# Patient Record
Sex: FEMALE | Race: WHITE | NOT HISPANIC OR LATINO | Employment: PART TIME | URBAN - METROPOLITAN AREA
[De-identification: names, ages, dates, MRNs, and addresses within clinical notes are randomized per-mention and may not be internally consistent; named-entity substitution may affect disease eponyms.]

---

## 2019-01-20 ENCOUNTER — HOSPITAL ENCOUNTER (EMERGENCY)
Facility: HOSPITAL | Age: 59
Discharge: HOME | End: 2019-01-20
Attending: EMERGENCY MEDICINE | Admitting: EMERGENCY MEDICINE

## 2019-01-20 ENCOUNTER — APPOINTMENT (OUTPATIENT)
Dept: CT IMAGING | Facility: HOSPITAL | Age: 59
End: 2019-01-20

## 2019-01-20 VITALS
RESPIRATION RATE: 20 BRPM | HEIGHT: 67 IN | HEART RATE: 80 BPM | DIASTOLIC BLOOD PRESSURE: 70 MMHG | TEMPERATURE: 98.5 F | WEIGHT: 98 LBS | SYSTOLIC BLOOD PRESSURE: 110 MMHG | BODY MASS INDEX: 15.38 KG/M2 | OXYGEN SATURATION: 94 %

## 2019-01-20 DIAGNOSIS — E86.0 DEHYDRATION: ICD-10-CM

## 2019-01-20 DIAGNOSIS — J11.1 INFLUENZA: ICD-10-CM

## 2019-01-20 DIAGNOSIS — R10.9 ABDOMINAL PAIN, UNSPECIFIED ABDOMINAL LOCATION: Primary | ICD-10-CM

## 2019-01-20 LAB
ALBUMIN SERPL-MCNC: 3.71 G/DL (ref 3.2–4.8)
ALBUMIN/GLOB SERPL: 1.8 G/DL (ref 1.5–2.5)
ALP SERPL-CCNC: 110 U/L (ref 25–100)
ALT SERPL W P-5'-P-CCNC: 11 U/L (ref 7–40)
ANION GAP SERPL CALCULATED.3IONS-SCNC: 4 MMOL/L (ref 3–11)
AST SERPL-CCNC: 18 U/L (ref 0–33)
BASOPHILS # BLD AUTO: 0.02 10*3/MM3 (ref 0–0.2)
BASOPHILS NFR BLD AUTO: 0.2 % (ref 0–1)
BILIRUB SERPL-MCNC: 0.2 MG/DL (ref 0.3–1.2)
BILIRUB UR QL STRIP: NEGATIVE
BUN BLD-MCNC: 12 MG/DL (ref 9–23)
BUN/CREAT SERPL: 23.5 (ref 7–25)
CALCIUM SPEC-SCNC: 8.3 MG/DL (ref 8.7–10.4)
CHLORIDE SERPL-SCNC: 111 MMOL/L (ref 99–109)
CLARITY UR: ABNORMAL
CO2 SERPL-SCNC: 27 MMOL/L (ref 20–31)
COLOR UR: ABNORMAL
CREAT BLD-MCNC: 0.51 MG/DL (ref 0.6–1.3)
DEPRECATED RDW RBC AUTO: 48.8 FL (ref 37–54)
EOSINOPHIL # BLD AUTO: 0.13 10*3/MM3 (ref 0–0.3)
EOSINOPHIL NFR BLD AUTO: 1.5 % (ref 0–3)
ERYTHROCYTE [DISTWIDTH] IN BLOOD BY AUTOMATED COUNT: 14.1 % (ref 11.3–14.5)
GFR SERPL CREATININE-BSD FRML MDRD: 124 ML/MIN/1.73
GLOBULIN UR ELPH-MCNC: 2.1 GM/DL
GLUCOSE BLD-MCNC: 95 MG/DL (ref 70–100)
GLUCOSE UR STRIP-MCNC: NEGATIVE MG/DL
HCT VFR BLD AUTO: 40.3 % (ref 34.5–44)
HGB BLD-MCNC: 13.3 G/DL (ref 11.5–15.5)
HGB UR QL STRIP.AUTO: ABNORMAL
IMM GRANULOCYTES # BLD AUTO: 0.01 10*3/MM3 (ref 0–0.03)
IMM GRANULOCYTES NFR BLD AUTO: 0.1 % (ref 0–0.6)
KETONES UR QL STRIP: ABNORMAL
LEUKOCYTE ESTERASE UR QL STRIP.AUTO: ABNORMAL
LIPASE SERPL-CCNC: 23 U/L (ref 6–51)
LYMPHOCYTES # BLD AUTO: 1.77 10*3/MM3 (ref 0.6–4.8)
LYMPHOCYTES NFR BLD AUTO: 20.2 % (ref 24–44)
MCH RBC QN AUTO: 31.2 PG (ref 27–31)
MCHC RBC AUTO-ENTMCNC: 33 G/DL (ref 32–36)
MCV RBC AUTO: 94.6 FL (ref 80–99)
MONOCYTES # BLD AUTO: 0.83 10*3/MM3 (ref 0–1)
MONOCYTES NFR BLD AUTO: 9.5 % (ref 0–12)
NEUTROPHILS # BLD AUTO: 6.02 10*3/MM3 (ref 1.5–8.3)
NEUTROPHILS NFR BLD AUTO: 68.5 % (ref 41–71)
NITRITE UR QL STRIP: NEGATIVE
PH UR STRIP.AUTO: 6 [PH] (ref 5–8)
PLATELET # BLD AUTO: 183 10*3/MM3 (ref 150–450)
PMV BLD AUTO: 10.4 FL (ref 6–12)
POTASSIUM BLD-SCNC: 3.7 MMOL/L (ref 3.5–5.5)
PROT SERPL-MCNC: 5.8 G/DL (ref 5.7–8.2)
PROT UR QL STRIP: ABNORMAL
RBC # BLD AUTO: 4.26 10*6/MM3 (ref 3.89–5.14)
SODIUM BLD-SCNC: 142 MMOL/L (ref 132–146)
SP GR UR STRIP: 1.02 (ref 1–1.03)
UROBILINOGEN UR QL STRIP: ABNORMAL
WBC NRBC COR # BLD: 8.78 10*3/MM3 (ref 3.5–10.8)

## 2019-01-20 PROCEDURE — 80053 COMPREHEN METABOLIC PANEL: CPT | Performed by: EMERGENCY MEDICINE

## 2019-01-20 PROCEDURE — 99284 EMERGENCY DEPT VISIT MOD MDM: CPT

## 2019-01-20 PROCEDURE — 96374 THER/PROPH/DIAG INJ IV PUSH: CPT

## 2019-01-20 PROCEDURE — 25010000002 ONDANSETRON PER 1 MG: Performed by: EMERGENCY MEDICINE

## 2019-01-20 PROCEDURE — 83690 ASSAY OF LIPASE: CPT | Performed by: EMERGENCY MEDICINE

## 2019-01-20 PROCEDURE — 85025 COMPLETE CBC W/AUTO DIFF WBC: CPT | Performed by: EMERGENCY MEDICINE

## 2019-01-20 PROCEDURE — 96375 TX/PRO/DX INJ NEW DRUG ADDON: CPT

## 2019-01-20 PROCEDURE — 96361 HYDRATE IV INFUSION ADD-ON: CPT

## 2019-01-20 PROCEDURE — 87086 URINE CULTURE/COLONY COUNT: CPT | Performed by: EMERGENCY MEDICINE

## 2019-01-20 PROCEDURE — 81001 URINALYSIS AUTO W/SCOPE: CPT | Performed by: EMERGENCY MEDICINE

## 2019-01-20 PROCEDURE — 74176 CT ABD & PELVIS W/O CONTRAST: CPT

## 2019-01-20 PROCEDURE — 25010000002 KETOROLAC TROMETHAMINE PER 15 MG: Performed by: EMERGENCY MEDICINE

## 2019-01-20 PROCEDURE — 93005 ELECTROCARDIOGRAM TRACING: CPT | Performed by: EMERGENCY MEDICINE

## 2019-01-20 RX ORDER — ONDANSETRON 4 MG/1
4 TABLET, ORALLY DISINTEGRATING ORAL EVERY 8 HOURS PRN
Qty: 10 TABLET | Refills: 0 | Status: SHIPPED | OUTPATIENT
Start: 2019-01-20

## 2019-01-20 RX ORDER — KETOROLAC TROMETHAMINE 15 MG/ML
15 INJECTION, SOLUTION INTRAMUSCULAR; INTRAVENOUS ONCE
Status: COMPLETED | OUTPATIENT
Start: 2019-01-20 | End: 2019-01-20

## 2019-01-20 RX ORDER — OSELTAMIVIR PHOSPHATE 75 MG/1
75 CAPSULE ORAL EVERY 12 HOURS
Qty: 10 CAPSULE | Refills: 0 | Status: SHIPPED | OUTPATIENT
Start: 2019-01-20 | End: 2019-01-25

## 2019-01-20 RX ORDER — OXYCODONE AND ACETAMINOPHEN 10; 325 MG/1; MG/1
1 TABLET ORAL EVERY 6 HOURS PRN
COMMUNITY

## 2019-01-20 RX ORDER — PROMETHAZINE HYDROCHLORIDE 25 MG/1
25 TABLET ORAL EVERY 6 HOURS PRN
COMMUNITY
End: 2022-02-07 | Stop reason: HOSPADM

## 2019-01-20 RX ORDER — OSELTAMIVIR PHOSPHATE 75 MG/1
75 CAPSULE ORAL ONCE
Status: COMPLETED | OUTPATIENT
Start: 2019-01-20 | End: 2019-01-20

## 2019-01-20 RX ORDER — ONDANSETRON 2 MG/ML
4 INJECTION INTRAMUSCULAR; INTRAVENOUS ONCE
Status: COMPLETED | OUTPATIENT
Start: 2019-01-20 | End: 2019-01-20

## 2019-01-20 RX ADMIN — OSELTAMIVIR PHOSPHATE 75 MG: 75 CAPSULE ORAL at 22:55

## 2019-01-20 RX ADMIN — ONDANSETRON 4 MG: 2 INJECTION INTRAMUSCULAR; INTRAVENOUS at 20:42

## 2019-01-20 RX ADMIN — SODIUM CHLORIDE 1000 ML: 9 INJECTION, SOLUTION INTRAVENOUS at 20:42

## 2019-01-20 RX ADMIN — KETOROLAC TROMETHAMINE 15 MG: 15 INJECTION, SOLUTION INTRAMUSCULAR; INTRAVENOUS at 21:34

## 2019-01-20 NOTE — ED PROVIDER NOTES
Subjective   Mary West is a 58 y.o.female who presents to the ED with complains of RUQ abdominal pain. The patient reports her pain has been constant since it onset yesterday. She states her pain radiates into her back. She has not taken any medication for her discomfort. She also complains of nausea and a cough, but denies any fever or diarrhea. Additionally, she has a history of pancreatitis and COPD. Moreover, she has a former history of alcohol abuse. There are no other complaints at this time.         History provided by:  Patient  Abdominal Pain   Pain location:  RUQ  Pain quality: aching    Pain radiates to:  Back  Pain severity:  Moderate  Onset quality:  Sudden  Duration:  2 days  Timing:  Constant  Progression:  Unchanged  Chronicity:  New  Relieved by:  None tried  Worsened by:  Nothing  Ineffective treatments:  None tried  Associated symptoms: cough and nausea    Associated symptoms: no diarrhea and no fever    Risk factors: alcohol abuse (former)        Review of Systems   Constitutional: Negative for fever.   Respiratory: Positive for cough.    Gastrointestinal: Positive for abdominal pain and nausea. Negative for diarrhea.   Musculoskeletal: Positive for back pain.   All other systems reviewed and are negative.      No past medical history on file.    Allergies   Allergen Reactions   • Methadone Swelling       No past surgical history on file.    No family history on file.    Social History     Socioeconomic History   • Marital status:      Spouse name: Not on file   • Number of children: Not on file   • Years of education: Not on file   • Highest education level: Not on file         Objective   Physical Exam   Constitutional: She is oriented to person, place, and time. She appears well-developed. No distress.   Muscle wasting.    HENT:   Head: Normocephalic and atraumatic.   Nose: Nose normal.   Eyes: Conjunctivae are normal. No scleral icterus.   Neck: Normal range of motion. Neck supple.    Cardiovascular: Normal rate, regular rhythm, normal heart sounds and intact distal pulses.   No murmur heard.  Pulmonary/Chest: Effort normal. No respiratory distress. She has wheezes.   Occasional wheeze to the upper air fields.    Abdominal: Soft. Bowel sounds are normal. There is tenderness in the right upper quadrant and epigastric area. There is guarding. There is no rebound.   Epigastric and RUQ tenderness with guarding.    Musculoskeletal: Normal range of motion. She exhibits no edema.   Neurological: She is alert and oriented to person, place, and time.   Skin: Skin is warm and dry.   Psychiatric: She has a normal mood and affect. Her behavior is normal.   Nursing note and vitals reviewed.      Procedures         ED Course  ED Course as of Jan 20 2249   Sun Jan 20, 2019 2243 Patient states that she has had cough, congestion, and body aches.  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  Given this and the lack of evidence otherwise, we will treat her for her likely influenza.  I discussed the plan with the patient and her family and they agreed.  [JI]      ED Course User Index  [JI] Dino Zhao PA     Recent Results (from the past 24 hour(s))   Comprehensive Metabolic Panel    Collection Time: 01/20/19  8:25 PM   Result Value Ref Range    Glucose 95 70 - 100 mg/dL    BUN 12 9 - 23 mg/dL    Creatinine 0.51 (L) 0.60 - 1.30 mg/dL    Sodium 142 132 - 146 mmol/L    Potassium 3.7 3.5 - 5.5 mmol/L    Chloride 111 (H) 99 - 109 mmol/L    CO2 27.0 20.0 - 31.0 mmol/L    Calcium 8.3 (L) 8.7 - 10.4 mg/dL    Total Protein 5.8 5.7 - 8.2 g/dL    Albumin 3.71 3.20 - 4.80 g/dL    ALT (SGPT) 11 7 - 40 U/L    AST (SGOT) 18 0 - 33 U/L    Alkaline Phosphatase 110 (H) 25 - 100 U/L    Total Bilirubin 0.2 (L) 0.3 - 1.2 mg/dL    eGFR Non African Amer 124 >60 mL/min/1.73    Globulin 2.1 gm/dL    A/G Ratio 1.8 1.5 - 2.5 g/dL    BUN/Creatinine Ratio 23.5 7.0 - 25.0    Anion Gap 4.0 3.0 - 11.0 mmol/L   Lipase     Collection Time: 01/20/19  8:25 PM   Result Value Ref Range    Lipase 23 6 - 51 U/L   CBC Auto Differential    Collection Time: 01/20/19  8:25 PM   Result Value Ref Range    WBC 8.78 3.50 - 10.80 10*3/mm3    RBC 4.26 3.89 - 5.14 10*6/mm3    Hemoglobin 13.3 11.5 - 15.5 g/dL    Hematocrit 40.3 34.5 - 44.0 %    MCV 94.6 80.0 - 99.0 fL    MCH 31.2 (H) 27.0 - 31.0 pg    MCHC 33.0 32.0 - 36.0 g/dL    RDW 14.1 11.3 - 14.5 %    RDW-SD 48.8 37.0 - 54.0 fl    MPV 10.4 6.0 - 12.0 fL    Platelets 183 150 - 450 10*3/mm3    Neutrophil % 68.5 41.0 - 71.0 %    Lymphocyte % 20.2 (L) 24.0 - 44.0 %    Monocyte % 9.5 0.0 - 12.0 %    Eosinophil % 1.5 0.0 - 3.0 %    Basophil % 0.2 0.0 - 1.0 %    Immature Grans % 0.1 0.0 - 0.6 %    Neutrophils, Absolute 6.02 1.50 - 8.30 10*3/mm3    Lymphocytes, Absolute 1.77 0.60 - 4.80 10*3/mm3    Monocytes, Absolute 0.83 0.00 - 1.00 10*3/mm3    Eosinophils, Absolute 0.13 0.00 - 0.30 10*3/mm3    Basophils, Absolute 0.02 0.00 - 0.20 10*3/mm3    Immature Grans, Absolute 0.01 0.00 - 0.03 10*3/mm3   Urinalysis without microscopic (no culture) - Urine, Clean Catch    Collection Time: 01/20/19  9:44 PM   Result Value Ref Range    Color, UA Dark Yellow (A) Yellow, Straw    Appearance, UA Cloudy (A) Clear    pH, UA 6.0 5.0 - 8.0    Specific Gravity, UA 1.019 1.001 - 1.030    Glucose, UA Negative Negative    Ketones, UA Trace (A) Negative    Bilirubin, UA Negative Negative    Blood, UA Large (3+) (A) Negative    Protein, UA 30 mg/dL (1+) (A) Negative    Leuk Esterase, UA Small (1+) (A) Negative    Nitrite, UA Negative Negative    Urobilinogen, UA 1.0 E.U./dL 0.2 - 1.0 E.U./dL     Note: In addition to lab results from this visit, the labs listed above may include labs taken at another facility or during a different encounter within the last 24 hours. Please correlate lab times with ED admission and discharge times for further clarification of the services performed during this visit.    CT Abdomen Pelvis  Without Contrast   Final Result   No acute abdominal or pelvic abnormality.      THIS DOCUMENT HAS BEEN ELECTRONICALLY SIGNED BY LUIS MOMIN MD        Vitals:    01/20/19 1830 01/20/19 1831 01/20/19 2044 01/20/19 2100   BP: 108/60  99/58 107/65   BP Location:       Patient Position:       Pulse:       Resp:       Temp:       TempSrc:       SpO2: 95% 91% 91% 94%   Weight:       Height:         Medications   oseltamivir (TAMIFLU) capsule 75 mg (not administered)   sodium chloride 0.9 % bolus 1,000 mL (1,000 mL Intravenous New Bag 1/20/19 2042)   ondansetron (ZOFRAN) injection 4 mg (4 mg Intravenous Given 1/20/19 2042)   ketorolac (TORADOL) injection 15 mg (15 mg Intravenous Given 1/20/19 2134)     ECG/EMG Results (last 24 hours)     ** No results found for the last 24 hours. **        ECG 12 Lead   Final Result   Test Reason : abd pain   Blood Pressure : **/** mmHG   Vent. Rate : 066 BPM     Atrial Rate : 066 BPM      P-R Int : 116 ms          QRS Dur : 080 ms       QT Int : 404 ms       P-R-T Axes : 075 079 071 degrees      QTc Int : 423 ms      Normal sinus rhythm   Possible Left atrial enlargement   Borderline ECG   When compared with ECG of 28-APR-2016 19:36,   Nonspecific T wave abnormality now evident in Lateral leads   Confirmed by MAO CRAWFORD MD ([id number]) on 1/20/2019 9:29:30 PM      Referred By:  TY           Confirmed By:MAO CRAWFORD MD                    Recent Results (from the past 24 hour(s))   Comprehensive Metabolic Panel    Collection Time: 01/20/19  8:25 PM   Result Value Ref Range    Glucose 95 70 - 100 mg/dL    BUN 12 9 - 23 mg/dL    Creatinine 0.51 (L) 0.60 - 1.30 mg/dL    Sodium 142 132 - 146 mmol/L    Potassium 3.7 3.5 - 5.5 mmol/L    Chloride 111 (H) 99 - 109 mmol/L    CO2 27.0 20.0 - 31.0 mmol/L    Calcium 8.3 (L) 8.7 - 10.4 mg/dL    Total Protein 5.8 5.7 - 8.2 g/dL    Albumin 3.71 3.20 - 4.80 g/dL    ALT (SGPT) 11 7 - 40 U/L    AST (SGOT) 18 0 - 33 U/L    Alkaline Phosphatase 110 (H) 25  - 100 U/L    Total Bilirubin 0.2 (L) 0.3 - 1.2 mg/dL    eGFR Non African Amer 124 >60 mL/min/1.73    Globulin 2.1 gm/dL    A/G Ratio 1.8 1.5 - 2.5 g/dL    BUN/Creatinine Ratio 23.5 7.0 - 25.0    Anion Gap 4.0 3.0 - 11.0 mmol/L   Lipase    Collection Time: 01/20/19  8:25 PM   Result Value Ref Range    Lipase 23 6 - 51 U/L   CBC Auto Differential    Collection Time: 01/20/19  8:25 PM   Result Value Ref Range    WBC 8.78 3.50 - 10.80 10*3/mm3    RBC 4.26 3.89 - 5.14 10*6/mm3    Hemoglobin 13.3 11.5 - 15.5 g/dL    Hematocrit 40.3 34.5 - 44.0 %    MCV 94.6 80.0 - 99.0 fL    MCH 31.2 (H) 27.0 - 31.0 pg    MCHC 33.0 32.0 - 36.0 g/dL    RDW 14.1 11.3 - 14.5 %    RDW-SD 48.8 37.0 - 54.0 fl    MPV 10.4 6.0 - 12.0 fL    Platelets 183 150 - 450 10*3/mm3    Neutrophil % 68.5 41.0 - 71.0 %    Lymphocyte % 20.2 (L) 24.0 - 44.0 %    Monocyte % 9.5 0.0 - 12.0 %    Eosinophil % 1.5 0.0 - 3.0 %    Basophil % 0.2 0.0 - 1.0 %    Immature Grans % 0.1 0.0 - 0.6 %    Neutrophils, Absolute 6.02 1.50 - 8.30 10*3/mm3    Lymphocytes, Absolute 1.77 0.60 - 4.80 10*3/mm3    Monocytes, Absolute 0.83 0.00 - 1.00 10*3/mm3    Eosinophils, Absolute 0.13 0.00 - 0.30 10*3/mm3    Basophils, Absolute 0.02 0.00 - 0.20 10*3/mm3    Immature Grans, Absolute 0.01 0.00 - 0.03 10*3/mm3   Urinalysis without microscopic (no culture) - Urine, Clean Catch    Collection Time: 01/20/19  9:44 PM   Result Value Ref Range    Color, UA Dark Yellow (A) Yellow, Straw    Appearance, UA Cloudy (A) Clear    pH, UA 6.0 5.0 - 8.0    Specific Gravity, UA 1.019 1.001 - 1.030    Glucose, UA Negative Negative    Ketones, UA Trace (A) Negative    Bilirubin, UA Negative Negative    Blood, UA Large (3+) (A) Negative    Protein, UA 30 mg/dL (1+) (A) Negative    Leuk Esterase, UA Small (1+) (A) Negative    Nitrite, UA Negative Negative    Urobilinogen, UA 1.0 E.U./dL 0.2 - 1.0 E.U./dL     Note: In addition to lab results from this visit, the labs listed above may include labs taken at  another facility or during a different encounter within the last 24 hours. Please correlate lab times with ED admission and discharge times for further clarification of the services performed during this visit.    CT Abdomen Pelvis Without Contrast   Final Result   No acute abdominal or pelvic abnormality.      THIS DOCUMENT HAS BEEN ELECTRONICALLY SIGNED BY LUIS MOMIN MD        Vitals:    01/20/19 1830 01/20/19 1831 01/20/19 2044 01/20/19 2100   BP: 108/60  99/58 107/65   BP Location:       Patient Position:       Pulse:       Resp:       Temp:       TempSrc:       SpO2: 95% 91% 91% 94%   Weight:       Height:         Medications   oseltamivir (TAMIFLU) capsule 75 mg (not administered)   sodium chloride 0.9 % bolus 1,000 mL (1,000 mL Intravenous New Bag 1/20/19 2042)   ondansetron (ZOFRAN) injection 4 mg (4 mg Intravenous Given 1/20/19 2042)   ketorolac (TORADOL) injection 15 mg (15 mg Intravenous Given 1/20/19 2134)     ECG/EMG Results (last 24 hours)     Procedure Component Value Units Date/Time    ECG 12 Lead [[id number]] Collected:  01/20/19 1856     Updated:  01/20/19 2129    Narrative:       Test Reason : abd pain  Blood Pressure : **/** mmHG  Vent. Rate : 066 BPM     Atrial Rate : 066 BPM     P-R Int : 116 ms          QRS Dur : 080 ms      QT Int : 404 ms       P-R-T Axes : 075 079 071 degrees     QTc Int : 423 ms    Normal sinus rhythm  Possible Left atrial enlargement  Borderline ECG  When compared with ECG of 28-APR-2016 19:36,  Nonspecific T wave abnormality now evident in Lateral leads  Confirmed by MAO CRAWFORD MD ([id number]) on 1/20/2019 9:29:30 PM    Referred By:  TY           Confirmed By:MAO CRAWFORD MD        ECG 12 Lead   Final Result   Test Reason : abd pain   Blood Pressure : **/** mmHG   Vent. Rate : 066 BPM     Atrial Rate : 066 BPM      P-R Int : 116 ms          QRS Dur : 080 ms       QT Int : 404 ms       P-R-T Axes : 075 079 071 degrees      QTc Int : 423 ms      Normal sinus rhythm    Possible Left atrial enlargement   Borderline ECG   When compared with ECG of 28-APR-2016 19:36,   Nonspecific T wave abnormality now evident in Lateral leads   Confirmed by MAO CRAWFORD MD ([id number]) on 1/20/2019 9:29:30 PM      Referred By:  TY           Confirmed By:MAO CRAWFORD MD                [facility]    Final diagnoses:   Abdominal pain, unspecified abdominal location   Dehydration   Influenza       Documentation assistance provided by yumiko Blount.  Information recorded by the scribe was done at my direction and has been verified and validated by me.     Fermín Blount  01/20/19 3855       Dino Zhao PA  01/20/19 5222

## 2019-01-21 LAB
BACTERIA UR QL AUTO: ABNORMAL /HPF
COD CRY URNS QL: ABNORMAL /HPF
HYALINE CASTS UR QL AUTO: ABNORMAL /LPF
RBC # UR: ABNORMAL /HPF
REF LAB TEST METHOD: ABNORMAL
SQUAMOUS #/AREA URNS HPF: ABNORMAL /HPF
WBC UR QL AUTO: ABNORMAL /HPF

## 2019-01-23 LAB — BACTERIA SPEC AEROBE CULT: NORMAL

## 2022-01-25 PROCEDURE — U0004 COV-19 TEST NON-CDC HGH THRU: HCPCS | Performed by: PHYSICIAN ASSISTANT

## 2022-01-30 ENCOUNTER — APPOINTMENT (OUTPATIENT)
Dept: CT IMAGING | Facility: HOSPITAL | Age: 62
End: 2022-01-30

## 2022-01-30 ENCOUNTER — APPOINTMENT (OUTPATIENT)
Dept: GENERAL RADIOLOGY | Facility: HOSPITAL | Age: 62
End: 2022-01-30

## 2022-01-30 ENCOUNTER — HOSPITAL ENCOUNTER (INPATIENT)
Facility: HOSPITAL | Age: 62
LOS: 8 days | Discharge: HOME | End: 2022-02-07
Attending: EMERGENCY MEDICINE | Admitting: INTERNAL MEDICINE

## 2022-01-30 ENCOUNTER — APPOINTMENT (OUTPATIENT)
Dept: CARDIOLOGY | Facility: HOSPITAL | Age: 62
End: 2022-01-30

## 2022-01-30 DIAGNOSIS — U07.1 ACUTE RESPIRATORY FAILURE DUE TO COVID-19: Primary | ICD-10-CM

## 2022-01-30 DIAGNOSIS — J96.00 ACUTE RESPIRATORY FAILURE DUE TO COVID-19: Primary | ICD-10-CM

## 2022-01-30 DIAGNOSIS — J18.9 PNEUMONIA OF RIGHT MIDDLE LOBE DUE TO INFECTIOUS ORGANISM: ICD-10-CM

## 2022-01-30 DIAGNOSIS — R13.10 DYSPHAGIA, UNSPECIFIED TYPE: ICD-10-CM

## 2022-01-30 DIAGNOSIS — Z87.09 HISTORY OF COPD: ICD-10-CM

## 2022-01-30 PROBLEM — F17.200 TOBACCO DEPENDENCE: Chronic | Status: ACTIVE | Noted: 2022-01-30

## 2022-01-30 LAB
ALBUMIN SERPL-MCNC: 3.5 G/DL (ref 3.5–5.2)
ALBUMIN/GLOB SERPL: 1.6 G/DL
ALP SERPL-CCNC: 98 U/L (ref 39–117)
ALT SERPL W P-5'-P-CCNC: 36 U/L (ref 1–33)
ANION GAP SERPL CALCULATED.3IONS-SCNC: 10 MMOL/L (ref 5–15)
ARTERIAL PATENCY WRIST A: ABNORMAL
AST SERPL-CCNC: 67 U/L (ref 1–32)
ATMOSPHERIC PRESS: ABNORMAL MM[HG]
BASE EXCESS BLDA CALC-SCNC: 4.9 MMOL/L (ref 0–2)
BASOPHILS # BLD AUTO: 0.01 10*3/MM3 (ref 0–0.2)
BASOPHILS NFR BLD AUTO: 0.2 % (ref 0–1.5)
BDY SITE: ABNORMAL
BH CV ECHO MEAS - AO MAX PG (FULL): 0.54 MMHG
BH CV ECHO MEAS - AO MAX PG: 4 MMHG
BH CV ECHO MEAS - AO MEAN PG (FULL): 0.69 MMHG
BH CV ECHO MEAS - AO MEAN PG: 2.1 MMHG
BH CV ECHO MEAS - AO ROOT AREA (BSA CORRECTED): 2.3
BH CV ECHO MEAS - AO ROOT AREA: 8.9 CM^2
BH CV ECHO MEAS - AO ROOT DIAM: 3.4 CM
BH CV ECHO MEAS - AO V2 MAX: 101.7 CM/SEC
BH CV ECHO MEAS - AO V2 MEAN: 64.3 CM/SEC
BH CV ECHO MEAS - AO V2 VTI: 17.2 CM
BH CV ECHO MEAS - AVA(I,A): 4.3 CM^2
BH CV ECHO MEAS - AVA(I,D): 4.3 CM^2
BH CV ECHO MEAS - AVA(V,A): 4.1 CM^2
BH CV ECHO MEAS - AVA(V,D): 4.1 CM^2
BH CV ECHO MEAS - BSA(HAYCOCK): 1.4 M^2
BH CV ECHO MEAS - BSA: 1.5 M^2
BH CV ECHO MEAS - BZI_BMI: 15.2 KILOGRAMS/M^2
BH CV ECHO MEAS - BZI_METRIC_HEIGHT: 170.2 CM
BH CV ECHO MEAS - BZI_METRIC_WEIGHT: 44 KG
BH CV ECHO MEAS - EDV(CUBED): 39 ML
BH CV ECHO MEAS - EDV(MOD-SP2): 43.8 ML
BH CV ECHO MEAS - EDV(MOD-SP4): 48.5 ML
BH CV ECHO MEAS - EDV(TEICH): 47.2 ML
BH CV ECHO MEAS - EF(CUBED): 76.6 %
BH CV ECHO MEAS - EF(MOD-BP): 50.2 %
BH CV ECHO MEAS - EF(MOD-SP2): 46.1 %
BH CV ECHO MEAS - EF(MOD-SP4): 50.9 %
BH CV ECHO MEAS - EF(TEICH): 69.8 %
BH CV ECHO MEAS - ESV(CUBED): 9.1 ML
BH CV ECHO MEAS - ESV(MOD-SP2): 23.6 ML
BH CV ECHO MEAS - ESV(MOD-SP4): 23.8 ML
BH CV ECHO MEAS - ESV(TEICH): 14.2 ML
BH CV ECHO MEAS - FS: 38.4 %
BH CV ECHO MEAS - IVS/LVPW: 0.99
BH CV ECHO MEAS - IVSD: 0.89 CM
BH CV ECHO MEAS - LA DIMENSION: 2.7 CM
BH CV ECHO MEAS - LA/AO: 0.79
BH CV ECHO MEAS - LAD MAJOR: 3.4 CM
BH CV ECHO MEAS - LV DIASTOLIC VOL/BSA (35-75): 32.6 ML/M^2
BH CV ECHO MEAS - LV MASS(C)D: 84.5 GRAMS
BH CV ECHO MEAS - LV MASS(C)DI: 56.8 GRAMS/M^2
BH CV ECHO MEAS - LV MAX PG: 3.5 MMHG
BH CV ECHO MEAS - LV MEAN PG: 1.4 MMHG
BH CV ECHO MEAS - LV SYSTOLIC VOL/BSA (12-30): 16 ML/M^2
BH CV ECHO MEAS - LV V1 MAX: 93 CM/SEC
BH CV ECHO MEAS - LV V1 MEAN: 51.7 CM/SEC
BH CV ECHO MEAS - LV V1 VTI: 16.8 CM
BH CV ECHO MEAS - LVIDD: 3.4 CM
BH CV ECHO MEAS - LVIDS: 2.1 CM
BH CV ECHO MEAS - LVLD AP2: 6.2 CM
BH CV ECHO MEAS - LVLD AP4: 5.9 CM
BH CV ECHO MEAS - LVLS AP2: 5.3 CM
BH CV ECHO MEAS - LVLS AP4: 5.3 CM
BH CV ECHO MEAS - LVOT AREA (M): 4.5 CM^2
BH CV ECHO MEAS - LVOT AREA: 4.4 CM^2
BH CV ECHO MEAS - LVOT DIAM: 2.4 CM
BH CV ECHO MEAS - LVPWD: 0.91 CM
BH CV ECHO MEAS - MED PEAK E' VEL: 7.4 CM/SEC
BH CV ECHO MEAS - MEDIAL E/E' RATIO: 6.7
BH CV ECHO MEAS - MV A MAX VEL: 51.8 CM/SEC
BH CV ECHO MEAS - MV DEC SLOPE: 126.4 CM/SEC^2
BH CV ECHO MEAS - MV DEC TIME: 0.27 SEC
BH CV ECHO MEAS - MV E MAX VEL: 49.4 CM/SEC
BH CV ECHO MEAS - MV E/A: 0.95
BH CV ECHO MEAS - MV P1/2T MAX VEL: 53.9 CM/SEC
BH CV ECHO MEAS - MV P1/2T: 124.8 MSEC
BH CV ECHO MEAS - MVA P1/2T LCG: 4.1 CM^2
BH CV ECHO MEAS - MVA(P1/2T): 1.8 CM^2
BH CV ECHO MEAS - PA ACC TIME: 0.14 SEC
BH CV ECHO MEAS - PA MAX PG: 2.9 MMHG
BH CV ECHO MEAS - PA PR(ACCEL): 14 MMHG
BH CV ECHO MEAS - PA V2 MAX: 85.1 CM/SEC
BH CV ECHO MEAS - RAP SYSTOLE: 3 MMHG
BH CV ECHO MEAS - RVSP: 29 MMHG
BH CV ECHO MEAS - SI(AO): 103.3 ML/M^2
BH CV ECHO MEAS - SI(CUBED): 20.1 ML/M^2
BH CV ECHO MEAS - SI(LVOT): 50.1 ML/M^2
BH CV ECHO MEAS - SI(MOD-SP2): 13.6 ML/M^2
BH CV ECHO MEAS - SI(MOD-SP4): 16.6 ML/M^2
BH CV ECHO MEAS - SI(TEICH): 22.2 ML/M^2
BH CV ECHO MEAS - SV(AO): 153.6 ML
BH CV ECHO MEAS - SV(CUBED): 29.9 ML
BH CV ECHO MEAS - SV(LVOT): 74.4 ML
BH CV ECHO MEAS - SV(MOD-SP2): 20.2 ML
BH CV ECHO MEAS - SV(MOD-SP4): 24.7 ML
BH CV ECHO MEAS - SV(TEICH): 32.9 ML
BH CV ECHO MEAS - TR MAX PG: 26 MMHG
BH CV ECHO MEAS - TR MAX VEL: 198.2 CM/SEC
BH CV XLRA - RV BASE: 3.2 CM
BH CV XLRA - RV LENGTH: 5.4 CM
BH CV XLRA - RV MID: 2.5 CM
BH CV XLRA - TDI S': 10.1 CM/SEC
BILIRUB SERPL-MCNC: 0.3 MG/DL (ref 0–1.2)
BODY TEMPERATURE: 37 C
BUN SERPL-MCNC: 20 MG/DL (ref 8–23)
BUN/CREAT SERPL: 30.8 (ref 7–25)
CALCIUM SPEC-SCNC: 8.2 MG/DL (ref 8.6–10.5)
CHLORIDE SERPL-SCNC: 102 MMOL/L (ref 98–107)
CK SERPL-CCNC: 104 U/L (ref 20–180)
CO2 BLDA-SCNC: 31.1 MMOL/L (ref 22–33)
CO2 SERPL-SCNC: 26 MMOL/L (ref 22–29)
COHGB MFR BLD: 2.5 % (ref 0–2)
CREAT SERPL-MCNC: 0.58 MG/DL (ref 0.57–1)
CREAT SERPL-MCNC: 0.65 MG/DL (ref 0.57–1)
CRP SERPL-MCNC: 3.23 MG/DL (ref 0–0.5)
D DIMER PPP FEU-MCNC: 2.55 MCGFEU/ML (ref 0–0.56)
D DIMER PPP FEU-MCNC: 2.84 MCGFEU/ML (ref 0–0.56)
D-LACTATE SERPL-SCNC: 1.2 MMOL/L (ref 0.5–2)
D-LACTATE SERPL-SCNC: 2.6 MMOL/L (ref 0.5–2)
DEPRECATED RDW RBC AUTO: 51.1 FL (ref 37–54)
EOSINOPHIL # BLD AUTO: 0 10*3/MM3 (ref 0–0.4)
EOSINOPHIL NFR BLD AUTO: 0 % (ref 0.3–6.2)
EPAP: 0
ERYTHROCYTE [DISTWIDTH] IN BLOOD BY AUTOMATED COUNT: 14.4 % (ref 12.3–15.4)
FERRITIN SERPL-MCNC: 411.2 NG/ML (ref 13–150)
FERRITIN SERPL-MCNC: 527.8 NG/ML (ref 13–150)
GFR SERPL CREATININE-BSD FRML MDRD: 106 ML/MIN/1.73
GFR SERPL CREATININE-BSD FRML MDRD: 93 ML/MIN/1.73
GLOBULIN UR ELPH-MCNC: 2.2 GM/DL
GLUCOSE SERPL-MCNC: 114 MG/DL (ref 65–99)
HCO3 BLDA-SCNC: 29.7 MMOL/L (ref 20–26)
HCT VFR BLD AUTO: 43.1 % (ref 34–46.6)
HCT VFR BLD CALC: 40.7 % (ref 38–51)
HGB BLD-MCNC: 13.9 G/DL (ref 12–15.9)
HGB BLDA-MCNC: 13.3 G/DL (ref 14–18)
HOLD SPECIMEN: NORMAL
IMM GRANULOCYTES # BLD AUTO: 0.02 10*3/MM3 (ref 0–0.05)
IMM GRANULOCYTES NFR BLD AUTO: 0.4 % (ref 0–0.5)
INHALED O2 CONCENTRATION: 96 %
IPAP: 0
LDH SERPL-CCNC: 351 U/L (ref 135–214)
LDH SERPL-CCNC: 372 U/L (ref 135–214)
LIPASE SERPL-CCNC: 197 U/L (ref 13–60)
LV EF 2D ECHO EST: 50 %
LYMPHOCYTES # BLD AUTO: 0.85 10*3/MM3 (ref 0.7–3.1)
LYMPHOCYTES NFR BLD AUTO: 18.1 % (ref 19.6–45.3)
MAGNESIUM SERPL-MCNC: 1.7 MG/DL (ref 1.6–2.4)
MAXIMAL PREDICTED HEART RATE: 159 BPM
MCH RBC QN AUTO: 31.2 PG (ref 26.6–33)
MCHC RBC AUTO-ENTMCNC: 32.3 G/DL (ref 31.5–35.7)
MCV RBC AUTO: 96.6 FL (ref 79–97)
METHGB BLD QL: 0.2 % (ref 0–1.5)
MODALITY: ABNORMAL
MONOCYTES # BLD AUTO: 0.36 10*3/MM3 (ref 0.1–0.9)
MONOCYTES NFR BLD AUTO: 7.7 % (ref 5–12)
NEUTROPHILS NFR BLD AUTO: 3.45 10*3/MM3 (ref 1.7–7)
NEUTROPHILS NFR BLD AUTO: 73.6 % (ref 42.7–76)
NOTE: ABNORMAL
NRBC BLD AUTO-RTO: 0 /100 WBC (ref 0–0.2)
NT-PROBNP SERPL-MCNC: 4973 PG/ML (ref 0–900)
OXYHGB MFR BLDV: 97 % (ref 94–99)
PAW @ PEAK INSP FLOW SETTING VENT: 0 CMH2O
PCO2 BLDA: 43.7 MM HG (ref 35–45)
PCO2 TEMP ADJ BLD: 43.7 MM HG (ref 35–45)
PH BLDA: 7.44 PH UNITS (ref 7.35–7.45)
PH, TEMP CORRECTED: 7.44 PH UNITS
PLATELET # BLD AUTO: 131 10*3/MM3 (ref 140–450)
PMV BLD AUTO: 11.3 FL (ref 6–12)
PO2 BLDA: 150 MM HG (ref 83–108)
PO2 TEMP ADJ BLD: 150 MM HG (ref 83–108)
POTASSIUM SERPL-SCNC: 3.3 MMOL/L (ref 3.5–5.2)
POTASSIUM SERPL-SCNC: 3.9 MMOL/L (ref 3.5–5.2)
PROCALCITONIN SERPL-MCNC: 0.04 NG/ML (ref 0–0.25)
PROT SERPL-MCNC: 5.7 G/DL (ref 6–8.5)
RBC # BLD AUTO: 4.46 10*6/MM3 (ref 3.77–5.28)
SODIUM SERPL-SCNC: 138 MMOL/L (ref 136–145)
STRESS TARGET HR: 135 BPM
TOTAL RATE: 0 BREATHS/MINUTE
TROPONIN T SERPL-MCNC: 0.02 NG/ML (ref 0–0.03)
WBC NRBC COR # BLD: 4.69 10*3/MM3 (ref 3.4–10.8)
WHOLE BLOOD HOLD SPECIMEN: NORMAL
WHOLE BLOOD HOLD SPECIMEN: NORMAL

## 2022-01-30 PROCEDURE — 83615 LACTATE (LD) (LDH) ENZYME: CPT | Performed by: NURSE PRACTITIONER

## 2022-01-30 PROCEDURE — 80053 COMPREHEN METABOLIC PANEL: CPT | Performed by: EMERGENCY MEDICINE

## 2022-01-30 PROCEDURE — 71045 X-RAY EXAM CHEST 1 VIEW: CPT

## 2022-01-30 PROCEDURE — 83690 ASSAY OF LIPASE: CPT | Performed by: NURSE PRACTITIONER

## 2022-01-30 PROCEDURE — 83050 HGB METHEMOGLOBIN QUAN: CPT

## 2022-01-30 PROCEDURE — 71275 CT ANGIOGRAPHY CHEST: CPT

## 2022-01-30 PROCEDURE — 94640 AIRWAY INHALATION TREATMENT: CPT

## 2022-01-30 PROCEDURE — 93306 TTE W/DOPPLER COMPLETE: CPT

## 2022-01-30 PROCEDURE — 82805 BLOOD GASES W/O2 SATURATION: CPT

## 2022-01-30 PROCEDURE — 25010000002 AZITHROMYCIN PER 500 MG: Performed by: NURSE PRACTITIONER

## 2022-01-30 PROCEDURE — 99232 SBSQ HOSP IP/OBS MODERATE 35: CPT | Performed by: INTERNAL MEDICINE

## 2022-01-30 PROCEDURE — 83615 LACTATE (LD) (LDH) ENZYME: CPT | Performed by: EMERGENCY MEDICINE

## 2022-01-30 PROCEDURE — 25010000002 REMDESIVIR 100 MG/20ML SOLUTION 1 EACH VIAL: Performed by: NURSE PRACTITIONER

## 2022-01-30 PROCEDURE — 84145 PROCALCITONIN (PCT): CPT | Performed by: EMERGENCY MEDICINE

## 2022-01-30 PROCEDURE — 99284 EMERGENCY DEPT VISIT MOD MDM: CPT

## 2022-01-30 PROCEDURE — 82375 ASSAY CARBOXYHB QUANT: CPT

## 2022-01-30 PROCEDURE — 85379 FIBRIN DEGRADATION QUANT: CPT | Performed by: NURSE PRACTITIONER

## 2022-01-30 PROCEDURE — XW033E5 INTRODUCTION OF REMDESIVIR ANTI-INFECTIVE INTO PERIPHERAL VEIN, PERCUTANEOUS APPROACH, NEW TECHNOLOGY GROUP 5: ICD-10-PCS | Performed by: INTERNAL MEDICINE

## 2022-01-30 PROCEDURE — 86140 C-REACTIVE PROTEIN: CPT | Performed by: EMERGENCY MEDICINE

## 2022-01-30 PROCEDURE — 25010000002 CEFTRIAXONE PER 250 MG: Performed by: NURSE PRACTITIONER

## 2022-01-30 PROCEDURE — 99223 1ST HOSP IP/OBS HIGH 75: CPT | Performed by: INTERNAL MEDICINE

## 2022-01-30 PROCEDURE — 85025 COMPLETE CBC W/AUTO DIFF WBC: CPT | Performed by: EMERGENCY MEDICINE

## 2022-01-30 PROCEDURE — 84132 ASSAY OF SERUM POTASSIUM: CPT | Performed by: NURSE PRACTITIONER

## 2022-01-30 PROCEDURE — 25010000002 DEXAMETHASONE SODIUM PHOSPHATE 100 MG/10ML SOLUTION: Performed by: NURSE PRACTITIONER

## 2022-01-30 PROCEDURE — 82565 ASSAY OF CREATININE: CPT | Performed by: NURSE PRACTITIONER

## 2022-01-30 PROCEDURE — 87040 BLOOD CULTURE FOR BACTERIA: CPT | Performed by: NURSE PRACTITIONER

## 2022-01-30 PROCEDURE — 83605 ASSAY OF LACTIC ACID: CPT | Performed by: NURSE PRACTITIONER

## 2022-01-30 PROCEDURE — 51702 INSERT TEMP BLADDER CATH: CPT

## 2022-01-30 PROCEDURE — 83880 ASSAY OF NATRIURETIC PEPTIDE: CPT | Performed by: EMERGENCY MEDICINE

## 2022-01-30 PROCEDURE — 36600 WITHDRAWAL OF ARTERIAL BLOOD: CPT

## 2022-01-30 PROCEDURE — 84484 ASSAY OF TROPONIN QUANT: CPT | Performed by: EMERGENCY MEDICINE

## 2022-01-30 PROCEDURE — 36415 COLL VENOUS BLD VENIPUNCTURE: CPT

## 2022-01-30 PROCEDURE — 83735 ASSAY OF MAGNESIUM: CPT | Performed by: NURSE PRACTITIONER

## 2022-01-30 PROCEDURE — 82728 ASSAY OF FERRITIN: CPT | Performed by: EMERGENCY MEDICINE

## 2022-01-30 PROCEDURE — 93005 ELECTROCARDIOGRAM TRACING: CPT | Performed by: EMERGENCY MEDICINE

## 2022-01-30 PROCEDURE — 25010000002 ENOXAPARIN PER 10 MG: Performed by: NURSE PRACTITIONER

## 2022-01-30 PROCEDURE — 82550 ASSAY OF CK (CPK): CPT | Performed by: NURSE PRACTITIONER

## 2022-01-30 PROCEDURE — 0 MAGNESIUM SULFATE 4 GM/100ML SOLUTION: Performed by: NURSE PRACTITIONER

## 2022-01-30 PROCEDURE — 82728 ASSAY OF FERRITIN: CPT | Performed by: NURSE PRACTITIONER

## 2022-01-30 PROCEDURE — 0 IOPAMIDOL PER 1 ML: Performed by: EMERGENCY MEDICINE

## 2022-01-30 PROCEDURE — 25010000002 FUROSEMIDE PER 20 MG: Performed by: NURSE PRACTITIONER

## 2022-01-30 RX ORDER — POLYETHYLENE GLYCOL 3350 17 G/17G
17 POWDER, FOR SOLUTION ORAL DAILY PRN
Status: DISCONTINUED | OUTPATIENT
Start: 2022-01-30 | End: 2022-02-07 | Stop reason: HOSPADM

## 2022-01-30 RX ORDER — MULTIPLE VITAMINS W/ MINERALS TAB 9MG-400MCG
1 TAB ORAL DAILY
Status: DISCONTINUED | OUTPATIENT
Start: 2022-01-30 | End: 2022-02-07 | Stop reason: HOSPADM

## 2022-01-30 RX ORDER — ONDANSETRON 2 MG/ML
4 INJECTION INTRAMUSCULAR; INTRAVENOUS EVERY 6 HOURS PRN
Status: DISCONTINUED | OUTPATIENT
Start: 2022-01-30 | End: 2022-02-07 | Stop reason: HOSPADM

## 2022-01-30 RX ORDER — OXYCODONE HYDROCHLORIDE AND ACETAMINOPHEN 5; 325 MG/1; MG/1
1 TABLET ORAL EVERY 6 HOURS PRN
Status: DISCONTINUED | OUTPATIENT
Start: 2022-01-30 | End: 2022-02-07 | Stop reason: HOSPADM

## 2022-01-30 RX ORDER — BENZONATATE 100 MG/1
100 CAPSULE ORAL 3 TIMES DAILY PRN
Status: DISCONTINUED | OUTPATIENT
Start: 2022-01-30 | End: 2022-02-07 | Stop reason: HOSPADM

## 2022-01-30 RX ORDER — NOREPINEPHRINE BIT/0.9 % NACL 8 MG/250ML
.02-.3 INFUSION BOTTLE (ML) INTRAVENOUS
Status: DISCONTINUED | OUTPATIENT
Start: 2022-01-30 | End: 2022-02-02

## 2022-01-30 RX ORDER — BISACODYL 10 MG
10 SUPPOSITORY, RECTAL RECTAL DAILY PRN
Status: DISCONTINUED | OUTPATIENT
Start: 2022-01-30 | End: 2022-02-07 | Stop reason: HOSPADM

## 2022-01-30 RX ORDER — CALCIUM CARBONATE 200(500)MG
2 TABLET,CHEWABLE ORAL 2 TIMES DAILY PRN
Status: DISCONTINUED | OUTPATIENT
Start: 2022-01-30 | End: 2022-02-07 | Stop reason: HOSPADM

## 2022-01-30 RX ORDER — FUROSEMIDE 10 MG/ML
60 INJECTION INTRAMUSCULAR; INTRAVENOUS ONCE
Status: COMPLETED | OUTPATIENT
Start: 2022-01-30 | End: 2022-01-30

## 2022-01-30 RX ORDER — AMOXICILLIN 250 MG
2 CAPSULE ORAL 2 TIMES DAILY
Status: DISCONTINUED | OUTPATIENT
Start: 2022-01-30 | End: 2022-02-07 | Stop reason: HOSPADM

## 2022-01-30 RX ORDER — POTASSIUM CHLORIDE 750 MG/1
40 CAPSULE, EXTENDED RELEASE ORAL AS NEEDED
Status: DISCONTINUED | OUTPATIENT
Start: 2022-01-30 | End: 2022-02-07 | Stop reason: HOSPADM

## 2022-01-30 RX ORDER — SODIUM CHLORIDE 0.9 % (FLUSH) 0.9 %
10 SYRINGE (ML) INJECTION EVERY 12 HOURS SCHEDULED
Status: DISCONTINUED | OUTPATIENT
Start: 2022-01-30 | End: 2022-01-31

## 2022-01-30 RX ORDER — MAGNESIUM SULFATE HEPTAHYDRATE 40 MG/ML
4 INJECTION, SOLUTION INTRAVENOUS AS NEEDED
Status: DISCONTINUED | OUTPATIENT
Start: 2022-01-30 | End: 2022-02-07 | Stop reason: HOSPADM

## 2022-01-30 RX ORDER — HEPARIN SODIUM 5000 [USP'U]/ML
5000 INJECTION, SOLUTION INTRAVENOUS; SUBCUTANEOUS EVERY 8 HOURS SCHEDULED
Status: DISCONTINUED | OUTPATIENT
Start: 2022-01-30 | End: 2022-01-30

## 2022-01-30 RX ORDER — DEXAMETHASONE SODIUM PHOSPHATE 4 MG/ML
6 INJECTION, SOLUTION INTRA-ARTICULAR; INTRALESIONAL; INTRAMUSCULAR; INTRAVENOUS; SOFT TISSUE DAILY
Status: DISCONTINUED | OUTPATIENT
Start: 2022-01-31 | End: 2022-02-07 | Stop reason: HOSPADM

## 2022-01-30 RX ORDER — ALBUTEROL SULFATE 90 UG/1
2 AEROSOL, METERED RESPIRATORY (INHALATION) EVERY 6 HOURS PRN
Status: DISCONTINUED | OUTPATIENT
Start: 2022-01-30 | End: 2022-01-31

## 2022-01-30 RX ORDER — OXYCODONE AND ACETAMINOPHEN 10; 325 MG/1; MG/1
1 TABLET ORAL EVERY 6 HOURS PRN
Status: DISCONTINUED | OUTPATIENT
Start: 2022-01-30 | End: 2022-01-30

## 2022-01-30 RX ORDER — MAGNESIUM SULFATE HEPTAHYDRATE 40 MG/ML
2 INJECTION, SOLUTION INTRAVENOUS AS NEEDED
Status: DISCONTINUED | OUTPATIENT
Start: 2022-01-30 | End: 2022-02-07 | Stop reason: HOSPADM

## 2022-01-30 RX ORDER — SODIUM CHLORIDE 0.9 % (FLUSH) 0.9 %
10 SYRINGE (ML) INJECTION AS NEEDED
Status: DISCONTINUED | OUTPATIENT
Start: 2022-01-30 | End: 2022-01-31

## 2022-01-30 RX ORDER — ACETAMINOPHEN 160 MG/5ML
650 SOLUTION ORAL EVERY 4 HOURS PRN
Status: DISCONTINUED | OUTPATIENT
Start: 2022-01-30 | End: 2022-02-07 | Stop reason: HOSPADM

## 2022-01-30 RX ORDER — ONDANSETRON 4 MG/1
4 TABLET, FILM COATED ORAL EVERY 6 HOURS PRN
Status: DISCONTINUED | OUTPATIENT
Start: 2022-01-30 | End: 2022-02-07 | Stop reason: HOSPADM

## 2022-01-30 RX ORDER — DEXTROMETHORPHAN POLISTIREX 30 MG/5ML
60 SUSPENSION ORAL EVERY 12 HOURS PRN
Status: DISCONTINUED | OUTPATIENT
Start: 2022-01-30 | End: 2022-02-07 | Stop reason: HOSPADM

## 2022-01-30 RX ORDER — NICOTINE 21 MG/24HR
1 PATCH, TRANSDERMAL 24 HOURS TRANSDERMAL
Status: DISCONTINUED | OUTPATIENT
Start: 2022-01-30 | End: 2022-01-31

## 2022-01-30 RX ORDER — NOREPINEPHRINE BIT/0.9 % NACL 8 MG/250ML
.02-.3 INFUSION BOTTLE (ML) INTRAVENOUS
Status: DISCONTINUED | OUTPATIENT
Start: 2022-01-30 | End: 2022-01-30

## 2022-01-30 RX ORDER — BISACODYL 5 MG/1
5 TABLET, DELAYED RELEASE ORAL DAILY PRN
Status: DISCONTINUED | OUTPATIENT
Start: 2022-01-30 | End: 2022-02-07 | Stop reason: HOSPADM

## 2022-01-30 RX ORDER — ACETAMINOPHEN 650 MG/1
650 SUPPOSITORY RECTAL EVERY 4 HOURS PRN
Status: DISCONTINUED | OUTPATIENT
Start: 2022-01-30 | End: 2022-02-07 | Stop reason: HOSPADM

## 2022-01-30 RX ORDER — ACETAMINOPHEN 325 MG/1
650 TABLET ORAL EVERY 4 HOURS PRN
Status: DISCONTINUED | OUTPATIENT
Start: 2022-01-30 | End: 2022-02-07 | Stop reason: HOSPADM

## 2022-01-30 RX ORDER — POTASSIUM CHLORIDE 1.5 G/1.77G
40 POWDER, FOR SOLUTION ORAL AS NEEDED
Status: DISCONTINUED | OUTPATIENT
Start: 2022-01-30 | End: 2022-02-07 | Stop reason: HOSPADM

## 2022-01-30 RX ORDER — POTASSIUM CHLORIDE 7.45 MG/ML
10 INJECTION INTRAVENOUS
Status: DISCONTINUED | OUTPATIENT
Start: 2022-01-30 | End: 2022-02-07 | Stop reason: HOSPADM

## 2022-01-30 RX ORDER — BUDESONIDE AND FORMOTEROL FUMARATE DIHYDRATE 160; 4.5 UG/1; UG/1
2 AEROSOL RESPIRATORY (INHALATION)
Status: DISCONTINUED | OUTPATIENT
Start: 2022-01-30 | End: 2022-01-31

## 2022-01-30 RX ADMIN — SODIUM CHLORIDE 1 G: 900 INJECTION INTRAVENOUS at 11:52

## 2022-01-30 RX ADMIN — Medication 0.02 MCG/KG/MIN: at 21:34

## 2022-01-30 RX ADMIN — ACETAMINOPHEN 650 MG: 325 TABLET, FILM COATED ORAL at 20:44

## 2022-01-30 RX ADMIN — Medication 1 PATCH: at 17:17

## 2022-01-30 RX ADMIN — SODIUM CHLORIDE, PRESERVATIVE FREE 10 ML: 5 INJECTION INTRAVENOUS at 20:12

## 2022-01-30 RX ADMIN — MAGNESIUM SULFATE HEPTAHYDRATE 4 G: 40 INJECTION, SOLUTION INTRAVENOUS at 20:09

## 2022-01-30 RX ADMIN — Medication 1 TABLET: at 17:18

## 2022-01-30 RX ADMIN — SODIUM CHLORIDE 1000 ML: 9 INJECTION, SOLUTION INTRAVENOUS at 08:42

## 2022-01-30 RX ADMIN — DEXAMETHASONE SODIUM PHOSPHATE 10 MG: 10 INJECTION, SOLUTION INTRAMUSCULAR; INTRAVENOUS at 08:44

## 2022-01-30 RX ADMIN — POTASSIUM CHLORIDE 40 MEQ: 1.5 FOR SOLUTION ORAL at 23:26

## 2022-01-30 RX ADMIN — ENOXAPARIN SODIUM 40 MG: 40 INJECTION SUBCUTANEOUS at 20:12

## 2022-01-30 RX ADMIN — AZITHROMYCIN 500 MG: 500 INJECTION, POWDER, LYOPHILIZED, FOR SOLUTION INTRAVENOUS at 12:24

## 2022-01-30 RX ADMIN — REMDESIVIR 200 MG: 100 INJECTION, POWDER, LYOPHILIZED, FOR SOLUTION INTRAVENOUS at 17:18

## 2022-01-30 RX ADMIN — FUROSEMIDE 60 MG: 10 INJECTION INTRAMUSCULAR; INTRAVENOUS at 14:27

## 2022-01-30 RX ADMIN — BUDESONIDE AND FORMOTEROL FUMARATE DIHYDRATE 2 PUFF: 160; 4.5 AEROSOL RESPIRATORY (INHALATION) at 20:41

## 2022-01-30 RX ADMIN — IOPAMIDOL 75 ML: 755 INJECTION, SOLUTION INTRAVENOUS at 10:16

## 2022-01-31 ENCOUNTER — APPOINTMENT (OUTPATIENT)
Dept: GENERAL RADIOLOGY | Facility: HOSPITAL | Age: 62
End: 2022-01-31

## 2022-01-31 LAB
ALBUMIN SERPL-MCNC: 3.1 G/DL (ref 3.5–5.2)
ALBUMIN/GLOB SERPL: 1.4 G/DL
ALP SERPL-CCNC: 94 U/L (ref 39–117)
ALT SERPL W P-5'-P-CCNC: 31 U/L (ref 1–33)
AMYLASE SERPL-CCNC: 180 U/L (ref 28–100)
ANION GAP SERPL CALCULATED.3IONS-SCNC: 9 MMOL/L (ref 5–15)
AST SERPL-CCNC: 60 U/L (ref 1–32)
BASOPHILS # BLD AUTO: 0.01 10*3/MM3 (ref 0–0.2)
BASOPHILS NFR BLD AUTO: 0.1 % (ref 0–1.5)
BILIRUB CONJ SERPL-MCNC: <0.2 MG/DL (ref 0–0.3)
BILIRUB SERPL-MCNC: 0.3 MG/DL (ref 0–1.2)
BUN SERPL-MCNC: 17 MG/DL (ref 8–23)
BUN/CREAT SERPL: 37.8 (ref 7–25)
CALCIUM SPEC-SCNC: 7.4 MG/DL (ref 8.6–10.5)
CHLORIDE SERPL-SCNC: 98 MMOL/L (ref 98–107)
CO2 SERPL-SCNC: 29 MMOL/L (ref 22–29)
CREAT SERPL-MCNC: 0.45 MG/DL (ref 0.57–1)
CRP SERPL-MCNC: 6.28 MG/DL (ref 0–0.5)
DEPRECATED RDW RBC AUTO: 49.9 FL (ref 37–54)
EOSINOPHIL # BLD AUTO: 0.18 10*3/MM3 (ref 0–0.4)
EOSINOPHIL NFR BLD AUTO: 2.1 % (ref 0.3–6.2)
ERYTHROCYTE [DISTWIDTH] IN BLOOD BY AUTOMATED COUNT: 14.5 % (ref 12.3–15.4)
FERRITIN SERPL-MCNC: 533.7 NG/ML (ref 13–150)
GFR SERPL CREATININE-BSD FRML MDRD: 142 ML/MIN/1.73
GLOBULIN UR ELPH-MCNC: 2.2 GM/DL
GLUCOSE SERPL-MCNC: 106 MG/DL (ref 65–99)
HCT VFR BLD AUTO: 46 % (ref 34–46.6)
HGB BLD-MCNC: 15.2 G/DL (ref 12–15.9)
IMM GRANULOCYTES # BLD AUTO: 0.06 10*3/MM3 (ref 0–0.05)
IMM GRANULOCYTES NFR BLD AUTO: 0.7 % (ref 0–0.5)
LIPASE SERPL-CCNC: 79 U/L (ref 13–60)
LYMPHOCYTES # BLD AUTO: 2.02 10*3/MM3 (ref 0.7–3.1)
LYMPHOCYTES NFR BLD AUTO: 24 % (ref 19.6–45.3)
MAGNESIUM SERPL-MCNC: 3.1 MG/DL (ref 1.6–2.4)
MCH RBC QN AUTO: 31 PG (ref 26.6–33)
MCHC RBC AUTO-ENTMCNC: 33 G/DL (ref 31.5–35.7)
MCV RBC AUTO: 93.9 FL (ref 79–97)
MONOCYTES # BLD AUTO: 0.81 10*3/MM3 (ref 0.1–0.9)
MONOCYTES NFR BLD AUTO: 9.6 % (ref 5–12)
NEUTROPHILS NFR BLD AUTO: 5.33 10*3/MM3 (ref 1.7–7)
NEUTROPHILS NFR BLD AUTO: 63.5 % (ref 42.7–76)
NRBC BLD AUTO-RTO: 0 /100 WBC (ref 0–0.2)
PLATELET # BLD AUTO: 174 10*3/MM3 (ref 140–450)
PMV BLD AUTO: 11.9 FL (ref 6–12)
POTASSIUM SERPL-SCNC: 4.4 MMOL/L (ref 3.5–5.2)
PROT SERPL-MCNC: 5.3 G/DL (ref 6–8.5)
QT INTERVAL: 322 MS
QT INTERVAL: 450 MS
QTC INTERVAL: 378 MS
QTC INTERVAL: 450 MS
RBC # BLD AUTO: 4.9 10*6/MM3 (ref 3.77–5.28)
SODIUM SERPL-SCNC: 136 MMOL/L (ref 136–145)
WBC NRBC COR # BLD: 8.41 10*3/MM3 (ref 3.4–10.8)

## 2022-01-31 PROCEDURE — 02HV33Z INSERTION OF INFUSION DEVICE INTO SUPERIOR VENA CAVA, PERCUTANEOUS APPROACH: ICD-10-PCS | Performed by: INTERNAL MEDICINE

## 2022-01-31 PROCEDURE — 94799 UNLISTED PULMONARY SVC/PX: CPT

## 2022-01-31 PROCEDURE — 85025 COMPLETE CBC W/AUTO DIFF WBC: CPT | Performed by: NURSE PRACTITIONER

## 2022-01-31 PROCEDURE — 80053 COMPREHEN METABOLIC PANEL: CPT | Performed by: NURSE PRACTITIONER

## 2022-01-31 PROCEDURE — 82150 ASSAY OF AMYLASE: CPT | Performed by: NURSE PRACTITIONER

## 2022-01-31 PROCEDURE — 93005 ELECTROCARDIOGRAM TRACING: CPT | Performed by: INTERNAL MEDICINE

## 2022-01-31 PROCEDURE — 25010000002 AZITHROMYCIN PER 500 MG: Performed by: INTERNAL MEDICINE

## 2022-01-31 PROCEDURE — 83735 ASSAY OF MAGNESIUM: CPT | Performed by: NURSE PRACTITIONER

## 2022-01-31 PROCEDURE — C1894 INTRO/SHEATH, NON-LASER: HCPCS

## 2022-01-31 PROCEDURE — 93010 ELECTROCARDIOGRAM REPORT: CPT | Performed by: INTERNAL MEDICINE

## 2022-01-31 PROCEDURE — 86140 C-REACTIVE PROTEIN: CPT | Performed by: NURSE PRACTITIONER

## 2022-01-31 PROCEDURE — 25010000002 ENOXAPARIN PER 10 MG: Performed by: NURSE PRACTITIONER

## 2022-01-31 PROCEDURE — 25010000002 REMDESIVIR 100 MG/20ML SOLUTION 1 EACH VIAL: Performed by: NURSE PRACTITIONER

## 2022-01-31 PROCEDURE — 71045 X-RAY EXAM CHEST 1 VIEW: CPT

## 2022-01-31 PROCEDURE — 92610 EVALUATE SWALLOWING FUNCTION: CPT

## 2022-01-31 PROCEDURE — 25010000002 CEFTRIAXONE PER 250 MG: Performed by: NURSE PRACTITIONER

## 2022-01-31 PROCEDURE — 82728 ASSAY OF FERRITIN: CPT | Performed by: NURSE PRACTITIONER

## 2022-01-31 PROCEDURE — 63710000001 DEXAMETHASONE PER 0.25 MG: Performed by: NURSE PRACTITIONER

## 2022-01-31 PROCEDURE — 83690 ASSAY OF LIPASE: CPT | Performed by: NURSE PRACTITIONER

## 2022-01-31 PROCEDURE — 99233 SBSQ HOSP IP/OBS HIGH 50: CPT | Performed by: INTERNAL MEDICINE

## 2022-01-31 PROCEDURE — 82248 BILIRUBIN DIRECT: CPT | Performed by: NURSE PRACTITIONER

## 2022-01-31 PROCEDURE — C1751 CATH, INF, PER/CENT/MIDLINE: HCPCS

## 2022-01-31 RX ORDER — ALBUTEROL SULFATE 90 UG/1
2 AEROSOL, METERED RESPIRATORY (INHALATION)
Status: DISCONTINUED | OUTPATIENT
Start: 2022-01-31 | End: 2022-02-07 | Stop reason: HOSPADM

## 2022-01-31 RX ORDER — PANTOPRAZOLE SODIUM 40 MG/1
40 TABLET, DELAYED RELEASE ORAL
Status: DISCONTINUED | OUTPATIENT
Start: 2022-02-01 | End: 2022-02-07 | Stop reason: HOSPADM

## 2022-01-31 RX ORDER — SODIUM CHLORIDE 0.9 % (FLUSH) 0.9 %
10 SYRINGE (ML) INJECTION AS NEEDED
Status: DISCONTINUED | OUTPATIENT
Start: 2022-01-31 | End: 2022-02-07 | Stop reason: HOSPADM

## 2022-01-31 RX ORDER — COSYNTROPIN 0.25 MG/ML
0.25 INJECTION, POWDER, FOR SOLUTION INTRAMUSCULAR; INTRAVENOUS ONCE
Status: COMPLETED | OUTPATIENT
Start: 2022-02-01 | End: 2022-02-01

## 2022-01-31 RX ORDER — GUAIFENESIN 600 MG/1
1200 TABLET, EXTENDED RELEASE ORAL EVERY 12 HOURS SCHEDULED
Status: DISCONTINUED | OUTPATIENT
Start: 2022-01-31 | End: 2022-02-07 | Stop reason: HOSPADM

## 2022-01-31 RX ORDER — ALBUTEROL SULFATE 90 UG/1
2 AEROSOL, METERED RESPIRATORY (INHALATION) EVERY 4 HOURS PRN
Status: DISCONTINUED | OUTPATIENT
Start: 2022-01-31 | End: 2022-02-07 | Stop reason: HOSPADM

## 2022-01-31 RX ORDER — SODIUM CHLORIDE 0.9 % (FLUSH) 0.9 %
10 SYRINGE (ML) INJECTION EVERY 12 HOURS SCHEDULED
Status: DISCONTINUED | OUTPATIENT
Start: 2022-01-31 | End: 2022-02-07 | Stop reason: HOSPADM

## 2022-01-31 RX ORDER — SODIUM CHLORIDE 0.9 % (FLUSH) 0.9 %
20 SYRINGE (ML) INJECTION AS NEEDED
Status: DISCONTINUED | OUTPATIENT
Start: 2022-01-31 | End: 2022-02-07 | Stop reason: HOSPADM

## 2022-01-31 RX ORDER — NICOTINE 21 MG/24HR
1 PATCH, TRANSDERMAL 24 HOURS TRANSDERMAL
Status: DISCONTINUED | OUTPATIENT
Start: 2022-02-01 | End: 2022-02-03

## 2022-01-31 RX ADMIN — AZITHROMYCIN MONOHYDRATE 500 MG: 500 INJECTION, POWDER, LYOPHILIZED, FOR SOLUTION INTRAVENOUS at 11:07

## 2022-01-31 RX ADMIN — BUDESONIDE AND FORMOTEROL FUMARATE DIHYDRATE 2 PUFF: 160; 4.5 AEROSOL RESPIRATORY (INHALATION) at 07:30

## 2022-01-31 RX ADMIN — SENNOSIDES AND DOCUSATE SODIUM 2 TABLET: 50; 8.6 TABLET ORAL at 08:51

## 2022-01-31 RX ADMIN — Medication 1 TABLET: at 08:52

## 2022-01-31 RX ADMIN — Medication 1 PATCH: at 08:59

## 2022-01-31 RX ADMIN — SODIUM CHLORIDE, PRESERVATIVE FREE 10 ML: 5 INJECTION INTRAVENOUS at 20:24

## 2022-01-31 RX ADMIN — GUAIFENESIN 1200 MG: 600 TABLET, EXTENDED RELEASE ORAL at 12:31

## 2022-01-31 RX ADMIN — SODIUM CHLORIDE 1 G: 900 INJECTION INTRAVENOUS at 08:51

## 2022-01-31 RX ADMIN — ALBUTEROL SULFATE 2 PUFF: 90 AEROSOL, METERED RESPIRATORY (INHALATION) at 16:04

## 2022-01-31 RX ADMIN — DEXAMETHASONE 6 MG: 2 TABLET ORAL at 08:52

## 2022-01-31 RX ADMIN — PANCRELIPASE 36000 UNITS OF LIPASE: 36000; 180000; 114000 CAPSULE, DELAYED RELEASE PELLETS ORAL at 17:30

## 2022-01-31 RX ADMIN — ALBUTEROL SULFATE 2 PUFF: 90 AEROSOL, METERED RESPIRATORY (INHALATION) at 12:13

## 2022-01-31 RX ADMIN — ALBUTEROL SULFATE 2 PUFF: 90 AEROSOL, METERED RESPIRATORY (INHALATION) at 19:35

## 2022-01-31 RX ADMIN — REMDESIVIR 100 MG: 100 INJECTION, POWDER, LYOPHILIZED, FOR SOLUTION INTRAVENOUS at 14:50

## 2022-01-31 RX ADMIN — ENOXAPARIN SODIUM 40 MG: 40 INJECTION SUBCUTANEOUS at 20:24

## 2022-01-31 RX ADMIN — TIOTROPIUM BROMIDE INHALATION SPRAY 2 PUFF: 3.12 SPRAY, METERED RESPIRATORY (INHALATION) at 07:34

## 2022-01-31 RX ADMIN — PANCRELIPASE 36000 UNITS OF LIPASE: 36000; 180000; 114000 CAPSULE, DELAYED RELEASE PELLETS ORAL at 12:31

## 2022-01-31 RX ADMIN — SODIUM CHLORIDE, PRESERVATIVE FREE 10 ML: 5 INJECTION INTRAVENOUS at 08:52

## 2022-01-31 RX ADMIN — GUAIFENESIN 1200 MG: 600 TABLET, EXTENDED RELEASE ORAL at 20:24

## 2022-01-31 RX ADMIN — POTASSIUM CHLORIDE 40 MEQ: 1.5 FOR SOLUTION ORAL at 04:20

## 2022-01-31 RX ADMIN — SODIUM CHLORIDE, PRESERVATIVE FREE 10 ML: 5 INJECTION INTRAVENOUS at 11:08

## 2022-02-01 LAB
ALBUMIN SERPL-MCNC: 3.3 G/DL (ref 3.5–5.2)
ALBUMIN/GLOB SERPL: 1.3 G/DL
ALP SERPL-CCNC: 95 U/L (ref 39–117)
ALT SERPL W P-5'-P-CCNC: 28 U/L (ref 1–33)
ANION GAP SERPL CALCULATED.3IONS-SCNC: 10 MMOL/L (ref 5–15)
AST SERPL-CCNC: 42 U/L (ref 1–32)
BILIRUB CONJ SERPL-MCNC: <0.2 MG/DL (ref 0–0.3)
BILIRUB SERPL-MCNC: 0.3 MG/DL (ref 0–1.2)
BUN SERPL-MCNC: 18 MG/DL (ref 8–23)
BUN/CREAT SERPL: 41.9 (ref 7–25)
CALCIUM SPEC-SCNC: 8.2 MG/DL (ref 8.6–10.5)
CHLORIDE SERPL-SCNC: 99 MMOL/L (ref 98–107)
CO2 SERPL-SCNC: 28 MMOL/L (ref 22–29)
CORTIS SERPL-MCNC: 17.53 MCG/DL
CORTIS SERPL-MCNC: 20.22 MCG/DL
CREAT SERPL-MCNC: 0.43 MG/DL (ref 0.57–1)
CRP SERPL-MCNC: 4.74 MG/DL (ref 0–0.5)
D DIMER PPP FEU-MCNC: 2.16 MCGFEU/ML (ref 0–0.56)
FERRITIN SERPL-MCNC: 456.3 NG/ML (ref 13–150)
GFR SERPL CREATININE-BSD FRML MDRD: 149 ML/MIN/1.73
GLOBULIN UR ELPH-MCNC: 2.5 GM/DL
GLUCOSE SERPL-MCNC: 109 MG/DL (ref 65–99)
LDH SERPL-CCNC: 488 U/L (ref 135–214)
MRSA DNA SPEC QL NAA+PROBE: NEGATIVE
POTASSIUM SERPL-SCNC: 5.2 MMOL/L (ref 3.5–5.2)
PROCALCITONIN SERPL-MCNC: 0.08 NG/ML (ref 0–0.25)
PROT SERPL-MCNC: 5.8 G/DL (ref 6–8.5)
SODIUM SERPL-SCNC: 137 MMOL/L (ref 136–145)
TSH SERPL DL<=0.05 MIU/L-ACNC: 0.19 UIU/ML (ref 0.27–4.2)

## 2022-02-01 PROCEDURE — 83615 LACTATE (LD) (LDH) ENZYME: CPT | Performed by: INTERNAL MEDICINE

## 2022-02-01 PROCEDURE — 99232 SBSQ HOSP IP/OBS MODERATE 35: CPT | Performed by: INTERNAL MEDICINE

## 2022-02-01 PROCEDURE — 25010000002 CEFTRIAXONE PER 250 MG: Performed by: INTERNAL MEDICINE

## 2022-02-01 PROCEDURE — 82533 TOTAL CORTISOL: CPT | Performed by: INTERNAL MEDICINE

## 2022-02-01 PROCEDURE — 25010000002 REMDESIVIR 100 MG/20ML SOLUTION 1 EACH VIAL: Performed by: NURSE PRACTITIONER

## 2022-02-01 PROCEDURE — 87070 CULTURE OTHR SPECIMN AEROBIC: CPT | Performed by: INTERNAL MEDICINE

## 2022-02-01 PROCEDURE — 86140 C-REACTIVE PROTEIN: CPT | Performed by: INTERNAL MEDICINE

## 2022-02-01 PROCEDURE — 82728 ASSAY OF FERRITIN: CPT | Performed by: NURSE PRACTITIONER

## 2022-02-01 PROCEDURE — 87205 SMEAR GRAM STAIN: CPT | Performed by: INTERNAL MEDICINE

## 2022-02-01 PROCEDURE — 80053 COMPREHEN METABOLIC PANEL: CPT | Performed by: NURSE PRACTITIONER

## 2022-02-01 PROCEDURE — 63710000001 DEXAMETHASONE PER 0.25 MG: Performed by: NURSE PRACTITIONER

## 2022-02-01 PROCEDURE — 82248 BILIRUBIN DIRECT: CPT | Performed by: NURSE PRACTITIONER

## 2022-02-01 PROCEDURE — 25010000002 ENOXAPARIN PER 10 MG: Performed by: NURSE PRACTITIONER

## 2022-02-01 PROCEDURE — 87641 MR-STAPH DNA AMP PROBE: CPT | Performed by: INTERNAL MEDICINE

## 2022-02-01 PROCEDURE — 94761 N-INVAS EAR/PLS OXIMETRY MLT: CPT

## 2022-02-01 PROCEDURE — 25010000002 COSYNTROPIN PER 0.25 MG: Performed by: INTERNAL MEDICINE

## 2022-02-01 PROCEDURE — 25010000002 AZITHROMYCIN PER 500 MG: Performed by: INTERNAL MEDICINE

## 2022-02-01 PROCEDURE — 94799 UNLISTED PULMONARY SVC/PX: CPT

## 2022-02-01 PROCEDURE — 25010000002 ONDANSETRON PER 1 MG: Performed by: NURSE PRACTITIONER

## 2022-02-01 PROCEDURE — 84145 PROCALCITONIN (PCT): CPT | Performed by: INTERNAL MEDICINE

## 2022-02-01 PROCEDURE — 85379 FIBRIN DEGRADATION QUANT: CPT | Performed by: INTERNAL MEDICINE

## 2022-02-01 PROCEDURE — 84443 ASSAY THYROID STIM HORMONE: CPT | Performed by: NURSE PRACTITIONER

## 2022-02-01 RX ADMIN — SODIUM CHLORIDE, PRESERVATIVE FREE 10 ML: 5 INJECTION INTRAVENOUS at 08:46

## 2022-02-01 RX ADMIN — AZITHROMYCIN MONOHYDRATE 500 MG: 500 INJECTION, POWDER, LYOPHILIZED, FOR SOLUTION INTRAVENOUS at 11:27

## 2022-02-01 RX ADMIN — DEXAMETHASONE 6 MG: 2 TABLET ORAL at 08:46

## 2022-02-01 RX ADMIN — PANTOPRAZOLE SODIUM 40 MG: 40 TABLET, DELAYED RELEASE ORAL at 05:17

## 2022-02-01 RX ADMIN — REMDESIVIR 100 MG: 100 INJECTION, POWDER, LYOPHILIZED, FOR SOLUTION INTRAVENOUS at 11:27

## 2022-02-01 RX ADMIN — PANTOPRAZOLE SODIUM 40 MG: 40 TABLET, DELAYED RELEASE ORAL at 00:11

## 2022-02-01 RX ADMIN — COSYNTROPIN 0.25 MG: 0.25 INJECTION, POWDER, LYOPHILIZED, FOR SOLUTION INTRAVENOUS at 08:46

## 2022-02-01 RX ADMIN — GUAIFENESIN 1200 MG: 600 TABLET, EXTENDED RELEASE ORAL at 08:46

## 2022-02-01 RX ADMIN — ONDANSETRON 4 MG: 2 INJECTION INTRAMUSCULAR; INTRAVENOUS at 12:04

## 2022-02-01 RX ADMIN — PANCRELIPASE 36000 UNITS OF LIPASE: 36000; 180000; 114000 CAPSULE, DELAYED RELEASE PELLETS ORAL at 16:45

## 2022-02-01 RX ADMIN — ONDANSETRON 4 MG: 2 INJECTION INTRAMUSCULAR; INTRAVENOUS at 03:24

## 2022-02-01 RX ADMIN — ALBUTEROL SULFATE 2 PUFF: 90 AEROSOL, METERED RESPIRATORY (INHALATION) at 13:59

## 2022-02-01 RX ADMIN — PANCRELIPASE 36000 UNITS OF LIPASE: 36000; 180000; 114000 CAPSULE, DELAYED RELEASE PELLETS ORAL at 09:00

## 2022-02-01 RX ADMIN — Medication 1 TABLET: at 08:46

## 2022-02-01 RX ADMIN — ENOXAPARIN SODIUM 40 MG: 40 INJECTION SUBCUTANEOUS at 21:21

## 2022-02-01 RX ADMIN — SODIUM CHLORIDE 1 G: 900 INJECTION INTRAVENOUS at 08:47

## 2022-02-01 RX ADMIN — SENNOSIDES AND DOCUSATE SODIUM 2 TABLET: 50; 8.6 TABLET ORAL at 08:47

## 2022-02-01 RX ADMIN — ALBUTEROL SULFATE 2 PUFF: 90 AEROSOL, METERED RESPIRATORY (INHALATION) at 19:57

## 2022-02-01 RX ADMIN — GUAIFENESIN 1200 MG: 600 TABLET, EXTENDED RELEASE ORAL at 21:22

## 2022-02-01 RX ADMIN — ALBUTEROL SULFATE 2 PUFF: 90 AEROSOL, METERED RESPIRATORY (INHALATION) at 17:13

## 2022-02-01 RX ADMIN — NICOTINE 1 PATCH: 14 PATCH, EXTENDED RELEASE TRANSDERMAL at 08:47

## 2022-02-01 RX ADMIN — PANCRELIPASE 36000 UNITS OF LIPASE: 36000; 180000; 114000 CAPSULE, DELAYED RELEASE PELLETS ORAL at 11:27

## 2022-02-01 RX ADMIN — TIOTROPIUM BROMIDE INHALATION SPRAY 2 PUFF: 3.12 SPRAY, METERED RESPIRATORY (INHALATION) at 14:00

## 2022-02-01 RX ADMIN — SODIUM CHLORIDE, PRESERVATIVE FREE 10 ML: 5 INJECTION INTRAVENOUS at 21:21
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
## 2022-02-01 NOTE — PLAN OF CARE
Problem: Adult Inpatient Plan of Care  Goal: Plan of Care Review  Outcome: Ongoing, Progressing  Flowsheets (Taken 2/1/2022 8709)  Progress: improving  Plan of Care Reviewed With: patient  Outcome Summary: Pt tolerating 4-6LNC this shift. Levophed weaned off. UOP 770mls, la d/c this AM. Zofran given x1, pt is relating nausea to eating dinner last night.
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
## 2022-02-02 LAB
ALBUMIN SERPL-MCNC: 3 G/DL (ref 3.5–5.2)
ALBUMIN/GLOB SERPL: 1.4 G/DL
ALP SERPL-CCNC: 80 U/L (ref 39–117)
ALT SERPL W P-5'-P-CCNC: 24 U/L (ref 1–33)
ANION GAP SERPL CALCULATED.3IONS-SCNC: 5 MMOL/L (ref 5–15)
AST SERPL-CCNC: 31 U/L (ref 1–32)
BASOPHILS # BLD AUTO: 0.01 10*3/MM3 (ref 0–0.2)
BASOPHILS NFR BLD AUTO: 0.2 % (ref 0–1.5)
BILIRUB SERPL-MCNC: 0.2 MG/DL (ref 0–1.2)
BUN SERPL-MCNC: 18 MG/DL (ref 8–23)
BUN/CREAT SERPL: 38.3 (ref 7–25)
CALCIUM SPEC-SCNC: 8.1 MG/DL (ref 8.6–10.5)
CHLORIDE SERPL-SCNC: 104 MMOL/L (ref 98–107)
CO2 SERPL-SCNC: 30 MMOL/L (ref 22–29)
CREAT SERPL-MCNC: 0.47 MG/DL (ref 0.57–1)
DEPRECATED RDW RBC AUTO: 50.5 FL (ref 37–54)
EOSINOPHIL # BLD AUTO: 0.01 10*3/MM3 (ref 0–0.4)
EOSINOPHIL NFR BLD AUTO: 0.2 % (ref 0.3–6.2)
ERYTHROCYTE [DISTWIDTH] IN BLOOD BY AUTOMATED COUNT: 14.6 % (ref 12.3–15.4)
GFR SERPL CREATININE-BSD FRML MDRD: 135 ML/MIN/1.73
GLOBULIN UR ELPH-MCNC: 2.1 GM/DL
GLUCOSE SERPL-MCNC: 89 MG/DL (ref 65–99)
HCT VFR BLD AUTO: 40.3 % (ref 34–46.6)
HGB BLD-MCNC: 13.5 G/DL (ref 12–15.9)
IMM GRANULOCYTES # BLD AUTO: 0.04 10*3/MM3 (ref 0–0.05)
IMM GRANULOCYTES NFR BLD AUTO: 0.7 % (ref 0–0.5)
LYMPHOCYTES # BLD AUTO: 1.89 10*3/MM3 (ref 0.7–3.1)
LYMPHOCYTES NFR BLD AUTO: 30.8 % (ref 19.6–45.3)
MCH RBC QN AUTO: 31.1 PG (ref 26.6–33)
MCHC RBC AUTO-ENTMCNC: 33.5 G/DL (ref 31.5–35.7)
MCV RBC AUTO: 92.9 FL (ref 79–97)
MONOCYTES # BLD AUTO: 0.91 10*3/MM3 (ref 0.1–0.9)
MONOCYTES NFR BLD AUTO: 14.8 % (ref 5–12)
NEUTROPHILS NFR BLD AUTO: 3.28 10*3/MM3 (ref 1.7–7)
NEUTROPHILS NFR BLD AUTO: 53.3 % (ref 42.7–76)
NRBC BLD AUTO-RTO: 0 /100 WBC (ref 0–0.2)
PLATELET # BLD AUTO: 185 10*3/MM3 (ref 140–450)
PMV BLD AUTO: 11.2 FL (ref 6–12)
POTASSIUM SERPL-SCNC: 5.3 MMOL/L (ref 3.5–5.2)
PROT SERPL-MCNC: 5.1 G/DL (ref 6–8.5)
RBC # BLD AUTO: 4.34 10*6/MM3 (ref 3.77–5.28)
SODIUM SERPL-SCNC: 139 MMOL/L (ref 136–145)
T4 FREE SERPL-MCNC: 1.33 NG/DL (ref 0.93–1.7)
WBC NRBC COR # BLD: 6.14 10*3/MM3 (ref 3.4–10.8)

## 2022-02-02 PROCEDURE — 25010000002 CEFTRIAXONE PER 250 MG: Performed by: INTERNAL MEDICINE

## 2022-02-02 PROCEDURE — 97166 OT EVAL MOD COMPLEX 45 MIN: CPT

## 2022-02-02 PROCEDURE — 84439 ASSAY OF FREE THYROXINE: CPT | Performed by: INTERNAL MEDICINE

## 2022-02-02 PROCEDURE — 25010000002 REMDESIVIR 100 MG/20ML SOLUTION 1 EACH VIAL: Performed by: NURSE PRACTITIONER

## 2022-02-02 PROCEDURE — 85025 COMPLETE CBC W/AUTO DIFF WBC: CPT | Performed by: NURSE PRACTITIONER

## 2022-02-02 PROCEDURE — 80053 COMPREHEN METABOLIC PANEL: CPT | Performed by: NURSE PRACTITIONER

## 2022-02-02 PROCEDURE — 94799 UNLISTED PULMONARY SVC/PX: CPT

## 2022-02-02 PROCEDURE — 63710000001 ONDANSETRON PER 8 MG: Performed by: NURSE PRACTITIONER

## 2022-02-02 PROCEDURE — 25010000002 AZITHROMYCIN PER 500 MG: Performed by: INTERNAL MEDICINE

## 2022-02-02 PROCEDURE — 25010000002 ENOXAPARIN PER 10 MG: Performed by: NURSE PRACTITIONER

## 2022-02-02 PROCEDURE — 97162 PT EVAL MOD COMPLEX 30 MIN: CPT

## 2022-02-02 PROCEDURE — 99233 SBSQ HOSP IP/OBS HIGH 50: CPT | Performed by: INTERNAL MEDICINE

## 2022-02-02 PROCEDURE — 63710000001 DEXAMETHASONE PER 0.25 MG: Performed by: NURSE PRACTITIONER

## 2022-02-02 PROCEDURE — 25010000002 ONDANSETRON PER 1 MG: Performed by: NURSE PRACTITIONER

## 2022-02-02 RX ADMIN — ONDANSETRON 4 MG: 2 INJECTION INTRAMUSCULAR; INTRAVENOUS at 12:16

## 2022-02-02 RX ADMIN — Medication 1 TABLET: at 08:17

## 2022-02-02 RX ADMIN — PANCRELIPASE 36000 UNITS OF LIPASE: 36000; 180000; 114000 CAPSULE, DELAYED RELEASE PELLETS ORAL at 08:17

## 2022-02-02 RX ADMIN — SODIUM CHLORIDE, PRESERVATIVE FREE 10 ML: 5 INJECTION INTRAVENOUS at 20:06

## 2022-02-02 RX ADMIN — PANCRELIPASE 36000 UNITS OF LIPASE: 36000; 180000; 114000 CAPSULE, DELAYED RELEASE PELLETS ORAL at 12:03

## 2022-02-02 RX ADMIN — ALBUTEROL SULFATE 2 PUFF: 90 AEROSOL, METERED RESPIRATORY (INHALATION) at 20:37

## 2022-02-02 RX ADMIN — PANCRELIPASE 36000 UNITS OF LIPASE: 36000; 180000; 114000 CAPSULE, DELAYED RELEASE PELLETS ORAL at 17:00

## 2022-02-02 RX ADMIN — NICOTINE 1 PATCH: 14 PATCH, EXTENDED RELEASE TRANSDERMAL at 08:00

## 2022-02-02 RX ADMIN — PANTOPRAZOLE SODIUM 40 MG: 40 TABLET, DELAYED RELEASE ORAL at 05:40

## 2022-02-02 RX ADMIN — GUAIFENESIN 1200 MG: 600 TABLET, EXTENDED RELEASE ORAL at 20:05

## 2022-02-02 RX ADMIN — SODIUM CHLORIDE 1 G: 900 INJECTION INTRAVENOUS at 08:18

## 2022-02-02 RX ADMIN — ALBUTEROL SULFATE 2 PUFF: 90 AEROSOL, METERED RESPIRATORY (INHALATION) at 08:10

## 2022-02-02 RX ADMIN — ACETAMINOPHEN 650 MG: 325 TABLET, FILM COATED ORAL at 22:01

## 2022-02-02 RX ADMIN — ALBUTEROL SULFATE 2 PUFF: 90 AEROSOL, METERED RESPIRATORY (INHALATION) at 16:05

## 2022-02-02 RX ADMIN — DEXAMETHASONE 6 MG: 2 TABLET ORAL at 08:17

## 2022-02-02 RX ADMIN — SODIUM CHLORIDE, PRESERVATIVE FREE 10 ML: 5 INJECTION INTRAVENOUS at 08:18

## 2022-02-02 RX ADMIN — ENOXAPARIN SODIUM 40 MG: 40 INJECTION SUBCUTANEOUS at 20:06

## 2022-02-02 RX ADMIN — ONDANSETRON HYDROCHLORIDE 4 MG: 4 TABLET, FILM COATED ORAL at 22:01

## 2022-02-02 RX ADMIN — AZITHROMYCIN MONOHYDRATE 500 MG: 500 INJECTION, POWDER, LYOPHILIZED, FOR SOLUTION INTRAVENOUS at 12:03

## 2022-02-02 RX ADMIN — TIOTROPIUM BROMIDE INHALATION SPRAY 2 PUFF: 3.12 SPRAY, METERED RESPIRATORY (INHALATION) at 08:11

## 2022-02-02 RX ADMIN — GUAIFENESIN 1200 MG: 600 TABLET, EXTENDED RELEASE ORAL at 08:17

## 2022-02-02 RX ADMIN — REMDESIVIR 100 MG: 100 INJECTION, POWDER, LYOPHILIZED, FOR SOLUTION INTRAVENOUS at 12:03

## 2022-02-02 NOTE — PLAN OF CARE
Problem: Adult Inpatient Plan of Care  Goal: Plan of Care Review  Outcome: Ongoing, Progressing  Flowsheets (Taken 2/2/2022 5610)  Progress: improving  Outcome Summary: No acute events overnight. Pt tolerating 3LNC. Levophed weaned off. Awaiting AM labs.
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
## 2022-02-02 NOTE — PROGRESS NOTES
Clinical Nutrition       Reason for Visit:   MDR, Identified at risk by screening criteria      Patient Name: Mary West  YOB: 1960  MRN: [id number]  Date of Encounter: 02/02/22 10:58 EST  Admission date: 1/30/2022      Nutrition Assessment   Assessment       Acute respiratory failure due to COVID-19 (HCC)    Severe centrilobular emphysema recently on home oxygen  (HCC)    Acute on chronic respiratory failure (HCC)    Remote history alcohol abuse    Tobacco dependence    Suspected community acquired pneumonia of RML and RLL of lung    Chronic Pancreatitis    Chronic narcotic use    Bradycardia    PMH: She  has a past medical history of Alcohol abuse, COPD (chronic obstructive pulmonary disease) (HCC), and Pancreatitis.   PSxH: She  has no past surgical history on file.       Reported/Observed/Food/Nutrition Related History:     Pt sitting up in bed, on nasal cannula, eating breakfast, reports appetite improved, worked with OT /PT this am    Per RN: pt off levophed,  eating better with encouragement, drinking boost, is allergic to green peppers, mushrooms    Anthropometrics     Height: 67in  Last filed wt: 72lb bedscale  BMI: 11.3  Underweight:<18.5kg/m2  UBW: 97-100lb    ? 25lb wt loss over 1 week           Last 15 Recorded Weights  View Complete Flowsheet  Weight Weight (kg) Weight (lbs) Weight Method   2/1/2022 32.7 kg 72 lb 1.5 oz Bed scale   1/30/2022 34 kg 74 lb 15.3 oz Bed scale   1/30/2022 43.999 kg 97 lb -   1/30/2022 43.999 kg 97 lb Stated   1/25/2022 43.999 kg 97 lb -   1/20/2019 44.453 kg 98 lb Stated       Labs reviewed     Results from last 7 days   Lab Units 02/02/22  0316 02/01/22  0318 01/31/22  0312 01/30/22  1514 01/30/22  0831   GLUCOSE mg/dL 89 109* 106*  --  114*   BUN mg/dL 18 18 17  --  20   CREATININE mg/dL 0.47* 0.43* 0.45*   < > 0.65   SODIUM mmol/L 139 137 136  --  138   CHLORIDE mmol/L 104 99 98  --  102   POTASSIUM mmol/L 5.3* 5.2 4.4   < > 3.9    MAGNESIUM mg/dL  --   --  3.1*  --  1.7   ALT (SGPT) U/L 24 28 31  --  36*    < > = values in this interval not displayed.     Results from last 7 days   Lab Units 02/02/22  0316 02/01/22  0318 01/31/22  0312 01/30/22  0831 01/30/22  0831   ALBUMIN g/dL 3.00* 3.30* 3.10*   < > 3.50   CRP mg/dL  --  4.74* 6.28*  --  3.23*    < > = values in this interval not displayed.           No results found for: HGBA1C      Medications reviewed   Pertinent:abx, decadron, MVI, remdesivir, creon, protonix, bowel regimen      Intake/Ouptut 24 hrs (7:00AM - 6:59 AM)     Intake & Output (last day)       02/01 0701  02/02 0700 02/02 0701 02/03 0700    P.O. 180     I.V. (mL/kg) 896.9 (27.4)     IV Piggyback 500     Total Intake(mL/kg) 1576.9 (48.2)     Urine (mL/kg/hr) 625 (0.8) 750 (5.8)    Stool 0     Total Output 625 750    Net +951.9 -750          Urine Unmeasured Occurrence 2 x     Stool Unmeasured Occurrence 2 x           Nutrition Focused Physical Exam Findings      Unable to perform exam due to: covid isolation    GI: chronic abdominal pain    SKIN:red/ blanchable    Needs Assessment 2-2-22       Height used 67in   Weight used 742b         Estimated need Method/Equation used Result    Energy/Calorie need  kcals/d 30-35kcal per kg 982-1145kcal    MSJ x 1.3 + 250kcal  1203-1453kcal   Initial goal ~1200kcal-1500kcal     Protein g/day 1.5-2.0g protein  49-65g protein   Goal ~65g protein                           Current Nutrition Prescription     PO: Diet Regular     Boost+ 3x/day    Intake: Calorie Count day #1: 2180kcal, 150% kcal needs, 102g protein, 157% protein needs      Nutrition Diagnosis     1-31-22, 2-2  Problem Underweight   Etiology Chronic Pancreatitis/ COPD   Signs/Symptoms BMI 11.6       Nutrition Intervention   1.  Follow treatment progress, Advise alternate selection, Advised available snacks, Interview for preferences, Menu provided, Menu adjusted, Encourage intake    Calorie Count Day #2 in progress    Goal:    General: Nutrition support treatment  PO: Increase intake    Monitoring/Evaluation:   Per protocol, I&O, PO intake, Pertinent labs, Weight, Skin status, GI status, Symptoms, Swallow function, Hemodynamic stability    Vanessa Farr RD, [facility]  Time Spent: 45min
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
## 2022-02-02 NOTE — CASE MANAGEMENT/SOCIAL WORK
Continued Stay Note  [facility]     Patient Name: Mary West  MRN: [id number]  Today's Date: 2/2/2022    Admit Date: 1/30/2022     Discharge Plan     Row Name 02/02/22 1435       Plan    Plan Home    Plan Comments Patient in isolation, on nasal cannula at present.  Discharge plan remains home at this time.  Discharge plan may change.  CM will continue to follow.               Discharge Codes    No documentation.               Expected Discharge Date and Time     Expected Discharge Date Expected Discharge Time    Feb 7, 2022             Alyson Estrella RN
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
## 2022-02-02 NOTE — PLAN OF CARE
Goal Outcome Evaluation:           Progress: no change (Initial Evaluation)  Outcome Summary: OT eval complete. Pt presenting w/ deficits generalized weakness, decreased activity tolerance/endurance, dyspnea w/ activity, decreased functional mobility and transfers, and increased need for assistance w/ self-care tasks. SUP for supine to sit on the EOB, CGA for STS from EOB to upright w/o AD. Pt desat to 83% w/ activity requiring approx 60 seconds to recover to 90%. IP OT warranted, POC initiated. Recommendation upon d/c for HWA and HHS.
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
## 2022-02-02 NOTE — PLAN OF CARE
Goal Outcome Evaluation:  Plan of Care Reviewed With: patient        Progress: improving  Outcome Summary: PT eval completed.  Patient presents w/ generalized weakness, mildly unsteady gait, decreased functional endurance, and functional decline from baseline.  She completed sit to stand transfers w/ CGA and ambulated 75ft w/ CGA.  Pt. readily fatigued w/ activity, desat to 83% on 3L per NC, required 1 min to recover.  Skilled IPPT indicated to promote return to PLOF.  Recommend home w/ assist and HHPT when medically appropriate.

## 2022-02-02 NOTE — PAYOR COMM NOTE
"Keo West (61 y.o. Female)             Date of Birth Social Security Number Address Home Phone MRN    1960  [address]  [place] [address] [phone] [id number]    Christian Marital Status             Taoism        Admission Date Admission Type Admitting Provider Attending Provider Department, Room/Bed    1/30/22 Emergency Case, Rebekah V., DO Case, Rebekah OVIDIO DO [facility] 2A ICU, N204/1    Discharge Date Discharge Disposition Discharge Destination                         Attending Provider: Rebekah Simpson DO    Allergies: Methadone    Isolation: Contact Air   Infection: COVID (confirmed) (01/25/22)   Code Status: CPR   Advance Care Planning Activity    Ht: 170.2 cm (67\")   Wt: 32.7 kg (72 lb 1.5 oz)    Admission Cmt: None   Principal Problem: Acute respiratory failure due to COVID-19 (HCC) [U07.1,J96.00]                 Active Insurance as of 1/30/2022     Primary Coverage     Payor Plan Insurance Group Employer/Plan Group    WELLCARE [facility] MEDICARE REPLACEMENT WELLCARE MEDICARE REPLACEMENT [id number] DUAL     Payor Plan Address Payor Plan Phone Number Payor Plan Fax Number Effective Dates    [address] [address] [phone]  1/20/2019 - None Entered    [facility] [id number]       Subscriber Name Subscriber Birth Date Member ID       KEO WEST 1960 [id number]           Secondary Coverage     Payor Plan Insurance Group Employer/Plan Group    WELLCARE [facility] WELLCARE MEDICAID [id number] DUAL     Payor Plan Address Payor Plan Phone Number Payor Plan Fax Number Effective Dates    [address] [address] [phone]  1/20/2019 - None Entered    [facility] [id number]       Subscriber Name Subscriber Birth Date Member ID       KEO WEST 1960 [id number]                 Emergency Contacts      (Rel.) Home Phone Work Phone Mobile Phone    Pearl Owens (Daughter) -- -- [phone]    Anna Watson (Friend) -- -- [phone]    "         Insurance Information                [facility]/WELLCARE MEDICARE REPLACEMENT Phone: [phone]    Subscriber: Mary West Subscriber#: [id number]    Group#: [id number] [id number] Precert#: --        WELL[place]/WELLCARE MEDICAID Phone: [phone]    Subscriber: Mary West Subscriber#: [id number]    Group#: [id number] DUAL Precert#: --          {Outbreak/Travel/Exposure Documentation......;  Question Available Choices Patient Response   COVID-19 Outbreak Screen:  Do you currently have a new onset of the following symptoms?        Fever/Chills, Cough, Shortness of air, Loss of taste or smell, No, Unknown  (!) Shortness of Air; Cough (01/30/22 0749)   COVID-19 Outbreak Screen: In the last 14 days, have you had contact with anyone who is ill, has show any of the symptoms listed above and/or has been diagnosis with the 2019 Novel Coronavirus? This includes any immediate household members but excludes any patients with whom you have been in contact within your normal work duties wearing proper PPE, if you are a healthcare worker.  Yes, No, Unknown              Unknown (01/30/22 0749)   COVID-19 Outbreak Screen: Who was notified? Free text (not recorded)   Ebola Screening Outbreak Screen: Have you traveled to the [place] or [place] within the past 21 days?  Yes, No, Unknown (not recorded)   Ebola Screening Outbreak Screen: Do you have ANY of the following symptoms: Fever/Chills, Vomiting, Diarrhea, Fatigue, Headache, Muscle pain, Unexplained bleeding, Abdominal (stomach) pain, No, Unknown (not recorded)   Ebola Screening Outbreak Screen: Name of Person notified Free text (not recorded)   Travel Screen: Have you traveled in the last month? If so, to what country have you traveled? If [place] what state? Yes, No, Unknown  List of all countries  List of all States No (01/30/22 0749)  (not recorded)  (not recorded)   Infection Risk: Do you currently  have the following symptoms?  (If cough is selected, the Tuberculosis Screen is performed.) Cough, Fever, Rash, No (!) Cough (01/30/22 0749)   Tuberculosis Screen: Do you have any of the following Tuberculosis Risks?  · Have you lived or spent time with anyone who had or may have TB?  · Have you lived in or visited any of the following areas for more than one month: [place], [place], [place], [place] or [place], the [place] or [place]?  · Do you have HIV/AIDS?  · Have you lived in or worked in a nursing home, homeless shelter, correctional facility, or substance abuse treatment facility?   · No    If Yes do you have any of the following symptoms? Yes responses display to the right    If Yes, symptoms listed are:  Cough greater than or equal to 3 weeks, Loss of appetite, Unexplained weight loss, Night sweats, Bloody sputum or hemoptysis, Hoarseness, Fever, Fatigue, Chest pain, No (not recorded)  (not recorded)   Exposure Screen: Have you been exposed to any of these contagious diseases in the last month? Measles, Chickenpox, Meningitis, Pertussis, Whooping Cough, No No (01/30/22 0749)       Current Facility-Administered Medications   Medication Dose Route Frequency Provider Last Rate Last Admin   • acetaminophen (TYLENOL) tablet 650 mg  650 mg Oral Q4H PRN Emmy Abarca APRN   650 mg at 01/30/22 2044    Or   • acetaminophen (TYLENOL) 160 MG/5ML solution 650 mg  650 mg Oral Q4H PRN Emmy Abarca APRN        Or   • acetaminophen (TYLENOL) suppository 650 mg  650 mg Rectal Q4H PRN Emmy Abarca APRN       • albuterol sulfate HFA (PROVENTIL HFA;VENTOLIN HFA;PROAIR HFA) inhaler 2 puff  2 puff Inhalation 4x Daily - RT Raman Miller MD   2 puff at 02/02/22 0810   • albuterol sulfate HFA (PROVENTIL HFA;VENTOLIN HFA;PROAIR HFA) inhaler 2 puff  2 puff Inhalation Q4H PRN Raman Miller MD       • AZITHROMYCIN 500 MG/250 ML 0.9% NS IVPB (vial-mate)  500 mg Intravenous Q24H Raman Miller MD   500 mg at  02/02/22 1203   • benzonatate (TESSALON) capsule 100 mg  100 mg Oral TID PRN Emmy Abarca APRN       • sennosides-docusate (PERICOLACE) 8.6-50 MG per tablet 2 tablet  2 tablet Oral BID Emmy Abarca APRN   2 tablet at 02/01/22 0847    And   • polyethylene glycol (MIRALAX) packet 17 g  17 g Oral Daily PRN Emmy Abarca APRN        And   • bisacodyl (DULCOLAX) EC tablet 5 mg  5 mg Oral Daily PRN Emmy Abarca APRN        And   • bisacodyl (DULCOLAX) suppository 10 mg  10 mg Rectal Daily PRN Emmy Abarca APRN       • calcium carbonate (TUMS) chewable tablet 500 mg (200 mg elemental)  2 tablet Oral BID PRN Emmy Abarca APRN       • cefTRIAXone (ROCEPHIN) 1 g/100 mL 0.9% NS (MBP)  1 g Intravenous Q24H Raman Miller MD   1 g at 02/02/22 0818   • dexamethasone (DECADRON) tablet 6 mg  6 mg Oral Daily Emmy Abarca APRN   6 mg at 02/02/22 0817    Or   • dexamethasone (DECADRON) injection 6 mg  6 mg Intravenous Daily Emmy Abarca APRN       • dextromethorphan polistirex ER (DELSYM) 30 MG/5ML oral suspension 60 mg  60 mg Oral Q12H PRN Emmy Abarca APRN       • enoxaparin (LOVENOX) syringe 40 mg  40 mg Subcutaneous Q24H Emmy Abarca APRN   40 mg at 02/01/22 2121   • guaiFENesin (MUCINEX) 12 hr tablet 1,200 mg  1,200 mg Oral Q12H Raman Miller MD   1,200 mg at 02/02/22 0817   • Magnesium Sulfate 2 gram Bolus, followed by 8 gram infusion (total Mg dose 10 grams)- Mg less than or equal to 1mg/dL  2 g Intravenous PRN Emmy Abarca APRN        Or   • Magnesium Sulfate 2 gram / 50mL Infusion (GIVE X 3 BAGS TO EQUAL 6GM TOTAL DOSE) - Mg 1.1 - 1.5 mg/dl  2 g Intravenous PRN Emmy Abarca APRN        Or   • Magnesium Sulfate 4 gram infusion- Mg 1.6-1.9 mg/dL  4 g Intravenous PRN Emmy Abarca APRN 25 mL/hr at 01/30/22 2009 4 g at 01/30/22 2009   • multivitamin with minerals 1 tablet  1 tablet Oral Daily Emmy Abarca APRN   1 tablet at 02/02/22 0817   • nicotine (NICODERM CQ) 14 MG/24HR patch 1 patch  1 patch  Transdermal Q24H Raman Miller MD   1 patch at 02/02/22 0800   • norepinephrine (LEVOPHED) 8 mg in 250 mL NS infusion (premix)  0.02-0.3 mcg/kg/min Intravenous Titrated Kennedi Cobb APRN   Stopped at 02/02/22 0200   • ondansetron (ZOFRAN) tablet 4 mg  4 mg Oral Q6H PRN Emmy Abarca APRN        Or   • ondansetron (ZOFRAN) injection 4 mg  4 mg Intravenous Q6H PRN Emmy Abarca APRN   4 mg at 02/02/22 1216   • oxyCODONE-acetaminophen (PERCOCET) 5-325 MG per tablet 1 tablet  1 tablet Oral Q6H PRN J Carlos Burger APRN       • Pancrelipase (Lip-Prot-Amyl) (CREON) capsule 36,000 units of lipase  36,000 units of lipase Oral TID With Meals Raman Miller MD   36,000 units of lipase at 02/02/22 1203   • pantoprazole (PROTONIX) EC tablet 40 mg  40 mg Oral Q AM Raman Miller MD   40 mg at 02/02/22 0540   • potassium chloride (MICRO-K) CR capsule 40 mEq  40 mEq Oral PRN Emmy Abarca APRN        Or   • potassium chloride (KLOR-CON) packet 40 mEq  40 mEq Oral PRN Emmy Abarca APRN   40 mEq at 01/31/22 0420    Or   • potassium chloride 10 mEq in 100 mL IVPB  10 mEq Intravenous Q1H PRN Emmy Abarca APRN       • remdesivir 100 mg in sodium chloride 0.9 % 250 mL IVPB (powder vial)  100 mg Intravenous Daily With Lunch Emmy Abarca APRN   100 mg at 02/02/22 1203   • sodium chloride 0.9 % flush 10 mL  10 mL Intravenous Q12H J Carlos Burger APRN   10 mL at 02/02/22 0818   • sodium chloride 0.9 % flush 10 mL  10 mL Intravenous PRN J Carlos Burger APRN       • sodium chloride 0.9 % flush 20 mL  20 mL Intravenous PRN J Carlos Burger APRN       • tiotropium (SPIRIVA RESPIMAT) 2.5 mcg/act aerosol solution inhaler  2 puff Inhalation Daily - RT Case, Rebekah HENSON., DO   2 puff at 02/02/22 0811     Lab Results (last 48 hours)     Procedure Component Value Units Date/Time    Respiratory Culture - Sputum, Cough [[id number]] Collected: 02/01/22 0324    Specimen: Sputum from Cough Updated: 02/02/22 1006      Respiratory Culture Scant growth (1+) The culture consists of normal respiratory ana. This is a preliminary report; final report to follow.     Gram Stain Moderate (3+) WBCs per low power field      Few (2+) Epithelial cells per low power field      Rare (1+) Gram positive cocci      Few (2+) Yeast    Blood Culture - Blood, Arm, Right [[id number]]  (Normal) Collected: 01/30/22 0845    Specimen: Blood from Arm, Right Updated: 02/02/22 0915     Blood Culture No growth at 3 days    Blood Culture - Blood, Arm, Right [[id number]]  (Normal) Collected: 01/30/22 0830    Specimen: Blood from Arm, Right Updated: 02/02/22 0915     Blood Culture No growth at 3 days    T4, Free [[id number]]  (Normal) Collected: 02/02/22 0316    Specimen: Blood Updated: 02/02/22 0538     Free T4 1.33 ng/dL     Narrative:      Results may be falsely increased if patient taking Biotin.      Comprehensive Metabolic Panel [[id number]]  (Abnormal) Collected: 02/02/22 0316    Specimen: Blood Updated: 02/02/22 0527     Glucose 89 mg/dL      BUN 18 mg/dL      Creatinine 0.47 mg/dL      Sodium 139 mmol/L      Potassium 5.3 mmol/L      Comment: Slight hemolysis detected by analyzer. Results may be affected.        Chloride 104 mmol/L      CO2 30.0 mmol/L      Calcium 8.1 mg/dL      Total Protein 5.1 g/dL      Albumin 3.00 g/dL      ALT (SGPT) 24 U/L      AST (SGOT) 31 U/L      Alkaline Phosphatase 80 U/L      Total Bilirubin 0.2 mg/dL      eGFR Non African Amer 135 mL/min/1.73      Globulin 2.1 gm/dL      Comment: Calculated Result        A/G Ratio 1.4 g/dL      BUN/Creatinine Ratio 38.3     Anion Gap 5.0 mmol/L     Narrative:      GFR Normal >60  Chronic Kidney Disease <60  Kidney Failure <15      CBC & Differential [[id number]]  (Abnormal) Collected: 02/02/22 0316    Specimen: Blood Updated: 02/02/22 0511    Narrative:      The following orders were created for panel order CBC & Differential.  Procedure                               Abnormality          Status                     ---------                               -----------         ------                     CBC Auto Differential[[id number]]        Abnormal            Final result                 Please view results for these tests on the individual orders.    CBC Auto Differential [[id number]]  (Abnormal) Collected: 02/02/22 0316    Specimen: Blood Updated: 02/02/22 0511     WBC 6.14 10*3/mm3      RBC 4.34 10*6/mm3      Hemoglobin 13.5 g/dL      Hematocrit 40.3 %      MCV 92.9 fL      MCH 31.1 pg      MCHC 33.5 g/dL      RDW 14.6 %      RDW-SD 50.5 fl      MPV 11.2 fL      Platelets 185 10*3/mm3      Neutrophil % 53.3 %      Lymphocyte % 30.8 %      Monocyte % 14.8 %      Eosinophil % 0.2 %      Basophil % 0.2 %      Immature Grans % 0.7 %      Neutrophils, Absolute 3.28 10*3/mm3      Lymphocytes, Absolute 1.89 10*3/mm3      Monocytes, Absolute 0.91 10*3/mm3      Eosinophils, Absolute 0.01 10*3/mm3      Basophils, Absolute 0.01 10*3/mm3      Immature Grans, Absolute 0.04 10*3/mm3      nRBC 0.0 /100 WBC     TSH [[id number]]  (Abnormal) Collected: 02/01/22 0930    Specimen: Blood Updated: 02/01/22 1233     TSH 0.190 uIU/mL     Narrative:      Due to abnormal TSH results, suggest ordering Free T4.    Cortisol [[id number]] Collected: 02/01/22 0930    Specimen: Blood Updated: 02/01/22 1233     Cortisol 17.53 mcg/dL     Narrative:      Cortisol Reference Ranges:    Cortisol 6AM - 10AM Range: 6.02-18.40 mcg/dl  Cortisol 4PM - 8PM Range: 2.68-10.50 mcg/dl      Results may be falsely increased if patient taking Biotin.      Cortisol [[id number]] Collected: 02/01/22 1003    Specimen: Blood Updated: 02/01/22 1232     Cortisol 20.22 mcg/dL     Narrative:      Cortisol Reference Ranges:    Cortisol 6AM - 10AM Range: 6.02-18.40 mcg/dl  Cortisol 4PM - 8PM Range: 2.68-10.50 mcg/dl      Results may be falsely increased if patient taking Biotin.      MRSA Screen, PCR (Inpatient) - Swab, Nares [[id number]]  (Normal) Collected:  02/01/22 0014    Specimen: Swab from Nares Updated: 02/01/22 0757     MRSA PCR Negative    Narrative:      MRSA Negative    Lactate Dehydrogenase [[id number]]  (Abnormal) Collected: 02/01/22 0318    Specimen: Blood Updated: 02/01/22 0518      U/L     Comprehensive Metabolic Panel [[id number]]  (Abnormal) Collected: 02/01/22 0318    Specimen: Blood Updated: 02/01/22 0518     Glucose 109 mg/dL      BUN 18 mg/dL      Creatinine 0.43 mg/dL      Sodium 137 mmol/L      Potassium 5.2 mmol/L      Comment: Slight hemolysis detected by analyzer. Results may be affected.        Chloride 99 mmol/L      CO2 28.0 mmol/L      Calcium 8.2 mg/dL      Total Protein 5.8 g/dL      Albumin 3.30 g/dL      ALT (SGPT) 28 U/L      AST (SGOT) 42 U/L      Alkaline Phosphatase 95 U/L      Total Bilirubin 0.3 mg/dL      eGFR Non African Amer 149 mL/min/1.73      Globulin 2.5 gm/dL      Comment: Calculated Result        A/G Ratio 1.3 g/dL      BUN/Creatinine Ratio 41.9     Anion Gap 10.0 mmol/L     Narrative:      GFR Normal >60  Chronic Kidney Disease <60  Kidney Failure <15      Bilirubin, Direct [[id number]]  (Normal) Collected: 02/01/22 0318    Specimen: Blood Updated: 02/01/22 0518     Bilirubin, Direct <0.2 mg/dL      Comment: Specimen hemolyzed. Results may be affected.       C-reactive Protein [[id number]]  (Abnormal) Collected: 02/01/22 0318    Specimen: Blood Updated: 02/01/22 0518     C-Reactive Protein 4.74 mg/dL     Procalcitonin [[id number]]  (Normal) Collected: 02/01/22 0318    Specimen: Blood Updated: 02/01/22 0516     Procalcitonin 0.08 ng/mL     Narrative:      As a Marker for Sepsis (Non-Neonates):     1. <0.5 ng/mL represents a low risk of severe sepsis and/or septic shock.  2. >2 ng/mL represents a high risk of severe sepsis and/or septic shock.    As a Marker for Lower Respiratory Tract Infections that require antibiotic therapy:  PCT on Admission     Antibiotic Therapy             6-12 Hrs later  >0.5                      "     Strongly Recommended            >0.25 - <0.5             Recommended  0.1 - 0.25                  Discouraged                       Remeasure/reassess PCT  <0.1                         Strongly Discouraged         Remeasure/reassess PCT      As 28 day mortality risk marker: \"Change in Procalcitonin Result\" (>80% or <=80%) if Day 0 (or Day 1) and Day 4 values are available. Refer to [email or web address]    Change in PCT <=80 %   A decrease of PCT levels below or equal to 80% defines a positive change in PCT test result representing a higher risk for 28-day all-cause mortality of patients diagnosed with severe sepsis or septic shock.    Change in PCT >80 %   A decrease of PCT levels of more than 80% defines a negative change in PCT result representing a lower risk for 28-day all-cause mortality of patients diagnosed with severe sepsis or septic shock.                Ferritin [[id number]]  (Abnormal) Collected: 02/01/22 0318    Specimen: Blood Updated: 02/01/22 0515     Ferritin 456.30 ng/mL     Narrative:      Results may be falsely decreased if patient taking Biotin.      D-dimer, Quantitative [[id number]]  (Abnormal) Collected: 02/01/22 0318    Specimen: Blood Updated: 02/01/22 0459     D-Dimer, Quantitative 2.16 MCGFEU/mL     Narrative:      The D-Dimer assay test is to be used in conjunction with a clinical pretest probability (PTP) assessment model, and has been approved by the FDA to exclude pulmonary embolism (PE) and deep venous thrombosis (DVT) in outpatients suspected of PE or DVT, with a cutoff of 0.5 MCGFEU/mL.          Imaging Results (Last 48 Hours)     ** No results found for the last 48 hours. **          Orders (last 48 hrs)      Start     Ordered    02/02/22 0852  Diet Regular  Diet Effective Now        Comments: Allergic to green peppers, mushrooms    02/02/22 0851    02/02/22 0848  DIET MESSAGE Wednesday Calorie Count: lunch grill cheese, laura soup, fruit, dessert, carrot stix, " coffee, juice, dinner: cheeseburger, side salad, italian, chips, fruit, dessert, milk, coffee, sb +  all meals,  note: pt allergic to green peppers, m...  Once        Comments: Wednesday Calorie Count: lunch grill cheese, laura soup, fruit, dessert, carrot stix, coffee, juice, dinner: cheeseburger, side salad, italian, chips, fruit, dessert, milk, coffee, sb +  all meals,  note: pt allergic to green peppers, mushrooms THANKS    02/02/22 0851    02/02/22 0600  CBC Auto Differential  PROCEDURE ONCE         02/01/22 2204    02/02/22 0600  T4, Free  Morning Draw         02/01/22 2213    02/01/22 2233  Transfer Patient  Once         02/01/22 2233    02/01/22 2226  Ambulate With Asst Device  Every Shift      Comments: Ambulate in room (ask respiratory therapy to add extra oxygen tubing so she can move about and pace back-and-forth)    02/01/22 2226    02/01/22 1338  OT Consult: Eval & Treat  Once         02/01/22 1337    02/01/22 1056  PT Consult: Eval & Treat Functional Mobility Below Baseline  Once         02/01/22 1055    02/01/22 1051  DIET MESSAGE Calorie Count: Tuesday lunch send as late tray if needed: hamburger, tater tots, ketchup, vegetable soup, fruit cup, dessert, milk,dinner: grilled chix sandwich, pot soup, chips, fruit, carrot stix, ranch, dessert ,juice, sb boost+ al...  Once        Comments: Calorie Count: Tuesday lunch send as late tray if needed: hamburger, tater tots, ketchup, vegetable soup, fruit cup, dessert, milk,dinner: grilled chix sandwich, pot soup, chips, fruit, carrot stix, ranch, dessert ,juice, sb boost+ all meals THANKS    02/01/22 1053    02/01/22 0900  nicotine (NICODERM CQ) 14 MG/24HR patch 1 patch  Every 24 Hours Scheduled         01/31/22 2257    02/01/22 0836  TSH  Once         02/01/22 0836    02/01/22 0829  Ok to draw labs from PICC line  Physician Communication Order  Once        Comments: Ok to draw labs from PICC line    02/01/22 0828    02/01/22 0805  cosyntropin (CORTROSYN)  "injection 0.25 mg  Once        \"And\" Linked Group Details    01/31/22 2309 02/01/22 0800  Cortisol  Every 30 Minutes      Comments: Draw Baseline Cortisol Level Prior to Injection.Then Draw Cortisol Level at 30 and 60 Minutes After Injection     \"And\" Linked Group Details    01/31/22 2309 02/01/22 0600  PICC Site Care  Weekly      Comments: Dressing Changes to PICC Site Every 7 Days and As Needed    01/31/22 1020    02/01/22 0600  CBC Auto Differential  PROCEDURE ONCE,   Status:  Canceled         01/31/22 2204 02/01/22 0600  Bilirubin, Direct  PROCEDURE ONCE         01/31/22 2204 02/01/22 0600  Cortisol  Once,   Status:  Canceled         01/31/22 2304 02/01/22 0600  C-reactive Protein  Morning Draw         01/31/22 2315 02/01/22 0600  Lactate Dehydrogenase  Morning Draw         01/31/22 2315 02/01/22 0600  D-dimer, Quantitative  Morning Draw         01/31/22 2315 02/01/22 0600  Procalcitonin  Morning Draw         01/31/22 2317 02/01/22 0015  pantoprazole (PROTONIX) EC tablet 40 mg  Every Early Morning         01/31/22 2316    01/31/22 2321  Tobacco Cessation Education  Once         01/31/22 2320 01/31/22 2320  Up In Chair  Every Shift       01/31/22 2319 01/31/22 2315  Daily Weights  Daily       01/31/22 2314 01/31/22 2315  NIPPV (CPAP or BIPAP)  As Needed-RT         01/31/22 2315 01/31/22 2314  Strict Intake & Output  Every Shift       01/31/22 2314 01/31/22 2312  Inpatient Nutrition Consult  Once        Provider:  (Not yet assigned)   \"And\" Linked Group Details    01/31/22 2314 01/31/22 2312  Calorie Count  Until Discontinued        \"And\" Linked Group Details    01/31/22 2314 01/31/22 2305  Respiratory Culture - Sputum, Cough  Once         01/31/22 2307 01/31/22 2259  MRSA Screen, PCR (Inpatient) - Swab, Nares  Once         01/31/22 2258 01/31/22 1800  Dietary Nutrition Supplements Boost Plus; strawberry  Daily With Breakfast, Lunch & Dinner       01/31/22 1700 " "   01/31/22 1659  DIET MESSAGE Tuesday lunch -grill cheese, chix noodle soup, fruit cup, dessert, juice, milk, dinner -#2, juice, milk, sb boost+ all meals, THANKS  Once        Comments: Tuesday lunch -grill cheese, chix noodle soup, fruit cup, dessert, juice, milk, dinner -#2, juice, milk, sb boost+ all meals, THANKS    01/31/22 1700    01/31/22 1658  DIET MESSAGE Monday dinner: send as late tray if needed:  salad, baked potato, italian dressing x2, fruit cup, dessert, milk, sb boost THANKS  Once,   Status:  Canceled        Comments: Monday dinner: send as late tray if needed:  salad, baked potato, italian dressing x2, fruit cup, dessert, milk, sb boost THANKS    01/31/22 1658    01/31/22 1400  remdesivir 100 mg in sodium chloride 0.9 % 250 mL IVPB (powder vial)  Daily With Lunch        \"Followed by\" Linked Group Details    01/30/22 1421    01/31/22 1245  guaiFENesin (MUCINEX) 12 hr tablet 1,200 mg  Every 12 Hours Scheduled         01/31/22 1149    01/31/22 1230  albuterol sulfate HFA (PROVENTIL HFA;VENTOLIN HFA;PROAIR HFA) inhaler 2 puff  4 Times Daily - RT         01/31/22 1026    01/31/22 1230  Oscillating Positive Expiratory Pressure (OPEP)  4 Times Daily - RT       01/31/22 1149    01/31/22 1200  AZITHROMYCIN 500 MG/250 ML 0.9% NS IVPB (vial-mate)  Every 24 Hours         01/30/22 1421    01/31/22 1200  Dietary Nutrition Supplements Boost Plus  Daily With Breakfast, Lunch & Dinner,   Status:  Canceled       01/31/22 1030    01/31/22 1200  Pancrelipase (Lip-Prot-Amyl) (CREON) capsule 36,000 units of lipase  3 Times Daily With Meals         01/31/22 1033    01/31/22 1149  Incentive Spirometry  Every Hour While Awake       01/31/22 1149    01/31/22 1115  sodium chloride 0.9 % flush 10 mL  Every 12 Hours Scheduled         01/31/22 1020    01/31/22 1026  albuterol sulfate HFA (PROVENTIL HFA;VENTOLIN HFA;PROAIR HFA) inhaler 2 puff  Every 4 Hours PRN         01/31/22 1026    01/31/22 1013  sodium chloride 0.9 " "% flush 20 mL  As Needed         01/31/22 1020    01/31/22 1013  sodium chloride 0.9 % flush 10 mL  As Needed         01/31/22 1020    01/31/22 0900  dexamethasone (DECADRON) tablet 6 mg  Daily        \"Or\" Linked Group Details    01/30/22 1416    01/31/22 0900  dexamethasone (DECADRON) injection 6 mg  Daily        \"Or\" Linked Group Details    01/30/22 1416    01/31/22 0900  cefTRIAXone (ROCEPHIN) 1 g/100 mL 0.9% NS (MBP)  Every 24 Hours         01/30/22 1421    01/31/22 0600  CBC & Differential  Daily       01/30/22 1436    01/31/22 0600  Comprehensive Metabolic Panel  Daily       01/30/22 1436    01/31/22 0600  Hepatic Function Panel  Daily,   Status:  Canceled       01/30/22 1436    01/31/22 0600  Document Pulse Oximetry - On Room Air / Home O2 Level  Daily,   Status:  Canceled      Comments: Reapply Oxygen if O2 Sat Drops Below 88%    01/30/22 1644    01/31/22 0600  CBC & Differential  Daily,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

## 2022-02-03 ENCOUNTER — APPOINTMENT (OUTPATIENT)
Dept: GENERAL RADIOLOGY | Facility: HOSPITAL | Age: 62
End: 2022-02-03

## 2022-02-03 LAB
ALBUMIN SERPL-MCNC: 3 G/DL (ref 3.5–5.2)
ALBUMIN/GLOB SERPL: 1.3 G/DL
ALP SERPL-CCNC: 84 U/L (ref 39–117)
ALT SERPL W P-5'-P-CCNC: 21 U/L (ref 1–33)
ANION GAP SERPL CALCULATED.3IONS-SCNC: 9 MMOL/L (ref 5–15)
AST SERPL-CCNC: 28 U/L (ref 1–32)
BACTERIA SPEC RESP CULT: NORMAL
BASOPHILS # BLD AUTO: 0.02 10*3/MM3 (ref 0–0.2)
BASOPHILS NFR BLD AUTO: 0.3 % (ref 0–1.5)
BILIRUB SERPL-MCNC: 0.3 MG/DL (ref 0–1.2)
BUN SERPL-MCNC: 18 MG/DL (ref 8–23)
BUN/CREAT SERPL: 39.1 (ref 7–25)
CALCIUM SPEC-SCNC: 8.5 MG/DL (ref 8.6–10.5)
CHLORIDE SERPL-SCNC: 100 MMOL/L (ref 98–107)
CO2 SERPL-SCNC: 26 MMOL/L (ref 22–29)
CREAT SERPL-MCNC: 0.46 MG/DL (ref 0.57–1)
DEPRECATED RDW RBC AUTO: 52 FL (ref 37–54)
EOSINOPHIL # BLD AUTO: 0.03 10*3/MM3 (ref 0–0.4)
EOSINOPHIL NFR BLD AUTO: 0.5 % (ref 0.3–6.2)
ERYTHROCYTE [DISTWIDTH] IN BLOOD BY AUTOMATED COUNT: 14.9 % (ref 12.3–15.4)
GFR SERPL CREATININE-BSD FRML MDRD: 138 ML/MIN/1.73
GLOBULIN UR ELPH-MCNC: 2.4 GM/DL
GLUCOSE SERPL-MCNC: 107 MG/DL (ref 65–99)
GRAM STN SPEC: NORMAL
HCT VFR BLD AUTO: 42.3 % (ref 34–46.6)
HGB BLD-MCNC: 13.9 G/DL (ref 12–15.9)
IMM GRANULOCYTES # BLD AUTO: 0.09 10*3/MM3 (ref 0–0.05)
IMM GRANULOCYTES NFR BLD AUTO: 1.4 % (ref 0–0.5)
LYMPHOCYTES # BLD AUTO: 1.93 10*3/MM3 (ref 0.7–3.1)
LYMPHOCYTES NFR BLD AUTO: 29.4 % (ref 19.6–45.3)
MCH RBC QN AUTO: 31.1 PG (ref 26.6–33)
MCHC RBC AUTO-ENTMCNC: 32.9 G/DL (ref 31.5–35.7)
MCV RBC AUTO: 94.6 FL (ref 79–97)
MONOCYTES # BLD AUTO: 1.03 10*3/MM3 (ref 0.1–0.9)
MONOCYTES NFR BLD AUTO: 15.7 % (ref 5–12)
NEUTROPHILS NFR BLD AUTO: 3.47 10*3/MM3 (ref 1.7–7)
NEUTROPHILS NFR BLD AUTO: 52.7 % (ref 42.7–76)
NRBC BLD AUTO-RTO: 0 /100 WBC (ref 0–0.2)
PLATELET # BLD AUTO: 183 10*3/MM3 (ref 140–450)
PMV BLD AUTO: 11.4 FL (ref 6–12)
POTASSIUM SERPL-SCNC: 5 MMOL/L (ref 3.5–5.2)
PROT SERPL-MCNC: 5.4 G/DL (ref 6–8.5)
RBC # BLD AUTO: 4.47 10*6/MM3 (ref 3.77–5.28)
SODIUM SERPL-SCNC: 135 MMOL/L (ref 136–145)
WBC NRBC COR # BLD: 6.57 10*3/MM3 (ref 3.4–10.8)

## 2022-02-03 PROCEDURE — 80053 COMPREHEN METABOLIC PANEL: CPT | Performed by: INTERNAL MEDICINE

## 2022-02-03 PROCEDURE — 25010000002 ENOXAPARIN PER 10 MG: Performed by: NURSE PRACTITIONER

## 2022-02-03 PROCEDURE — 63710000001 DEXAMETHASONE PER 0.25 MG: Performed by: NURSE PRACTITIONER

## 2022-02-03 PROCEDURE — 25010000002 ONDANSETRON PER 1 MG: Performed by: NURSE PRACTITIONER

## 2022-02-03 PROCEDURE — 94799 UNLISTED PULMONARY SVC/PX: CPT

## 2022-02-03 PROCEDURE — 71045 X-RAY EXAM CHEST 1 VIEW: CPT

## 2022-02-03 PROCEDURE — 99232 SBSQ HOSP IP/OBS MODERATE 35: CPT | Performed by: INTERNAL MEDICINE

## 2022-02-03 PROCEDURE — 25010000002 CEFTRIAXONE PER 250 MG: Performed by: INTERNAL MEDICINE

## 2022-02-03 PROCEDURE — 25010000002 REMDESIVIR 100 MG/20ML SOLUTION 1 EACH VIAL: Performed by: NURSE PRACTITIONER

## 2022-02-03 PROCEDURE — 85025 COMPLETE CBC W/AUTO DIFF WBC: CPT | Performed by: INTERNAL MEDICINE

## 2022-02-03 PROCEDURE — 25010000002 AZITHROMYCIN PER 500 MG: Performed by: INTERNAL MEDICINE

## 2022-02-03 RX ADMIN — PANTOPRAZOLE SODIUM 40 MG: 40 TABLET, DELAYED RELEASE ORAL at 05:15

## 2022-02-03 RX ADMIN — GUAIFENESIN 1200 MG: 600 TABLET, EXTENDED RELEASE ORAL at 08:15

## 2022-02-03 RX ADMIN — GUAIFENESIN 1200 MG: 600 TABLET, EXTENDED RELEASE ORAL at 21:30

## 2022-02-03 RX ADMIN — REMDESIVIR 100 MG: 100 INJECTION, POWDER, LYOPHILIZED, FOR SOLUTION INTRAVENOUS at 11:37

## 2022-02-03 RX ADMIN — ALBUTEROL SULFATE 2 PUFF: 90 AEROSOL, METERED RESPIRATORY (INHALATION) at 08:30

## 2022-02-03 RX ADMIN — ENOXAPARIN SODIUM 40 MG: 40 INJECTION SUBCUTANEOUS at 21:29

## 2022-02-03 RX ADMIN — SENNOSIDES AND DOCUSATE SODIUM 2 TABLET: 50; 8.6 TABLET ORAL at 21:29

## 2022-02-03 RX ADMIN — OXYCODONE HYDROCHLORIDE AND ACETAMINOPHEN 1 TABLET: 5; 325 TABLET ORAL at 21:29

## 2022-02-03 RX ADMIN — PANCRELIPASE 36000 UNITS OF LIPASE: 36000; 180000; 114000 CAPSULE, DELAYED RELEASE PELLETS ORAL at 11:36

## 2022-02-03 RX ADMIN — SODIUM CHLORIDE 1 G: 900 INJECTION INTRAVENOUS at 08:15

## 2022-02-03 RX ADMIN — NICOTINE 1 PATCH: 14 PATCH, EXTENDED RELEASE TRANSDERMAL at 08:15

## 2022-02-03 RX ADMIN — ALBUTEROL SULFATE 2 PUFF: 90 AEROSOL, METERED RESPIRATORY (INHALATION) at 20:27

## 2022-02-03 RX ADMIN — SODIUM CHLORIDE, PRESERVATIVE FREE 10 ML: 5 INJECTION INTRAVENOUS at 21:30

## 2022-02-03 RX ADMIN — PANCRELIPASE 36000 UNITS OF LIPASE: 36000; 180000; 114000 CAPSULE, DELAYED RELEASE PELLETS ORAL at 18:21

## 2022-02-03 RX ADMIN — SODIUM CHLORIDE, PRESERVATIVE FREE 10 ML: 5 INJECTION INTRAVENOUS at 09:48

## 2022-02-03 RX ADMIN — TIOTROPIUM BROMIDE INHALATION SPRAY 2 PUFF: 3.12 SPRAY, METERED RESPIRATORY (INHALATION) at 08:30

## 2022-02-03 RX ADMIN — ONDANSETRON 4 MG: 2 INJECTION INTRAMUSCULAR; INTRAVENOUS at 18:27

## 2022-02-03 RX ADMIN — ALBUTEROL SULFATE 2 PUFF: 90 AEROSOL, METERED RESPIRATORY (INHALATION) at 13:59

## 2022-02-03 RX ADMIN — AZITHROMYCIN MONOHYDRATE 500 MG: 500 INJECTION, POWDER, LYOPHILIZED, FOR SOLUTION INTRAVENOUS at 11:36

## 2022-02-03 RX ADMIN — Medication 1 TABLET: at 08:15

## 2022-02-03 RX ADMIN — PANCRELIPASE 36000 UNITS OF LIPASE: 36000; 180000; 114000 CAPSULE, DELAYED RELEASE PELLETS ORAL at 08:15

## 2022-02-03 RX ADMIN — DEXAMETHASONE 6 MG: 2 TABLET ORAL at 08:15

## 2022-02-03 NOTE — PLAN OF CARE
Goal Outcome Evaluation:  Plan of Care Reviewed With: patient        Progress: improving  Outcome Summary: Pt up ad adilene to BSC.  VSS.  Congested cough with mild expiratory wheezes.  Currently opn 5 liters NC.

## 2022-02-03 NOTE — PLAN OF CARE
Goal Outcome Evaluation:   Pt.had an overall good day. She ate the majority of every meal asking for snacks in between. She is remaining on 4LNC and vital signs remain stable.

## 2022-02-03 NOTE — CONSULTS
Referring Provider: Case, DO  Reason for Consultation: AoCRF    Subjective .   Education:NN spoke with pt via telephone.  Pt alert and able to answer questions appropriately.  Patient reports that she rarely used oxygen at home and believes her prescription was likely . Pt reports the ability to ambulate without issue at baseline before experiencing SOB.  She is not prescribed a rescue medication at home. Patient is not up to date on COVID, flu and PNA vaccines.  She is a current smoker but states that she would like to quit.  She was given the number to the [facility] QUIT NOW hotline for a resource.  Further discussion on nicotine cessation to occur throughout admission. Pt reports no issues at this time with medications or transportation for appointments.  Pt reports no previous hx of formal COPD teaching, no understanding of action plan, or IN.  Home O2 safety reviewed.  COPD education began in the form of explanation, handouts, and videos.  No new concerns or questions voiced at this time.  NN will continue to follow as needed.      Age: 61 y.o.  Sex: female  Smoker Status: current,   Pulmonologist: KAM  FEV1 (PFT): NA  Home O2: PRN    Objective     SpO2 SpO2: 97 % (22)  Device Device (Oxygen Therapy): humidified, nasal cannula (22)  Flow Flow (L/min): 4 (22)  Incentive Spirometer $ Incentive Spirometry: yes (22)  IS Predicted Level (mL) Incentive Spirometer Predicted Level (mL): 1000 (22)   Number of Repetitions Number of Repetitions (IS): 6 (22)   Level Incentive Spirometer (mL) Level Incentive Spirometer (mL): 1300 (22)  Patient Tolerance Patient Tolerance (IS): fair (22)   Inhaler Treatment Status Respiratory Treatment Status (Inhaler): given (22)  Treatment Route Respiratory Treatment Status (Inhaler): given (22)      Home Medications:  Medications Prior to Admission   Medication Sig Dispense  Refill Last Dose    doxycycline (MONODOX) 100 MG capsule Take 1 capsule by mouth 2 (Two) Times a Day. 20 capsule 0 1/29/2022 at Unknown time    ondansetron ODT (ZOFRAN-ODT) 4 MG disintegrating tablet Take 1 tablet by mouth Every 8 (Eight) Hours As Needed for Nausea or Vomiting. 10 tablet 0 1/29/2022 at Unknown time    oxyCODONE-acetaminophen (PERCOCET)  MG per tablet Take 1 tablet by mouth Every 6 (Six) Hours As Needed for Moderate Pain .   1/29/2022 at Unknown time    predniSONE (DELTASONE) 20 MG tablet 3 tablets once daily for 2 days, 2 tablets once daily for 2 days, 1 tablet once daily for 2 days 12 tablet 0 1/29/2022 at Unknown time    promethazine (PHENERGAN) 25 MG tablet Take 25 mg by mouth Every 6 (Six) Hours As Needed for Nausea or Vomiting.   Past Month at Unknown time       Discussion: Per current GOLD Standards, please consider: NRT at MO, No rescue in place, No LAMA/LABA/ICS in place, Outpatient PFT, Rehab as appropriate    Patient would like to follow with [facility] after discharge.  NN will arrange a follow up appointment with pulmonary office prior to discharge.    Tigist Umaña RN
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
## 2022-02-04 LAB
ANION GAP SERPL CALCULATED.3IONS-SCNC: 7 MMOL/L (ref 5–15)
ARTERIAL PATENCY WRIST A: ABNORMAL
ATMOSPHERIC PRESS: ABNORMAL MM[HG]
BACTERIA SPEC AEROBE CULT: NORMAL
BACTERIA SPEC AEROBE CULT: NORMAL
BASE EXCESS BLDA CALC-SCNC: 8.5 MMOL/L (ref 0–2)
BASOPHILS # BLD AUTO: 0.02 10*3/MM3 (ref 0–0.2)
BASOPHILS NFR BLD AUTO: 0.3 % (ref 0–1.5)
BDY SITE: ABNORMAL
BODY TEMPERATURE: 37 C
BUN SERPL-MCNC: 18 MG/DL (ref 8–23)
BUN/CREAT SERPL: 45 (ref 7–25)
CALCIUM SPEC-SCNC: 8.3 MG/DL (ref 8.6–10.5)
CHLORIDE SERPL-SCNC: 101 MMOL/L (ref 98–107)
CO2 BLDA-SCNC: 35.5 MMOL/L (ref 22–33)
CO2 SERPL-SCNC: 30 MMOL/L (ref 22–29)
COHGB MFR BLD: 0.8 % (ref 0–2)
CREAT SERPL-MCNC: 0.4 MG/DL (ref 0.57–1)
DEPRECATED RDW RBC AUTO: 50.5 FL (ref 37–54)
EOSINOPHIL # BLD AUTO: 0.03 10*3/MM3 (ref 0–0.4)
EOSINOPHIL NFR BLD AUTO: 0.4 % (ref 0.3–6.2)
EPAP: 0
ERYTHROCYTE [DISTWIDTH] IN BLOOD BY AUTOMATED COUNT: 14.6 % (ref 12.3–15.4)
GFR SERPL CREATININE-BSD FRML MDRD: >150 ML/MIN/1.73
GLUCOSE SERPL-MCNC: 109 MG/DL (ref 65–99)
HCO3 BLDA-SCNC: 34 MMOL/L (ref 20–26)
HCT VFR BLD AUTO: 40.6 % (ref 34–46.6)
HCT VFR BLD CALC: 40.9 % (ref 38–51)
HGB BLD-MCNC: 13.4 G/DL (ref 12–15.9)
HGB BLDA-MCNC: 13.4 G/DL (ref 14–18)
IMM GRANULOCYTES # BLD AUTO: 0.09 10*3/MM3 (ref 0–0.05)
IMM GRANULOCYTES NFR BLD AUTO: 1.1 % (ref 0–0.5)
INHALED O2 CONCENTRATION: 28 %
IPAP: 0
LYMPHOCYTES # BLD AUTO: 2.28 10*3/MM3 (ref 0.7–3.1)
LYMPHOCYTES NFR BLD AUTO: 28.8 % (ref 19.6–45.3)
MCH RBC QN AUTO: 30.7 PG (ref 26.6–33)
MCHC RBC AUTO-ENTMCNC: 33 G/DL (ref 31.5–35.7)
MCV RBC AUTO: 93.1 FL (ref 79–97)
METHGB BLD QL: 0.4 % (ref 0–1.5)
MODALITY: ABNORMAL
MONOCYTES # BLD AUTO: 1.02 10*3/MM3 (ref 0.1–0.9)
MONOCYTES NFR BLD AUTO: 12.9 % (ref 5–12)
NEUTROPHILS NFR BLD AUTO: 4.47 10*3/MM3 (ref 1.7–7)
NEUTROPHILS NFR BLD AUTO: 56.5 % (ref 42.7–76)
NOTE: ABNORMAL
NRBC BLD AUTO-RTO: 0 /100 WBC (ref 0–0.2)
OXYHGB MFR BLDV: 93.1 % (ref 94–99)
PAW @ PEAK INSP FLOW SETTING VENT: 0 CMH2O
PCO2 BLDA: 49.2 MM HG (ref 35–45)
PCO2 TEMP ADJ BLD: 49.2 MM HG (ref 35–45)
PH BLDA: 7.45 PH UNITS (ref 7.35–7.45)
PH, TEMP CORRECTED: 7.45 PH UNITS
PLATELET # BLD AUTO: 239 10*3/MM3 (ref 140–450)
PMV BLD AUTO: 11.2 FL (ref 6–12)
PO2 BLDA: 66.8 MM HG (ref 83–108)
PO2 TEMP ADJ BLD: 66.8 MM HG (ref 83–108)
POTASSIUM SERPL-SCNC: 4.5 MMOL/L (ref 3.5–5.2)
RBC # BLD AUTO: 4.36 10*6/MM3 (ref 3.77–5.28)
SODIUM SERPL-SCNC: 138 MMOL/L (ref 136–145)
TOTAL RATE: 0 BREATHS/MINUTE
WBC NRBC COR # BLD: 7.91 10*3/MM3 (ref 3.4–10.8)

## 2022-02-04 PROCEDURE — 25010000002 AZITHROMYCIN PER 500 MG: Performed by: INTERNAL MEDICINE

## 2022-02-04 PROCEDURE — 25010000002 CEFTRIAXONE PER 250 MG: Performed by: INTERNAL MEDICINE

## 2022-02-04 PROCEDURE — 36600 WITHDRAWAL OF ARTERIAL BLOOD: CPT

## 2022-02-04 PROCEDURE — 94761 N-INVAS EAR/PLS OXIMETRY MLT: CPT

## 2022-02-04 PROCEDURE — 97530 THERAPEUTIC ACTIVITIES: CPT

## 2022-02-04 PROCEDURE — 25010000002 ENOXAPARIN PER 10 MG: Performed by: NURSE PRACTITIONER

## 2022-02-04 PROCEDURE — 82805 BLOOD GASES W/O2 SATURATION: CPT

## 2022-02-04 PROCEDURE — 82375 ASSAY CARBOXYHB QUANT: CPT

## 2022-02-04 PROCEDURE — 94799 UNLISTED PULMONARY SVC/PX: CPT

## 2022-02-04 PROCEDURE — 83050 HGB METHEMOGLOBIN QUAN: CPT

## 2022-02-04 PROCEDURE — 97110 THERAPEUTIC EXERCISES: CPT

## 2022-02-04 PROCEDURE — 99232 SBSQ HOSP IP/OBS MODERATE 35: CPT | Performed by: INTERNAL MEDICINE

## 2022-02-04 PROCEDURE — 85025 COMPLETE CBC W/AUTO DIFF WBC: CPT | Performed by: INTERNAL MEDICINE

## 2022-02-04 PROCEDURE — 80048 BASIC METABOLIC PNL TOTAL CA: CPT | Performed by: INTERNAL MEDICINE

## 2022-02-04 PROCEDURE — 63710000001 DEXAMETHASONE PER 0.25 MG: Performed by: NURSE PRACTITIONER

## 2022-02-04 RX ORDER — CEFUROXIME AXETIL 250 MG/1
500 TABLET ORAL EVERY 12 HOURS SCHEDULED
Status: DISCONTINUED | OUTPATIENT
Start: 2022-02-05 | End: 2022-02-07 | Stop reason: HOSPADM

## 2022-02-04 RX ADMIN — PANTOPRAZOLE SODIUM 40 MG: 40 TABLET, DELAYED RELEASE ORAL at 06:48

## 2022-02-04 RX ADMIN — PANCRELIPASE 36000 UNITS OF LIPASE: 36000; 180000; 114000 CAPSULE, DELAYED RELEASE PELLETS ORAL at 11:30

## 2022-02-04 RX ADMIN — ALBUTEROL SULFATE 2 PUFF: 90 AEROSOL, METERED RESPIRATORY (INHALATION) at 15:47

## 2022-02-04 RX ADMIN — PANCRELIPASE 36000 UNITS OF LIPASE: 36000; 180000; 114000 CAPSULE, DELAYED RELEASE PELLETS ORAL at 18:16

## 2022-02-04 RX ADMIN — Medication 1 TABLET: at 09:18

## 2022-02-04 RX ADMIN — SENNOSIDES AND DOCUSATE SODIUM 2 TABLET: 50; 8.6 TABLET ORAL at 09:18

## 2022-02-04 RX ADMIN — DEXAMETHASONE 6 MG: 2 TABLET ORAL at 09:18

## 2022-02-04 RX ADMIN — AZITHROMYCIN MONOHYDRATE 500 MG: 500 INJECTION, POWDER, LYOPHILIZED, FOR SOLUTION INTRAVENOUS at 11:30

## 2022-02-04 RX ADMIN — SODIUM CHLORIDE, PRESERVATIVE FREE 10 ML: 5 INJECTION INTRAVENOUS at 21:15

## 2022-02-04 RX ADMIN — ALBUTEROL SULFATE 2 PUFF: 90 AEROSOL, METERED RESPIRATORY (INHALATION) at 19:18

## 2022-02-04 RX ADMIN — ENOXAPARIN SODIUM 40 MG: 40 INJECTION SUBCUTANEOUS at 21:15

## 2022-02-04 RX ADMIN — TIOTROPIUM BROMIDE INHALATION SPRAY 2 PUFF: 3.12 SPRAY, METERED RESPIRATORY (INHALATION) at 07:25

## 2022-02-04 RX ADMIN — SODIUM CHLORIDE 1 G: 900 INJECTION INTRAVENOUS at 09:18

## 2022-02-04 RX ADMIN — ALBUTEROL SULFATE 2 PUFF: 90 AEROSOL, METERED RESPIRATORY (INHALATION) at 07:24

## 2022-02-04 RX ADMIN — ALBUTEROL SULFATE 2 PUFF: 90 AEROSOL, METERED RESPIRATORY (INHALATION) at 11:37

## 2022-02-04 RX ADMIN — GUAIFENESIN 1200 MG: 600 TABLET, EXTENDED RELEASE ORAL at 21:13

## 2022-02-04 RX ADMIN — GUAIFENESIN 1200 MG: 600 TABLET, EXTENDED RELEASE ORAL at 09:18

## 2022-02-04 RX ADMIN — PANCRELIPASE 36000 UNITS OF LIPASE: 36000; 180000; 114000 CAPSULE, DELAYED RELEASE PELLETS ORAL at 09:18

## 2022-02-04 RX ADMIN — SODIUM CHLORIDE, PRESERVATIVE FREE 10 ML: 5 INJECTION INTRAVENOUS at 09:19

## 2022-02-04 RX ADMIN — SENNOSIDES AND DOCUSATE SODIUM 2 TABLET: 50; 8.6 TABLET ORAL at 21:13

## 2022-02-04 NOTE — PLAN OF CARE
Goal Outcome Evaluation:           Progress: improving  Outcome Summary: VS stable on 4L NC. Continues to have congested cough. Pt up ad adilene at bedside. No complaints other than chronic abdominal pain (improved with prn percocet). Pt rested well.

## 2022-02-04 NOTE — PROGRESS NOTES
Daily chart review complete.  Two attempts have been made to lyndon the patient on both her cell phone and room phone with no success.  Patient has been scheduled to follow up in pulmonary clinic after discharge.  No other questions or concerns voiced at this time.  NN continues to follow.      Per current GOLD Standards, please consider: NRT at DC, No rescue in place, No LAMA/LABA/ICS in place, Outpatient PFT, Rehab as appropriate        Patient has been scheduled for a hospital follow up and to establish care with [facility] for 3/ 2/2022 @9:30 am with  RAFA Castaneda.
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
## 2022-02-05 ENCOUNTER — APPOINTMENT (OUTPATIENT)
Dept: GENERAL RADIOLOGY | Facility: HOSPITAL | Age: 62
End: 2022-02-05

## 2022-02-05 PROCEDURE — 94799 UNLISTED PULMONARY SVC/PX: CPT

## 2022-02-05 PROCEDURE — 94761 N-INVAS EAR/PLS OXIMETRY MLT: CPT

## 2022-02-05 PROCEDURE — 92526 ORAL FUNCTION THERAPY: CPT | Performed by: SPEECH-LANGUAGE PATHOLOGIST

## 2022-02-05 PROCEDURE — 25010000002 ENOXAPARIN PER 10 MG: Performed by: INTERNAL MEDICINE

## 2022-02-05 PROCEDURE — 71046 X-RAY EXAM CHEST 2 VIEWS: CPT

## 2022-02-05 PROCEDURE — 99232 SBSQ HOSP IP/OBS MODERATE 35: CPT | Performed by: INTERNAL MEDICINE

## 2022-02-05 PROCEDURE — 63710000001 DEXAMETHASONE PER 0.25 MG: Performed by: NURSE PRACTITIONER

## 2022-02-05 RX ADMIN — ALBUTEROL SULFATE 2 PUFF: 90 AEROSOL, METERED RESPIRATORY (INHALATION) at 12:19

## 2022-02-05 RX ADMIN — SODIUM CHLORIDE, PRESERVATIVE FREE 10 ML: 5 INJECTION INTRAVENOUS at 20:50

## 2022-02-05 RX ADMIN — PANCRELIPASE 36000 UNITS OF LIPASE: 36000; 180000; 114000 CAPSULE, DELAYED RELEASE PELLETS ORAL at 08:59

## 2022-02-05 RX ADMIN — SENNOSIDES AND DOCUSATE SODIUM 2 TABLET: 50; 8.6 TABLET ORAL at 20:50

## 2022-02-05 RX ADMIN — BENZONATATE 100 MG: 100 CAPSULE ORAL at 20:50

## 2022-02-05 RX ADMIN — Medication 1 TABLET: at 09:00

## 2022-02-05 RX ADMIN — GUAIFENESIN 1200 MG: 600 TABLET, EXTENDED RELEASE ORAL at 20:50

## 2022-02-05 RX ADMIN — CEFUROXIME AXETIL 500 MG: 250 TABLET ORAL at 20:50

## 2022-02-05 RX ADMIN — OXYCODONE HYDROCHLORIDE AND ACETAMINOPHEN 1 TABLET: 5; 325 TABLET ORAL at 20:50

## 2022-02-05 RX ADMIN — PANCRELIPASE 36000 UNITS OF LIPASE: 36000; 180000; 114000 CAPSULE, DELAYED RELEASE PELLETS ORAL at 17:12

## 2022-02-05 RX ADMIN — DEXAMETHASONE 6 MG: 2 TABLET ORAL at 09:00

## 2022-02-05 RX ADMIN — SENNOSIDES AND DOCUSATE SODIUM 2 TABLET: 50; 8.6 TABLET ORAL at 09:00

## 2022-02-05 RX ADMIN — SODIUM CHLORIDE, PRESERVATIVE FREE 10 ML: 5 INJECTION INTRAVENOUS at 09:01

## 2022-02-05 RX ADMIN — ALBUTEROL SULFATE 2 PUFF: 90 AEROSOL, METERED RESPIRATORY (INHALATION) at 08:09

## 2022-02-05 RX ADMIN — ALBUTEROL SULFATE 2 PUFF: 90 AEROSOL, METERED RESPIRATORY (INHALATION) at 19:24

## 2022-02-05 RX ADMIN — ALBUTEROL SULFATE 2 PUFF: 90 AEROSOL, METERED RESPIRATORY (INHALATION) at 15:33

## 2022-02-05 RX ADMIN — GUAIFENESIN 1200 MG: 600 TABLET, EXTENDED RELEASE ORAL at 09:00

## 2022-02-05 RX ADMIN — PANCRELIPASE 36000 UNITS OF LIPASE: 36000; 180000; 114000 CAPSULE, DELAYED RELEASE PELLETS ORAL at 11:12

## 2022-02-05 RX ADMIN — ENOXAPARIN SODIUM 40 MG: 40 INJECTION SUBCUTANEOUS at 20:50

## 2022-02-05 RX ADMIN — CEFUROXIME AXETIL 500 MG: 250 TABLET ORAL at 09:00

## 2022-02-05 RX ADMIN — PANTOPRAZOLE SODIUM 40 MG: 40 TABLET, DELAYED RELEASE ORAL at 05:07

## 2022-02-05 RX ADMIN — TIOTROPIUM BROMIDE INHALATION SPRAY 2 PUFF: 3.12 SPRAY, METERED RESPIRATORY (INHALATION) at 08:10

## 2022-02-05 NOTE — PROGRESS NOTES
Daily chart review complete.  Attempt to speak with patient via telephone unsuccessful.  COPD educational materials given to RN.  No other questions or concerns at this time.  NN continues to follow.        Per current GOLD Standards, please consider: NRT at DC, No rescue in place, No LAMA/LABA/ICS in place, Outpatient PFT, Rehab as appropriate           Patient has been scheduled for a hospital follow up and to establish care with [facility] for 3/ 2/2022 @9:30 am with  RAFA Castaneda.

## 2022-02-05 NOTE — THERAPY TREATMENT NOTE
Acute Care - Speech Language Pathology   Swallow Treatment Note [facility]     Patient Name: Mary West  : 1960  MRN: [id number]  Today's Date: 2022               Admit Date: 2022    Visit Dx:     ICD-10-CM ICD-9-CM   1. Acute respiratory failure due to COVID-19 (HCC)  U07.1 518.81    J96.00 079.89   2. Pneumonia of right middle lobe due to infectious organism  J18.9 486   3. History of COPD  Z87.09 V12.69   4. Dysphagia, unspecified type  R13.10 787.20     Patient Active Problem List   Diagnosis   • COVID-19   • Severe centrilobular emphysema recently on home oxygen  (HCC)   • Acute on chronic respiratory failure (HCC)   • Acute respiratory failure due to COVID-19 (HCC)   • Remote history alcohol abuse   • Tobacco dependence   • Suspected community acquired pneumonia of RML and RLL of lung   • Chronic Pancreatitis   • Chronic narcotic use     Past Medical History:   Diagnosis Date   • Alcohol abuse     Quit early    • COPD (chronic obstructive pulmonary disease) ([facility])    • Pancreatitis      History reviewed. No pertinent surgical history.    SLP Recommendation and Plan  SLP Swallowing Diagnosis: functional oral phase, functional pharyngeal phase (22 123)  SLP Diet Recommendation: regular textures, thin liquids (22 1230)  Recommended Precautions and Strategies: upright posture during/after eating, small bites of food and sips of liquid, general aspiration precautions (22 1230)  SLP Rec. for Method of Medication Administration: meds whole, with thin liquids, as tolerated (22 1230)     Monitor for Signs of Aspiration: yes, notify SLP if any concerns (22 1230)  Recommended Diagnostics: No further SLP services recommended (22 1230)  Swallow Criteria for Skilled Therapeutic Interventions Met: no problems identified which require skilled intervention (22 1230)  Anticipated Discharge Disposition (SLP): unknown, anticipate therapy at next level of care  (02/05/22 1230)  Rehab Potential/Prognosis, Swallowing: good, to achieve stated therapy goals (02/05/22 1230)     Predicted Duration Therapy Intervention (Days): 3 days (02/05/22 1230)     Daily Summary of Progress (SLP): progress toward functional goals is good (02/05/22 1230)               Treatment Assessment (SLP): Pt seen for diet tolerance this pm. Pt accepted multiple trials of solids and thin liquids w/o overt s/s of aspiration even when pushed w/ consecutive sips. Will continue regular diet w/ thin liquids and SLP will sign off at this time (02/05/22 1230)  Plan for Continued Treatment (SLP): treatment no longer indicated as all goals met (02/05/22 1230)         Plan of Care Reviewed With: patient      SWALLOW EVALUATION (last 72 hours)     SLP Adult Swallow Evaluation     Row Name 02/05/22 1230                   Rehab Evaluation    Document Type therapy note (daily note)  -CJ        Subjective Information no complaints  -CJ        Patient Observations alert; cooperative; agree to therapy  -CJ        Patient/Family/Caregiver Comments/Observations no family present; covid positive  -CJ        Patient Effort good  -CJ        Symptoms Noted During/After Treatment none  -CJ                  Pain    Additional Documentation Pain Scale: FACES Pre/Post-Treatment (Group)  -CJ                  Pain Scale: FACES Pre/Post-Treatment    Pain: FACES Scale, Pretreatment 0-->no hurt  -CJ        Posttreatment Pain Rating 0-->no hurt  -CJ                  SLP Evaluation Clinical Impression    SLP Swallowing Diagnosis functional oral phase; functional pharyngeal phase  -CJ        Functional Impact no impact on function  -CJ        Rehab Potential/Prognosis, Swallowing good, to achieve stated therapy goals  -CJ        Swallow Criteria for Skilled Therapeutic Interventions Met no problems identified which require skilled intervention  -CJ                  SLP Treatment Clinical Impressions    Treatment Assessment (SLP) Pt seen  for diet tolerance this pm. Pt accepted multiple trials of solids and thin liquids w/o overt s/s of aspiration even when pushed w/ consecutive sips. Will continue regular diet w/ thin liquids and SLP will sign off at this time  -        Daily Summary of Progress (SLP) progress toward functional goals is good  -        Plan for Continued Treatment (SLP) treatment no longer indicated as all goals met  -        Care Plan Review evaluation/treatment results reviewed; care plan/treatment goals reviewed; patient/other agree to care plan  -                  Recommendations    Predicted Duration Therapy Intervention (Days) 3 days  -        SLP Diet Recommendation regular textures; thin liquids  -        Recommended Diagnostics No further SLP services recommended  -        Recommended Precautions and Strategies upright posture during/after eating; small bites of food and sips of liquid; general aspiration precautions  -        Oral Care Recommendations Oral Care BID/PRN  -        SLP Rec. for Method of Medication Administration meds whole; with thin liquids; as tolerated  -        Monitor for Signs of Aspiration yes; notify SLP if any concerns  -        Anticipated Discharge Disposition (SLP) unknown; anticipate therapy at next level of care  -              User Key  (r) = Recorded By, (t) = Taken By, (c) = Cosigned By    Initials Name Effective Dates     Laureen Byrd, MS CCC-SLP 06/16/21 -                 EDUCATION  The patient has been educated in the following areas:   Dysphagia (Swallowing Impairment) Oral Care/Hydration.        SLP GOALS     Row Name 02/05/22 1230             Oral Nutrition/Hydration Goal 1 (SLP)    Oral Nutrition/Hydration Goal 1, SLP Patient will tolerate regular diet and thin liquids without s/s of aspiration in 100% of trials  -      Time Frame (Oral Nutrition/Hydration Goal 1, SLP) by discharge  -      Progress/Outcomes (Oral Nutrition/Hydration Goal 1, SLP) goal  met  -CJ              Oral Nutrition/Hydration Goal 2 (SLP)    Oral Nutrition/Hydration Goal 2, SLP LTG: Patient will tolerate regular diet and thin liquids without s/s of aspiration in 100% of trials  -CJ      Time Frame (Oral Nutrition/Hydration Goal 2, SLP) by discharge  -CJ      Barriers (Oral Nutrition/Hydration Goal 2, SLP) Pt accepted multiple presentations of regular solids and thin liquids w/no overt s/s of aspiration/distress  -CJ      Progress/Outcomes (Oral Nutrition/Hydration Goal 2, SLP) goal met  -CJ            User Key  (r) = Recorded By, (t) = Taken By, (c) = Cosigned By    Initials Name Provider Type    Laureen Pressley MS CCC-SLP Speech and Language Pathologist                   Time Calculation:    Time Calculation- SLP     Row Name 02/05/22 1257             Time Calculation- SLP    SLP Start Time 1230  -      SLP Received On 02/05/22  -              Untimed Charges    45763-FG Treatment Swallow Minutes 24  -CJ              Total Minutes    Untimed Charges Total Minutes 24  -CJ       Total Minutes 24  -CJ            User Key  (r) = Recorded By, (t) = Taken By, (c) = Cosigned By    Initials Name Provider Type    Laureen Pressley MS CCC-SLP Speech and Language Pathologist                Therapy Charges for Today     Code Description Service Date Service Provider Modifiers Qty    43192299877 HC ST TREATMENT SWALLOW 2 2/5/2022 Laureen Byrd MS CCC-SLP GN 1               Laureen Byrd MS CCC-NASRIN  2/5/2022

## 2022-02-05 NOTE — PLAN OF CARE
Goal Outcome Evaluation:Pt.had an overall good day. She ate the majority of every meal asking for snacks in between. She is remaining on 3L/ NC and vital signs remain stable.

## 2022-02-05 NOTE — PLAN OF CARE
Goal Outcome Evaluation:  Plan of Care Reviewed With: patient            SLP treatment completed. Will sign-off for dysphagia. Please see note for further details and recommendations.
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
## 2022-02-06 PROCEDURE — 94799 UNLISTED PULMONARY SVC/PX: CPT

## 2022-02-06 PROCEDURE — 94761 N-INVAS EAR/PLS OXIMETRY MLT: CPT

## 2022-02-06 PROCEDURE — 63710000001 DEXAMETHASONE PER 0.25 MG: Performed by: INTERNAL MEDICINE

## 2022-02-06 PROCEDURE — 25010000002 ENOXAPARIN PER 10 MG: Performed by: INTERNAL MEDICINE

## 2022-02-06 PROCEDURE — 99233 SBSQ HOSP IP/OBS HIGH 50: CPT | Performed by: INTERNAL MEDICINE

## 2022-02-06 RX ADMIN — SODIUM CHLORIDE, PRESERVATIVE FREE 10 ML: 5 INJECTION INTRAVENOUS at 09:08

## 2022-02-06 RX ADMIN — PANCRELIPASE 36000 UNITS OF LIPASE: 36000; 180000; 114000 CAPSULE, DELAYED RELEASE PELLETS ORAL at 17:02

## 2022-02-06 RX ADMIN — GUAIFENESIN 1200 MG: 600 TABLET, EXTENDED RELEASE ORAL at 09:08

## 2022-02-06 RX ADMIN — SENNOSIDES AND DOCUSATE SODIUM 2 TABLET: 50; 8.6 TABLET ORAL at 20:29

## 2022-02-06 RX ADMIN — ALBUTEROL SULFATE 2 PUFF: 90 AEROSOL, METERED RESPIRATORY (INHALATION) at 19:01

## 2022-02-06 RX ADMIN — ALBUTEROL SULFATE 2 PUFF: 90 AEROSOL, METERED RESPIRATORY (INHALATION) at 11:11

## 2022-02-06 RX ADMIN — CEFUROXIME AXETIL 500 MG: 250 TABLET ORAL at 20:28

## 2022-02-06 RX ADMIN — PANCRELIPASE 36000 UNITS OF LIPASE: 36000; 180000; 114000 CAPSULE, DELAYED RELEASE PELLETS ORAL at 09:11

## 2022-02-06 RX ADMIN — ALBUTEROL SULFATE 2 PUFF: 90 AEROSOL, METERED RESPIRATORY (INHALATION) at 15:57

## 2022-02-06 RX ADMIN — CEFUROXIME AXETIL 500 MG: 250 TABLET ORAL at 09:07

## 2022-02-06 RX ADMIN — TIOTROPIUM BROMIDE INHALATION SPRAY 2 PUFF: 3.12 SPRAY, METERED RESPIRATORY (INHALATION) at 07:26

## 2022-02-06 RX ADMIN — GUAIFENESIN 1200 MG: 600 TABLET, EXTENDED RELEASE ORAL at 20:28

## 2022-02-06 RX ADMIN — PANTOPRAZOLE SODIUM 40 MG: 40 TABLET, DELAYED RELEASE ORAL at 05:48

## 2022-02-06 RX ADMIN — ALBUTEROL SULFATE 2 PUFF: 90 AEROSOL, METERED RESPIRATORY (INHALATION) at 07:26

## 2022-02-06 RX ADMIN — SENNOSIDES AND DOCUSATE SODIUM 2 TABLET: 50; 8.6 TABLET ORAL at 09:08

## 2022-02-06 RX ADMIN — SODIUM CHLORIDE, PRESERVATIVE FREE 10 ML: 5 INJECTION INTRAVENOUS at 20:29

## 2022-02-06 RX ADMIN — ENOXAPARIN SODIUM 40 MG: 40 INJECTION SUBCUTANEOUS at 20:29

## 2022-02-06 RX ADMIN — DEXAMETHASONE 6 MG: 2 TABLET ORAL at 09:08

## 2022-02-06 RX ADMIN — OXYCODONE HYDROCHLORIDE AND ACETAMINOPHEN 1 TABLET: 5; 325 TABLET ORAL at 20:29

## 2022-02-06 RX ADMIN — Medication 1 TABLET: at 09:08

## 2022-02-06 RX ADMIN — PANCRELIPASE 36000 UNITS OF LIPASE: 36000; 180000; 114000 CAPSULE, DELAYED RELEASE PELLETS ORAL at 11:33
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
## 2022-02-06 NOTE — PLAN OF CARE
Goal Outcome Evaluation:           Progress: no change  Outcome Summary: Pt VSS, requiring 2-3L/NC. Pain controlled by prn pain medication. Restin on and off throughout the night. Will cont to monitor.

## 2022-02-07 ENCOUNTER — READMISSION MANAGEMENT (OUTPATIENT)
Dept: CALL CENTER | Facility: HOSPITAL | Age: 62
End: 2022-02-07

## 2022-02-07 VITALS
BODY MASS INDEX: 11 KG/M2 | HEIGHT: 67 IN | WEIGHT: 70.11 LBS | SYSTOLIC BLOOD PRESSURE: 115 MMHG | RESPIRATION RATE: 18 BRPM | TEMPERATURE: 98 F | HEART RATE: 78 BPM | OXYGEN SATURATION: 86 % | DIASTOLIC BLOOD PRESSURE: 62 MMHG

## 2022-02-07 PROBLEM — D89.832 CYTOKINE RELEASE SYNDROME, GRADE 2: Status: ACTIVE | Noted: 2022-02-07

## 2022-02-07 LAB
BASOPHILS # BLD AUTO: 0.02 10*3/MM3 (ref 0–0.2)
BASOPHILS NFR BLD AUTO: 0.1 % (ref 0–1.5)
DEPRECATED RDW RBC AUTO: 48.1 FL (ref 37–54)
EOSINOPHIL # BLD AUTO: 0.1 10*3/MM3 (ref 0–0.4)
EOSINOPHIL NFR BLD AUTO: 0.7 % (ref 0.3–6.2)
ERYTHROCYTE [DISTWIDTH] IN BLOOD BY AUTOMATED COUNT: 14.6 % (ref 12.3–15.4)
ERYTHROCYTE [SEDIMENTATION RATE] IN BLOOD: 7 MM/HR (ref 0–30)
HCT VFR BLD AUTO: 38.7 % (ref 34–46.6)
HGB BLD-MCNC: 13.3 G/DL (ref 12–15.9)
IMM GRANULOCYTES # BLD AUTO: 0.08 10*3/MM3 (ref 0–0.05)
IMM GRANULOCYTES NFR BLD AUTO: 0.6 % (ref 0–0.5)
LYMPHOCYTES # BLD AUTO: 1.56 10*3/MM3 (ref 0.7–3.1)
LYMPHOCYTES NFR BLD AUTO: 11.5 % (ref 19.6–45.3)
MCH RBC QN AUTO: 30.9 PG (ref 26.6–33)
MCHC RBC AUTO-ENTMCNC: 34.4 G/DL (ref 31.5–35.7)
MCV RBC AUTO: 90 FL (ref 79–97)
MONOCYTES # BLD AUTO: 0.67 10*3/MM3 (ref 0.1–0.9)
MONOCYTES NFR BLD AUTO: 4.9 % (ref 5–12)
NEUTROPHILS NFR BLD AUTO: 11.16 10*3/MM3 (ref 1.7–7)
NEUTROPHILS NFR BLD AUTO: 82.2 % (ref 42.7–76)
NRBC BLD AUTO-RTO: 0 /100 WBC (ref 0–0.2)
PLATELET # BLD AUTO: 288 10*3/MM3 (ref 140–450)
PMV BLD AUTO: 10.5 FL (ref 6–12)
RBC # BLD AUTO: 4.3 10*6/MM3 (ref 3.77–5.28)
WBC NRBC COR # BLD: 13.59 10*3/MM3 (ref 3.4–10.8)

## 2022-02-07 PROCEDURE — 94799 UNLISTED PULMONARY SVC/PX: CPT

## 2022-02-07 PROCEDURE — 99239 HOSP IP/OBS DSCHRG MGMT >30: CPT | Performed by: NURSE PRACTITIONER

## 2022-02-07 PROCEDURE — 94761 N-INVAS EAR/PLS OXIMETRY MLT: CPT

## 2022-02-07 PROCEDURE — 85652 RBC SED RATE AUTOMATED: CPT | Performed by: INTERNAL MEDICINE

## 2022-02-07 PROCEDURE — 85025 COMPLETE CBC W/AUTO DIFF WBC: CPT | Performed by: INTERNAL MEDICINE

## 2022-02-07 PROCEDURE — 97530 THERAPEUTIC ACTIVITIES: CPT

## 2022-02-07 PROCEDURE — 63710000001 DEXAMETHASONE PER 0.25 MG: Performed by: INTERNAL MEDICINE

## 2022-02-07 RX ORDER — MULTIPLE VITAMINS W/ MINERALS TAB 9MG-400MCG
1 TAB ORAL DAILY
Start: 2022-02-08

## 2022-02-07 RX ORDER — ACETAMINOPHEN 325 MG/1
650 TABLET ORAL EVERY 4 HOURS PRN
Start: 2022-02-07

## 2022-02-07 RX ORDER — FAMOTIDINE 10 MG
10 TABLET ORAL 2 TIMES DAILY PRN
Start: 2022-02-07

## 2022-02-07 RX ORDER — DEXAMETHASONE 6 MG/1
6 TABLET ORAL DAILY
Qty: 2 TABLET | Refills: 0 | Status: SHIPPED | OUTPATIENT
Start: 2022-02-08 | End: 2022-02-10

## 2022-02-07 RX ORDER — CEFUROXIME AXETIL 500 MG/1
500 TABLET ORAL EVERY 12 HOURS SCHEDULED
Qty: 5 TABLET | Refills: 0 | Status: SHIPPED | OUTPATIENT
Start: 2022-02-07 | End: 2022-02-10

## 2022-02-07 RX ORDER — GUAIFENESIN 600 MG/1
1200 TABLET, EXTENDED RELEASE ORAL EVERY 12 HOURS SCHEDULED
Start: 2022-02-07

## 2022-02-07 RX ORDER — DEXTROMETHORPHAN POLISTIREX 30 MG/5ML
60 SUSPENSION ORAL EVERY 12 HOURS PRN
Start: 2022-02-07

## 2022-02-07 RX ORDER — ALBUTEROL SULFATE 90 UG/1
2 AEROSOL, METERED RESPIRATORY (INHALATION)
Qty: 6.7 G | Refills: 0 | Status: SHIPPED | OUTPATIENT
Start: 2022-02-07

## 2022-02-07 RX ORDER — CALCIUM CARBONATE 200(500)MG
2 TABLET,CHEWABLE ORAL 2 TIMES DAILY PRN
Start: 2022-02-07

## 2022-02-07 RX ADMIN — GUAIFENESIN 1200 MG: 600 TABLET, EXTENDED RELEASE ORAL at 08:26

## 2022-02-07 RX ADMIN — DEXAMETHASONE 6 MG: 2 TABLET ORAL at 08:26

## 2022-02-07 RX ADMIN — ALBUTEROL SULFATE 2 PUFF: 90 AEROSOL, METERED RESPIRATORY (INHALATION) at 11:38

## 2022-02-07 RX ADMIN — PANCRELIPASE 36000 UNITS OF LIPASE: 36000; 180000; 114000 CAPSULE, DELAYED RELEASE PELLETS ORAL at 08:26

## 2022-02-07 RX ADMIN — Medication 1 TABLET: at 08:26

## 2022-02-07 RX ADMIN — CEFUROXIME AXETIL 500 MG: 250 TABLET ORAL at 08:26

## 2022-02-07 RX ADMIN — SENNOSIDES AND DOCUSATE SODIUM 2 TABLET: 50; 8.6 TABLET ORAL at 08:26

## 2022-02-07 RX ADMIN — PANCRELIPASE 36000 UNITS OF LIPASE: 36000; 180000; 114000 CAPSULE, DELAYED RELEASE PELLETS ORAL at 11:24

## 2022-02-07 RX ADMIN — TIOTROPIUM BROMIDE INHALATION SPRAY 2 PUFF: 3.12 SPRAY, METERED RESPIRATORY (INHALATION) at 08:54

## 2022-02-07 RX ADMIN — ALBUTEROL SULFATE 2 PUFF: 90 AEROSOL, METERED RESPIRATORY (INHALATION) at 08:54

## 2022-02-07 RX ADMIN — PANTOPRAZOLE SODIUM 40 MG: 40 TABLET, DELAYED RELEASE ORAL at 06:30

## 2022-02-07 NOTE — PLAN OF CARE
Problem: Adult Inpatient Plan of Care  Goal: Plan of Care Review  Outcome: Ongoing, Progressing  Flowsheets  Taken 2/7/2022 1328 by Sangita Altman RN  Progress: no change  Outcome Summary: Pt. being DCd  Taken 2/7/2022 0937 by Sade Wright OT  Plan of Care Reviewed With: patient  Goal: Patient-Specific Goal (Individualized)  Outcome: Ongoing, Progressing  Goal: Absence of Hospital-Acquired Illness or Injury  Outcome: Ongoing, Progressing  Intervention: Identify and Manage Fall Risk  Recent Flowsheet Documentation  Taken 2/7/2022 1200 by Sangita Altman RN  Safety Promotion/Fall Prevention: safety round/check completed  Taken 2/7/2022 1000 by Sangita Altman RN  Safety Promotion/Fall Prevention: safety round/check completed  Taken 2/7/2022 0800 by Sangita Altman RN  Safety Promotion/Fall Prevention:   activity supervised   assistive device/personal items within reach   clutter free environment maintained   fall prevention program maintained   mobility aid in reach   muscle strengthening facilitated   nonskid shoes/slippers when out of bed   room organization consistent   safety round/check completed   toileting scheduled  Intervention: Prevent and Manage VTE (venous thromboembolism) Risk  Recent Flowsheet Documentation  Taken 2/7/2022 0800 by Sangita Altman RN  VTE Prevention/Management:   bilateral   dorsiflexion/plantar flexion performed   bleeding risk factor(s) identified  Goal: Optimal Comfort and Wellbeing  Outcome: Ongoing, Progressing  Intervention: Provide Person-Centered Care  Recent Flowsheet Documentation  Taken 2/7/2022 0800 by Sangita Altman RN  Trust Relationship/Rapport:   care explained   choices provided  Goal: Readiness for Transition of Care  Outcome: Ongoing, Progressing     Problem: Skin Injury Risk Increased  Goal: Skin Health and Integrity  Outcome: Ongoing, Progressing     Problem: Adjustment to Illness COPD (Chronic Obstructive Pulmonary Disease)  Goal: Optimal Chronic Illness  Coping  Outcome: Ongoing, Progressing     Problem: Functional Ability Impaired COPD (Chronic Obstructive Pulmonary Disease)  Goal: Optimal Level of Functional [place]  Outcome: Ongoing, Progressing  Intervention: Optimize Functional Ability  Recent Flowsheet Documentation  Taken 2/7/2022 1200 by Sangita Altman RN  Activity Management: activity adjusted per tolerance  Taken 2/7/2022 1000 by Sangita Altman RN  Activity Management: activity adjusted per tolerance  Taken 2/7/2022 0800 by Sangita Altman RN  Activity Management: activity adjusted per tolerance     Problem: Infection COPD (Chronic Obstructive Pulmonary Disease)  Goal: Absence of Infection Signs and Symptoms  Outcome: Ongoing, Progressing  Intervention: Prevent or Manage Infection  Recent Flowsheet Documentation  Taken 2/7/2022 0800 by Sangita Altman RN  Isolation Precautions:   airborne precautions maintained   contact precautions maintained     Problem: Oral Intake Inadequate COPD (Chronic Obstructive Pulmonary Disease)  Goal: Improved Nutrition Intake  Outcome: Ongoing, Progressing     Problem: Respiratory Compromise COPD (Chronic Obstructive Pulmonary Disease)  Goal: Effective Oxygenation and Ventilation  Outcome: Ongoing, Progressing  Intervention: Promote Airway Secretion Clearance  Recent Flowsheet Documentation  Taken 2/7/2022 1200 by Sangita Altman RN  Activity Management: activity adjusted per tolerance  Taken 2/7/2022 1000 by Sangita Altman RN  Activity Management: activity adjusted per tolerance  Taken 2/7/2022 0800 by Sangita Altman RN  Activity Management: activity adjusted per tolerance   Goal Outcome Evaluation:           Progress: no change  Outcome Summary: Pt. being DCd
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
## 2022-02-07 NOTE — THERAPY TREATMENT NOTE
Patient Name: Mary West  : 1960    MRN: [id number]                              Today's Date: 2022       Admit Date: 2022    Visit Dx:     ICD-10-CM ICD-9-CM   1. Acute respiratory failure due to COVID-19 (HCC)  U07.1 518.81    J96.00 079.89   2. Pneumonia of right middle lobe due to infectious organism  J18.9 486   3. History of COPD  Z87.09 V12.69   4. Dysphagia, unspecified type  R13.10 787.20     Patient Active Problem List   Diagnosis   • COVID-19   • Severe centrilobular emphysema recently on home oxygen  (HCC)   • Acute on chronic respiratory failure (HCC)   • Acute respiratory failure due to COVID-19 (HCC)   • Remote history alcohol abuse   • Tobacco dependence   • Suspected community acquired pneumonia of RML and RLL of lung   • Chronic Pancreatitis   • Chronic narcotic use     Past Medical History:   Diagnosis Date   • Alcohol abuse     Quit early    • COPD (chronic obstructive pulmonary disease) ([facility])    • Pancreatitis      History reviewed. No pertinent surgical history.   General Information     Row Name 22          OT Time and Intention    Document Type therapy note (daily note)  -AC     Mode of Treatment occupational therapy  -AC     Row Name 22          General Information    Patient Profile Reviewed yes  -AC     Existing Precautions/Restrictions oxygen therapy device and L/min  contact/airborne  -AC     Row Name 22          Cognition    Orientation Status (Cognition) oriented x 4  -AC     Row Name 22          Safety Issues, Functional Mobility    Impairments Affecting Function (Mobility) balance; strength; shortness of breath; endurance/activity tolerance  -AC           User Key  (r) = Recorded By, (t) = Taken By, (c) = Cosigned By    Initials Name Provider Type    AC Sade Wright OT Occupational Therapist                 Mobility/ADL's     Row Name 22          Bed Mobility    Bed Mobility supine-sit; sit-supine   -AC     Supine-Sit Barry (Bed Mobility) modified independence  -AC     Sit-Supine Barry (Bed Mobility) modified independence  -AC     Assistive Device (Bed Mobility) bed rails; head of bed elevated  -[facility] Name 02/07/22 0901          Transfers    Transfers sit-stand transfer  -     Sit-Stand Barry (Transfers) independent  -     Row Name 02/07/22 0901          Sit-Stand Transfer    Assistive Device (Sit-Stand Transfers) other (see comments)  No AD  -AC     Row Name 02/07/22 0901          Functional Mobility    Functional Mobility- Ind. Level supervision required  -     Functional Mobility- Device other (see comments)  No AD  -AC     Functional Mobility-Distance (Feet) 80  -     Row Name 02/07/22 0901          Activities of Daily Living    BADL Assessment/Intervention lower body dressing  declined sinkside tasks  -[facility] Name 02/07/22 0901          Lower Body Dressing Assessment/Training    Barry Level (Lower Body Dressing) don; socks; set up  -     Position (Lower Body Dressing) sitting up in bed  -           User Key  (r) = Recorded By, (t) = Taken By, (c) = Cosigned By    Initials Name Provider Type    Sade Chaudhry, OT Occupational Therapist               Obj/Interventions     Row Name 02/07/22 0936          Therapeutic Exercise    Therapeutic Exercise --  declined exercise  -           User Key  (r) = Recorded By, (t) = Taken By, (c) = Cosigned By    Initials Name Provider Type    Sade Chaudhry, OT Occupational Therapist               Goals/Plan    No documentation.                Clinical Impression     [facility] Name 02/07/22 0937          Pain Assessment    Additional Documentation Pain Scale: FACES Pre/Post-Treatment (Group)  -[facility] Name 02/07/22 0937          Pain Scale: FACES Pre/Post-Treatment    Pain: FACES Scale, Pretreatment 0-->no hurt  -     Posttreatment Pain Rating 0-->no hurt  -[facility] Name 02/07/22 0937          Plan of Care Review     Plan of Care Reviewed With patient  -AC     Progress improving  -AC     Outcome Summary Pt reluctant to participate d/t concern/worry over her dtr being in the hospital and not being shira to do anything for her.  Pt declined sinkside tasks and UE pulmonary TE.  Pt setup to don socks,  mod ind supine to sit,  independent STS,  supervision to ambulate 80 ft in room without AD with 2 slight LOB with self recovery.  Pt's O2 sat 94-91% on 3LO2.  -AC     Row Name 02/07/22 0937          Vital Signs    Pre Systolic BP Rehab 106  -AC     Pre Treatment Diastolic BP 72  -AC     Post Systolic BP Rehab 100  -AC     Post Treatment Diastolic BP 52  -AC     Pretreatment Heart Rate (beats/min) 71  -AC     Pre SpO2 (%) 94  -AC     O2 Delivery Pre Treatment supplemental O2  -AC     Intra SpO2 (%) 91  -AC     O2 Delivery Intra Treatment supplemental O2  -AC     Post SpO2 (%) 91  -AC     O2 Delivery Post Treatment supplemental O2  -AC     Pre Patient Position Supine  -AC     Intra Patient Position Standing  -AC     Post Patient Position Supine  -AC     Rest Breaks  1  -AC     Row Name 02/07/22 0937          Positioning and Restraints    Pre-Treatment Position in bed  -AC     Post Treatment Position bed  -AC     In Bed notified nsg; fowlers; call light within reach  -AC           User Key  (r) = Recorded By, (t) = Taken By, (c) = Cosigned By    Initials Name Provider Type    AC Sade Wright, OT Occupational Therapist               Outcome Measures     Row Name 02/07/22 0901          How much help from another is currently needed...    Putting on and taking off regular lower body clothing? 4  -AC     Bathing (including washing, rinsing, and drying) 3  -AC     Toileting (which includes using toilet bed pan or urinal) 4  -AC     Putting on and taking off regular upper body clothing 4  -AC     Taking care of personal grooming (such as brushing teeth) 3  -AC     Eating meals 4  -AC     AM-PAC 6 Clicks Score (OT) 22  -AC     Row Name 02/07/22  0901          Functional Assessment    Outcome Measure Options AM-PAC 6 Clicks Daily Activity (OT)  -AC           User Key  (r) = Recorded By, (t) = Taken By, (c) = Cosigned By    Initials Name Provider Type    Sade Chaudhry OT Occupational Therapist                Occupational Therapy Education                 Title: PT OT SLP Therapies (Done)     Topic: Occupational Therapy (Done)     Point: ADL training (Done)     Description:   Instruct learner(s) on proper safety adaptation and remediation techniques during self care or transfers.   Instruct in proper use of assistive devices.              Learning Progress Summary           Patient Acceptance, E,D,H, VU by DM at 2/4/2022 1322    Acceptance, E,TB, VU by NB at 2/2/2022 1847    Acceptance, E, VU by MR at 2/2/2022 1101                   Point: Home exercise program (Done)     Description:   Instruct learner(s) on appropriate technique for monitoring, assisting and/or progressing therapeutic exercises/activities.              Learning Progress Summary           Patient Acceptance, E,D,H, VU by DM at 2/4/2022 1322    Acceptance, E,TB, VU by NB at 2/2/2022 1847    Acceptance, E, VU by MR at 2/2/2022 1101                   Point: Precautions (Done)     Description:   Instruct learner(s) on prescribed precautions during self-care and functional transfers.              Learning Progress Summary           Patient Acceptance, E,D,H, VU by DM at 2/4/2022 1322    Acceptance, E,TB, VU by NB at 2/2/2022 1847    Acceptance, E, VU by MR at 2/2/2022 1101                   Point: Body mechanics (Done)     Description:   Instruct learner(s) on proper positioning and spine alignment during self-care, functional mobility activities and/or exercises.              Learning Progress Summary           Patient Acceptance, E,D,H, VU by DM at 2/4/2022 1322    Acceptance, E,TB, VU by NB at 2/2/2022 1847    Acceptance, E, VU by MR at 2/2/2022 1101                               User Key      Initials Effective Dates Name Provider Type Discipline    DM 06/16/21 -  Chrsita Sumner, PT Physical Therapist PT    MR 10/06/21 -  Vanessa Iraheta, OT Occupational Therapist OT    NB 01/04/22 -  Micah Barron, RN Registered Nurse Nurse              OT Recommendation and Plan     Plan of Care Review  Plan of Care Reviewed With: patient  Progress: improving  Outcome Summary: Pt reluctant to participate d/t concern/worry over her dtr being in the hospital and not being shira to do anything for her.  Pt declined sinkside tasks and UE pulmonary TE.  Pt setup to don socks,  mod ind supine to sit,  independent STS,  supervision to ambulate 80 ft in room without AD with 2 slight LOB with self recovery.  Pt's O2 sat 94-91% on 3LO2.     Time Calculation:    Time Calculation- OT     Row Name 02/07/22 0901             Time Calculation- OT    OT Start Time 0901  -AC      OT Received On 02/07/22  -AC      OT Goal Re-Cert Due Date 02/12/22  -AC              Timed Charges    29392 - OT Therapeutic Activity Minutes 15  -AC      44277 - OT Self Care/Mgmt Minutes 2  -AC              Total Minutes    Timed Charges Total Minutes 17  -AC       Total Minutes 17  -AC            User Key  (r) = Recorded By, (t) = Taken By, (c) = Cosigned By    Initials Name Provider Type    AC Sade Wright, OT Occupational Therapist              Therapy Charges for Today     Code Description Service Date Service Provider Modifiers Qty    [id number]  OT THERAPEUTIC ACT EA 15 MIN 2/7/2022 Sade Wright OT GO 1               Sade Wright OT  2/7/2022

## 2022-02-07 NOTE — PLAN OF CARE
Goal Outcome Evaluation:  Plan of Care Reviewed With: patient        Progress: improving  Outcome Summary: Pt reluctant to participate d/t concern/worry over her dtr being in the hospital and not being shira to do anything for her.  Pt declined sinkside tasks and UE pulmonary TE.  Pt setup to don socks,  mod ind supine to sit,  independent STS,  supervision to ambulate 80 ft in room without AD with 2 slight LOB with self recovery.  Pt's O2 sat 94-91% on 3LO2.

## 2022-02-07 NOTE — OUTREACH NOTE
Prep Survey      Responses   [facility] facility patient discharged from? [facility]   Is LACE score < 7 ? No   Emergency Room discharge w/ pulse ox? No   Eligibility [facility]   Date of Admission 01/30/22   Date of Discharge 02/07/22   Discharge Disposition Home or Self Care   Discharge diagnosis Acute respiratory failure due to COVID  Suspected community acquired pneumonia    Does the patient have one of the following disease processes/diagnoses(primary or secondary)? COVID-19   Does the patient have Home health ordered? No   Is there a DME ordered? Yes   What DME was ordered?  O2. I spoke with Elba/Blu   Prep survey completed? Yes          Twyla Fernandez RN

## 2022-02-07 NOTE — PROGRESS NOTES
NN spoke with patient via telephone due to isolation.  Per current documentation, O2 at  2L.  COPD action plan and deep breathing exercises reviewed.  Patient informed of upcoming pulmonary follow up.  One month free supply of Spiriva Respimat voucher given to RN.  Patient expressed frustration at being in the hospital and unable to help her daughter.  No new questions or concerns at this time.  NN continues to follow.        Per current [employer] Standards, please consider: NRT at DC, No rescue in place, No LAMA/LABA/ICS in place, Outpatient PFT, Rehab as appropriate           Patient has been scheduled for a hospital follow up and to establish care with [facility] for 3/ 2/2022 @9:30 am with  RAFA Castaneda.

## 2022-02-07 NOTE — CASE MANAGEMENT/SOCIAL WORK
Continued Stay Note  [facility]     Patient Name: Mary West  MRN: [id number]  Today's Date: 2/7/2022    Admit Date: 1/30/2022     Discharge Plan     Row Name 02/07/22 1151       Plan    Plan SW    Plan Comments SW’er spoke with patient via phone due to COVID. Patient reports her social need as Housing. Patient reports she has some income. SW’er discussed providing patient with a list of low-income housing. Patient agreed she would like that; declined any other need at this time. SW’er will provide RN with housing packet to give to patient. SW’er offered shelter and homeless resources. Patient declined and explained she would be staying with her boss at discharge. SW Available Luz GONZALEZ                Discharge Codes    No documentation.               Expected Discharge Date and Time     Expected Discharge Date Expected Discharge Time    Feb 9, 2022             CARLOS Dozier (Kay)
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
## 2022-02-08 ENCOUNTER — TRANSITIONAL CARE MANAGEMENT TELEPHONE ENCOUNTER (OUTPATIENT)
Dept: CALL CENTER | Facility: HOSPITAL | Age: 62
End: 2022-02-08

## 2022-02-08 NOTE — OUTREACH NOTE
Call Center TCM Note      Responses   [facility] patient discharged from? [facility]   Does the patient have one of the following disease processes/diagnoses(primary or secondary)? COVID-19   COVID-19 underlying condition? COPD   TCM attempt successful? No   Unsuccessful attempts Attempt 2   Discharge diagnosis Acute respiratory failure due to COVID  Suspected community acquired pneumonia           Twyla Ochoa MA    2/8/2022, 16:09 EST

## 2022-02-08 NOTE — OUTREACH NOTE
Call Center TCM Note      Responses   [facility] patient discharged from? [place]   Does the patient have one of the following disease processes/diagnoses(primary or secondary)? COVID-19   COVID-19 underlying condition? COPD   TCM attempt successful? No   Unsuccessful attempts Attempt 1   Call Status Left message   Discharge diagnosis Acute respiratory failure due to COVID  Suspected community acquired pneumonia           Twyla Ochoa MA    2/8/2022, 13:15 EST

## 2022-02-09 ENCOUNTER — TRANSITIONAL CARE MANAGEMENT TELEPHONE ENCOUNTER (OUTPATIENT)
Dept: CALL CENTER | Facility: HOSPITAL | Age: 62
End: 2022-02-09

## 2022-02-09 NOTE — OUTREACH NOTE
Call Center TCM Note      Responses   [facility] patient discharged from? [place]   Does the patient have one of the following disease processes/diagnoses(primary or secondary)? COVID-19   COVID-19 underlying condition? COPD   TCM attempt successful? No   Unsuccessful attempts Attempt 3   Discharge diagnosis Acute respiratory failure due to COVID  Suspected community acquired pneumonia           Elyssa Parsons LPN    2/9/2022, 16:05 EST

## 2022-02-10 ENCOUNTER — READMISSION MANAGEMENT (OUTPATIENT)
Dept: CALL CENTER | Facility: HOSPITAL | Age: 62
End: 2022-02-10

## 2022-02-10 NOTE — OUTREACH NOTE
COVID-19 Week 1 Survey      Responses   [facility] patient discharged from? [place]   Does the patient have one of the following disease processes/diagnoses(primary or secondary)? COVID-19   COVID-19 underlying condition? COPD   Call Number Call 2   Week 1 Call successful? No   Revoke Decline to participate   Discharge diagnosis Acute respiratory failure due to COVID  Suspected community acquired pneumonia           Nae Cooper RN

## 2023-07-17 ENCOUNTER — APPOINTMENT (OUTPATIENT)
Dept: CT IMAGING | Facility: HOSPITAL | Age: 63
DRG: 193 | End: 2023-07-17
Payer: MEDICARE

## 2023-07-17 ENCOUNTER — HOSPITAL ENCOUNTER (INPATIENT)
Facility: HOSPITAL | Age: 63
LOS: 6 days | Discharge: HOME | DRG: 193 | End: 2023-07-23
Attending: EMERGENCY MEDICINE | Admitting: INTERNAL MEDICINE
Payer: MEDICARE

## 2023-07-17 ENCOUNTER — APPOINTMENT (OUTPATIENT)
Dept: GENERAL RADIOLOGY | Facility: HOSPITAL | Age: 63
DRG: 193 | End: 2023-07-17
Payer: MEDICARE

## 2023-07-17 DIAGNOSIS — Z72.0 TOBACCO ABUSE: ICD-10-CM

## 2023-07-17 DIAGNOSIS — U07.1 ACUTE RESPIRATORY FAILURE DUE TO COVID-19: ICD-10-CM

## 2023-07-17 DIAGNOSIS — J96.21 ACUTE ON CHRONIC RESPIRATORY FAILURE WITH HYPOXIA: ICD-10-CM

## 2023-07-17 DIAGNOSIS — Z74.09 IMPAIRED FUNCTIONAL MOBILITY, BALANCE, GAIT, AND ENDURANCE: ICD-10-CM

## 2023-07-17 DIAGNOSIS — R09.02 HYPOXIA: ICD-10-CM

## 2023-07-17 DIAGNOSIS — J44.1 COPD EXACERBATION: ICD-10-CM

## 2023-07-17 DIAGNOSIS — J44.1 ACUTE EXACERBATION OF CHRONIC OBSTRUCTIVE PULMONARY DISEASE (COPD): ICD-10-CM

## 2023-07-17 DIAGNOSIS — J18.9 PNEUMONIA OF LEFT LOWER LOBE DUE TO INFECTIOUS ORGANISM: ICD-10-CM

## 2023-07-17 DIAGNOSIS — J96.00 ACUTE RESPIRATORY FAILURE DUE TO COVID-19: ICD-10-CM

## 2023-07-17 DIAGNOSIS — J44.1 COPD WITH ACUTE EXACERBATION: Primary | ICD-10-CM

## 2023-07-17 PROBLEM — E16.2 HYPOGLYCEMIA: Status: ACTIVE | Noted: 2023-07-17

## 2023-07-17 LAB
ALBUMIN SERPL-MCNC: 4.1 G/DL (ref 3.5–5.2)
ALBUMIN/GLOB SERPL: 1.6 G/DL
ALP SERPL-CCNC: 109 U/L (ref 39–117)
ALT SERPL W P-5'-P-CCNC: 12 U/L (ref 1–33)
ANION GAP SERPL CALCULATED.3IONS-SCNC: 8 MMOL/L (ref 5–15)
ARTERIAL PATENCY WRIST A: ABNORMAL
AST SERPL-CCNC: 23 U/L (ref 1–32)
ATMOSPHERIC PRESS: ABNORMAL MM[HG]
BASE EXCESS BLDA CALC-SCNC: 0.6 MMOL/L (ref 0–2)
BASOPHILS # BLD AUTO: 0.07 10*3/MM3 (ref 0–0.2)
BASOPHILS NFR BLD AUTO: 1 % (ref 0–1.5)
BDY SITE: ABNORMAL
BILIRUB SERPL-MCNC: 0.7 MG/DL (ref 0–1.2)
BODY TEMPERATURE: 37 C
BUN SERPL-MCNC: 16 MG/DL (ref 8–23)
BUN/CREAT SERPL: 27.1 (ref 7–25)
CALCIUM SPEC-SCNC: 9 MG/DL (ref 8.6–10.5)
CHLORIDE SERPL-SCNC: 109 MMOL/L (ref 98–107)
CO2 BLDA-SCNC: 27.6 MMOL/L (ref 22–33)
CO2 SERPL-SCNC: 28 MMOL/L (ref 22–29)
COHGB MFR BLD: 4.4 % (ref 0–2)
CREAT SERPL-MCNC: 0.59 MG/DL (ref 0.57–1)
D-LACTATE SERPL-SCNC: 1.4 MMOL/L (ref 0.5–2)
DEPRECATED RDW RBC AUTO: 58.2 FL (ref 37–54)
EGFRCR SERPLBLD CKD-EPI 2021: 102 ML/MIN/1.73
EOSINOPHIL # BLD AUTO: 0.08 10*3/MM3 (ref 0–0.4)
EOSINOPHIL NFR BLD AUTO: 1.1 % (ref 0.3–6.2)
EPAP: 0
ERYTHROCYTE [DISTWIDTH] IN BLOOD BY AUTOMATED COUNT: 14.9 % (ref 12.3–15.4)
GLOBULIN UR ELPH-MCNC: 2.5 GM/DL
GLUCOSE BLDC GLUCOMTR-MCNC: 260 MG/DL (ref 70–130)
GLUCOSE SERPL-MCNC: 58 MG/DL (ref 65–99)
HCO3 BLDA-SCNC: 26.2 MMOL/L (ref 20–26)
HCT VFR BLD AUTO: 51.2 % (ref 34–46.6)
HCT VFR BLD CALC: 46.9 % (ref 38–51)
HGB BLD-MCNC: 16.2 G/DL (ref 12–15.9)
HGB BLDA-MCNC: 15.3 G/DL (ref 14–18)
HOLD SPECIMEN: NORMAL
IMM GRANULOCYTES # BLD AUTO: 0.01 10*3/MM3 (ref 0–0.05)
IMM GRANULOCYTES NFR BLD AUTO: 0.1 % (ref 0–0.5)
INHALED O2 CONCENTRATION: 36 %
IPAP: 0
LYMPHOCYTES # BLD AUTO: 2.08 10*3/MM3 (ref 0.7–3.1)
LYMPHOCYTES NFR BLD AUTO: 28.9 % (ref 19.6–45.3)
MCH RBC QN AUTO: 33.1 PG (ref 26.6–33)
MCHC RBC AUTO-ENTMCNC: 31.6 G/DL (ref 31.5–35.7)
MCV RBC AUTO: 104.7 FL (ref 79–97)
METHGB BLD QL: 0 % (ref 0–1.5)
MODALITY: ABNORMAL
MONOCYTES # BLD AUTO: 0.45 10*3/MM3 (ref 0.1–0.9)
MONOCYTES NFR BLD AUTO: 6.3 % (ref 5–12)
NEUTROPHILS NFR BLD AUTO: 4.51 10*3/MM3 (ref 1.7–7)
NEUTROPHILS NFR BLD AUTO: 62.6 % (ref 42.7–76)
NOTE: ABNORMAL
NRBC BLD AUTO-RTO: 0 /100 WBC (ref 0–0.2)
NT-PROBNP SERPL-MCNC: 6751 PG/ML (ref 0–900)
OXYHGB MFR BLDV: 83 % (ref 94–99)
PAW @ PEAK INSP FLOW SETTING VENT: 0 CMH2O
PCO2 BLDA: 44.8 MM HG (ref 35–45)
PCO2 TEMP ADJ BLD: 44.8 MM HG (ref 35–45)
PH BLDA: 7.38 PH UNITS (ref 7.35–7.45)
PH, TEMP CORRECTED: 7.38 PH UNITS
PLATELET # BLD AUTO: 209 10*3/MM3 (ref 140–450)
PMV BLD AUTO: 10.2 FL (ref 6–12)
PO2 BLDA: 53 MM HG (ref 83–108)
PO2 TEMP ADJ BLD: 53 MM HG (ref 83–108)
POTASSIUM SERPL-SCNC: 4.1 MMOL/L (ref 3.5–5.2)
PROCALCITONIN SERPL-MCNC: 0.02 NG/ML (ref 0–0.25)
PROT SERPL-MCNC: 6.6 G/DL (ref 6–8.5)
RBC # BLD AUTO: 4.89 10*6/MM3 (ref 3.77–5.28)
SODIUM SERPL-SCNC: 145 MMOL/L (ref 136–145)
TOTAL RATE: 0 BREATHS/MINUTE
TROPONIN T SERPL HS-MCNC: 18 NG/L
WBC NRBC COR # BLD: 7.2 10*3/MM3 (ref 3.4–10.8)
WHOLE BLOOD HOLD COAG: NORMAL
WHOLE BLOOD HOLD SPECIMEN: NORMAL

## 2023-07-17 PROCEDURE — 25010000002 CEFTRIAXONE PER 250 MG: Performed by: NURSE PRACTITIONER

## 2023-07-17 PROCEDURE — 94799 UNLISTED PULMONARY SVC/PX: CPT

## 2023-07-17 PROCEDURE — 36415 COLL VENOUS BLD VENIPUNCTURE: CPT

## 2023-07-17 PROCEDURE — 84484 ASSAY OF TROPONIN QUANT: CPT | Performed by: EMERGENCY MEDICINE

## 2023-07-17 PROCEDURE — 82375 ASSAY CARBOXYHB QUANT: CPT

## 2023-07-17 PROCEDURE — 25010000002 METHYLPREDNISOLONE PER 125 MG: Performed by: NURSE PRACTITIONER

## 2023-07-17 PROCEDURE — 83605 ASSAY OF LACTIC ACID: CPT | Performed by: NURSE PRACTITIONER

## 2023-07-17 PROCEDURE — 71045 X-RAY EXAM CHEST 1 VIEW: CPT

## 2023-07-17 PROCEDURE — 25010000002 AZITHROMYCIN PER 500 MG: Performed by: NURSE PRACTITIONER

## 2023-07-17 PROCEDURE — 82805 BLOOD GASES W/O2 SATURATION: CPT

## 2023-07-17 PROCEDURE — 71275 CT ANGIOGRAPHY CHEST: CPT

## 2023-07-17 PROCEDURE — 25510000001 IOPAMIDOL PER 1 ML: Performed by: EMERGENCY MEDICINE

## 2023-07-17 PROCEDURE — 83880 ASSAY OF NATRIURETIC PEPTIDE: CPT | Performed by: EMERGENCY MEDICINE

## 2023-07-17 PROCEDURE — 83050 HGB METHEMOGLOBIN QUAN: CPT

## 2023-07-17 PROCEDURE — 82948 REAGENT STRIP/BLOOD GLUCOSE: CPT

## 2023-07-17 PROCEDURE — 84145 PROCALCITONIN (PCT): CPT | Performed by: PHYSICIAN ASSISTANT

## 2023-07-17 PROCEDURE — 87040 BLOOD CULTURE FOR BACTERIA: CPT | Performed by: NURSE PRACTITIONER

## 2023-07-17 PROCEDURE — 36600 WITHDRAWAL OF ARTERIAL BLOOD: CPT

## 2023-07-17 PROCEDURE — 85025 COMPLETE CBC W/AUTO DIFF WBC: CPT | Performed by: EMERGENCY MEDICINE

## 2023-07-17 PROCEDURE — 94761 N-INVAS EAR/PLS OXIMETRY MLT: CPT

## 2023-07-17 PROCEDURE — 93005 ELECTROCARDIOGRAM TRACING: CPT | Performed by: EMERGENCY MEDICINE

## 2023-07-17 PROCEDURE — 0202U NFCT DS 22 TRGT SARS-COV-2: CPT | Performed by: INTERNAL MEDICINE

## 2023-07-17 PROCEDURE — 94640 AIRWAY INHALATION TREATMENT: CPT

## 2023-07-17 PROCEDURE — 80053 COMPREHEN METABOLIC PANEL: CPT | Performed by: EMERGENCY MEDICINE

## 2023-07-17 PROCEDURE — 99223 1ST HOSP IP/OBS HIGH 75: CPT | Performed by: PHYSICIAN ASSISTANT

## 2023-07-17 PROCEDURE — 93005 ELECTROCARDIOGRAM TRACING: CPT

## 2023-07-17 PROCEDURE — 87641 MR-STAPH DNA AMP PROBE: CPT | Performed by: PHYSICIAN ASSISTANT

## 2023-07-17 PROCEDURE — 99285 EMERGENCY DEPT VISIT HI MDM: CPT

## 2023-07-17 RX ORDER — METHYLPREDNISOLONE SODIUM SUCCINATE 125 MG/2ML
125 INJECTION, POWDER, LYOPHILIZED, FOR SOLUTION INTRAMUSCULAR; INTRAVENOUS ONCE
Status: COMPLETED | OUTPATIENT
Start: 2023-07-17 | End: 2023-07-17

## 2023-07-17 RX ORDER — SODIUM CHLORIDE 9 MG/ML
40 INJECTION, SOLUTION INTRAVENOUS AS NEEDED
Status: DISCONTINUED | OUTPATIENT
Start: 2023-07-17 | End: 2023-07-23 | Stop reason: HOSPADM

## 2023-07-17 RX ORDER — ONDANSETRON 4 MG/1
4 TABLET, FILM COATED ORAL EVERY 6 HOURS PRN
Status: DISCONTINUED | OUTPATIENT
Start: 2023-07-17 | End: 2023-07-23 | Stop reason: HOSPADM

## 2023-07-17 RX ORDER — IPRATROPIUM BROMIDE AND ALBUTEROL SULFATE 2.5; .5 MG/3ML; MG/3ML
3 SOLUTION RESPIRATORY (INHALATION)
Status: DISCONTINUED | OUTPATIENT
Start: 2023-07-17 | End: 2023-07-23 | Stop reason: HOSPADM

## 2023-07-17 RX ORDER — DEXTROMETHORPHAN POLISTIREX 30 MG/5ML
60 SUSPENSION ORAL EVERY 12 HOURS PRN
Status: DISCONTINUED | OUTPATIENT
Start: 2023-07-17 | End: 2023-07-23 | Stop reason: HOSPADM

## 2023-07-17 RX ORDER — SODIUM CHLORIDE 0.9 % (FLUSH) 0.9 %
10 SYRINGE (ML) INJECTION AS NEEDED
Status: DISCONTINUED | OUTPATIENT
Start: 2023-07-17 | End: 2023-07-23 | Stop reason: HOSPADM

## 2023-07-17 RX ORDER — IPRATROPIUM BROMIDE AND ALBUTEROL SULFATE 2.5; .5 MG/3ML; MG/3ML
3 SOLUTION RESPIRATORY (INHALATION) ONCE
Status: COMPLETED | OUTPATIENT
Start: 2023-07-17 | End: 2023-07-17

## 2023-07-17 RX ORDER — GUAIFENESIN 600 MG/1
1200 TABLET, EXTENDED RELEASE ORAL EVERY 12 HOURS SCHEDULED
Status: DISCONTINUED | OUTPATIENT
Start: 2023-07-17 | End: 2023-07-23 | Stop reason: HOSPADM

## 2023-07-17 RX ORDER — ONDANSETRON 2 MG/ML
4 INJECTION INTRAMUSCULAR; INTRAVENOUS EVERY 6 HOURS PRN
Status: DISCONTINUED | OUTPATIENT
Start: 2023-07-17 | End: 2023-07-23 | Stop reason: HOSPADM

## 2023-07-17 RX ORDER — SODIUM CHLORIDE, SODIUM LACTATE, POTASSIUM CHLORIDE, CALCIUM CHLORIDE 600; 310; 30; 20 MG/100ML; MG/100ML; MG/100ML; MG/100ML
75 INJECTION, SOLUTION INTRAVENOUS CONTINUOUS
Status: DISCONTINUED | OUTPATIENT
Start: 2023-07-17 | End: 2023-07-18

## 2023-07-17 RX ORDER — CALCIUM CARBONATE 500 MG/1
2 TABLET, CHEWABLE ORAL 2 TIMES DAILY PRN
Status: DISCONTINUED | OUTPATIENT
Start: 2023-07-17 | End: 2023-07-23 | Stop reason: HOSPADM

## 2023-07-17 RX ORDER — ACETAMINOPHEN 325 MG/1
650 TABLET ORAL EVERY 4 HOURS PRN
Status: DISCONTINUED | OUTPATIENT
Start: 2023-07-17 | End: 2023-07-23 | Stop reason: HOSPADM

## 2023-07-17 RX ORDER — SODIUM CHLORIDE 0.9 % (FLUSH) 0.9 %
10 SYRINGE (ML) INJECTION EVERY 12 HOURS SCHEDULED
Status: DISCONTINUED | OUTPATIENT
Start: 2023-07-17 | End: 2023-07-23 | Stop reason: HOSPADM

## 2023-07-17 RX ORDER — CHOLECALCIFEROL (VITAMIN D3) 125 MCG
5 CAPSULE ORAL NIGHTLY PRN
Status: DISCONTINUED | OUTPATIENT
Start: 2023-07-17 | End: 2023-07-23 | Stop reason: HOSPADM

## 2023-07-17 RX ADMIN — DOXYCYCLINE 100 MG: 100 INJECTION, POWDER, LYOPHILIZED, FOR SOLUTION INTRAVENOUS at 23:28

## 2023-07-17 RX ADMIN — SODIUM CHLORIDE 1000 MG: 900 INJECTION INTRAVENOUS at 20:13

## 2023-07-17 RX ADMIN — SODIUM CHLORIDE, POTASSIUM CHLORIDE, SODIUM LACTATE AND CALCIUM CHLORIDE 75 ML/HR: 600; 310; 30; 20 INJECTION, SOLUTION INTRAVENOUS at 23:27

## 2023-07-17 RX ADMIN — GUAIFENESIN 1200 MG: 600 TABLET, EXTENDED RELEASE ORAL at 23:26

## 2023-07-17 RX ADMIN — AZITHROMYCIN 500 MG: 500 INJECTION, POWDER, LYOPHILIZED, FOR SOLUTION INTRAVENOUS at 21:06

## 2023-07-17 RX ADMIN — IPRATROPIUM BROMIDE AND ALBUTEROL SULFATE 3 ML: .5; 2.5 SOLUTION RESPIRATORY (INHALATION) at 23:16

## 2023-07-17 RX ADMIN — IPRATROPIUM BROMIDE AND ALBUTEROL SULFATE 3 ML: 2.5; .5 SOLUTION RESPIRATORY (INHALATION) at 17:14

## 2023-07-17 RX ADMIN — METHYLPREDNISOLONE SODIUM SUCCINATE 125 MG: 125 INJECTION, POWDER, FOR SOLUTION INTRAMUSCULAR; INTRAVENOUS at 17:07

## 2023-07-17 RX ADMIN — IOPAMIDOL 70 ML: 755 INJECTION, SOLUTION INTRAVENOUS at 19:08

## 2023-07-17 NOTE — Clinical Note
Level of Care: Telemetry [5]   Diagnosis: COPD exacerbation [863513]   Admitting Physician: ANAND COLUNGA [[id number]]   Attending Physician: ANAND COLUNGA [[id number]]   Certification: I Certify That Inpatient Hospital Services Are Medically Necessary For Greater Than 2 Midnights

## 2023-07-17 NOTE — ED PROVIDER NOTES
EMERGENCY DEPARTMENT ENCOUNTER    Pt Name: Mary West  MRN: [id number]  Pt :   1960  Room Number:  SONI/SONI  Date of encounter:  2023  PCP: Provider, No Known  ED Provider: RAFA Gates    Historian: Patient    HPI:  Chief Complaint: soa, rt rib pain    Context: Mary West is a 62 y.o. female who presents to the ED c/o SOA, Rt rib pain.  Patient has a history of COPD.  Patient explains that she developed COVID last year during her inpatient stay she was diagnosed with COPD and was sent home with oxygen.  Patient advises she has been using that oxygen intermittently since.  Patient recently ran out of the oxygen.  She advises that her shortness of breath is getting worse at this time. Pt c/o cough, congestion. Pt reports that she has pain in her rt ribs.  Patient denies actual chest pain.  She complains of nausea but denies any vomiting.  She denies fever and chills.  Patient does report weight loss.  Patient does smoke half pack per day.  Patient advises that she does not have a primary care physician.  She explains that she has not had a doctor in over 3 years.  HPI     REVIEW OF SYSTEMS  A chief complaint appropriate review of systems was completed and is negative except as noted in the HPI.     PAST MEDICAL HISTORY  Past Medical History:   Diagnosis Date    Alcohol abuse     Quit early     COPD (chronic obstructive pulmonary disease)     Pancreatitis        PAST SURGICAL HISTORY  No past surgical history on file.    FAMILY HISTORY  Family History   Problem Relation Age of Onset    Heart failure Mother     Stroke Mother     Stroke Father        SOCIAL HISTORY  Social History     Socioeconomic History    Marital status:    Tobacco Use    Smoking status: Every Day     Packs/day: 0.50     Years: 40.00     Pack years: 20.00     Types: Cigarettes    Smokeless tobacco: Never   Vaping Use    Vaping Use: Never used   Substance and Sexual Activity    Alcohol use: Never    Drug  use: Defer    Sexual activity: Defer       ALLERGIES  Methadone    PHYSICAL EXAM  Physical Exam  Vitals and nursing note reviewed.   Constitutional:       General: She is in acute distress.      Appearance: She is well-developed. She is ill-appearing. She is not toxic-appearing.   HENT:      Head: Normocephalic and atraumatic.      Mouth/Throat:      Mouth: Mucous membranes are moist.   Eyes:      Extraocular Movements: Extraocular movements intact.   Cardiovascular:      Rate and Rhythm: Regular rhythm.   Pulmonary:      Breath sounds: Wheezing and rhonchi present.   Musculoskeletal:      Cervical back: Normal range of motion.      Right lower leg: No tenderness. No edema.      Left lower leg: No tenderness. No edema.   Skin:     General: Skin is warm and dry.   Neurological:      Mental Status: She is alert and oriented to person, place, and time.   Psychiatric:         Mood and Affect: Mood normal.         Behavior: Behavior normal.         LAB RESULTS  Results for orders placed or performed during the hospital encounter of 07/17/23   Comprehensive Metabolic Panel    Specimen: Blood   Result Value Ref Range    Glucose 58 (L) 65 - 99 mg/dL    BUN 16 8 - 23 mg/dL    Creatinine 0.59 0.57 - 1.00 mg/dL    Sodium 145 136 - 145 mmol/L    Potassium 4.1 3.5 - 5.2 mmol/L    Chloride 109 (H) 98 - 107 mmol/L    CO2 28.0 22.0 - 29.0 mmol/L    Calcium 9.0 8.6 - 10.5 mg/dL    Total Protein 6.6 6.0 - 8.5 g/dL    Albumin 4.1 3.5 - 5.2 g/dL    ALT (SGPT) 12 1 - 33 U/L    AST (SGOT) 23 1 - 32 U/L    Alkaline Phosphatase 109 39 - 117 U/L    Total Bilirubin 0.7 0.0 - 1.2 mg/dL    Globulin 2.5 gm/dL    A/G Ratio 1.6 g/dL    BUN/Creatinine Ratio 27.1 (H) 7.0 - 25.0    Anion Gap 8.0 5.0 - 15.0 mmol/L    eGFR 102.0 >60.0 mL/min/1.73   BNP    Specimen: Blood   Result Value Ref Range    proBNP 6,751.0 (H) 0.0 - 900.0 pg/mL   Single High Sensitivity Troponin T    Specimen: Blood   Result Value Ref Range    HS Troponin T 18 (H) <10 ng/L    CBC Auto Differential    Specimen: Blood   Result Value Ref Range    WBC 7.20 3.40 - 10.80 10*3/mm3    RBC 4.89 3.77 - 5.28 10*6/mm3    Hemoglobin 16.2 (H) 12.0 - 15.9 g/dL    Hematocrit 51.2 (H) 34.0 - 46.6 %    .7 (H) 79.0 - 97.0 fL    MCH 33.1 (H) 26.6 - 33.0 pg    MCHC 31.6 31.5 - 35.7 g/dL    RDW 14.9 12.3 - 15.4 %    RDW-SD 58.2 (H) 37.0 - 54.0 fl    MPV 10.2 6.0 - 12.0 fL    Platelets 209 140 - 450 10*3/mm3    Neutrophil % 62.6 42.7 - 76.0 %    Lymphocyte % 28.9 19.6 - 45.3 %    Monocyte % 6.3 5.0 - 12.0 %    Eosinophil % 1.1 0.3 - 6.2 %    Basophil % 1.0 0.0 - 1.5 %    Immature Grans % 0.1 0.0 - 0.5 %    Neutrophils, Absolute 4.51 1.70 - 7.00 10*3/mm3    Lymphocytes, Absolute 2.08 0.70 - 3.10 10*3/mm3    Monocytes, Absolute 0.45 0.10 - 0.90 10*3/mm3    Eosinophils, Absolute 0.08 0.00 - 0.40 10*3/mm3    Basophils, Absolute 0.07 0.00 - 0.20 10*3/mm3    Immature Grans, Absolute 0.01 0.00 - 0.05 10*3/mm3    nRBC 0.0 0.0 - 0.2 /100 WBC   Lactic Acid, Plasma    Specimen: Blood   Result Value Ref Range    Lactate 1.4 0.5 - 2.0 mmol/L   Blood Gas, Arterial With Co-Ox    Specimen: Arterial Blood   Result Value Ref Range    Site Right Radial     Collin's Test N/A     pH, Arterial 7.376 7.350 - 7.450 pH units    pCO2, Arterial 44.8 35.0 - 45.0 mm Hg    pO2, Arterial 53.0 (L) 83.0 - 108.0 mm Hg    HCO3, Arterial 26.2 (H) 20.0 - 26.0 mmol/L    Base Excess, Arterial 0.6 0.0 - 2.0 mmol/L    Hemoglobin, Blood Gas 15.3 14 - 18 g/dL    Hematocrit, Blood Gas 46.9 38.0 - 51.0 %    Oxyhemoglobin 83.0 (L) 94 - 99 %    Methemoglobin 0.00 0.00 - 1.50 %    Carboxyhemoglobin 4.4 (H) 0 - 2 %    CO2 Content 27.6 22 - 33 mmol/L    Temperature 37.0 C    Barometric Pressure for Blood Gas      Modality Nasal Cannula     FIO2 36 %    Rate 0 Breaths/minute    PIP 0 cmH2O    IPAP 0     EPAP 0     Note      pH, Temp Corrected 7.376 pH Units    pCO2, Temperature Corrected 44.8 35 - 45 mm Hg    pO2, Temperature Corrected 53.0 (L) 83 -  108 mm Hg   POC Glucose Once    Specimen: Blood   Result Value Ref Range    Glucose 260 (H) 70 - 130 mg/dL   ECG 12 Lead ED Triage Standing Order; SOA   Result Value Ref Range    QT Interval 394 ms    QTC Interval 434 ms   Green Top (Gel)   Result Value Ref Range    Extra Tube Hold for add-ons.    Lavender Top   Result Value Ref Range    Extra Tube hold for add-on    Gold Top - SST   Result Value Ref Range    Extra Tube Hold for add-ons.    Gray Top   Result Value Ref Range    Extra Tube Hold for add-ons.    Light Blue Top   Result Value Ref Range    Extra Tube Hold for add-ons.        If labs were ordered, I independently reviewed the results and considered them in treating the patient.    RADIOLOGY  CT Angiogram Chest   Final Result   Impression:   1.Negative for pulmonary embolus.   2.Small consolidation within left lower lobe, likely a mild infectious or inflammatory process.   3.Advanced emphysema.   4.Cardiomegaly.            Electronically Signed: Alton Coffman     7/17/2023 7:35 PM EDT     Workstation ID: [id number]      XR Chest 1 View   Final Result   Impression:   Advanced emphysema and mildly enlarged layering right-sided pleural effusion.         Electronically Signed: Broderick Reynoso     7/17/2023 4:46 PM EDT     Workstation ID: [id number]        [x] Radiologist's Report Reviewed:  I ordered and independently reviewed the above noted radiographic studies.  See radiologist's dictation for official interpretation.      PROCEDURES    Procedures    ECG 12 Lead ED Triage Standing Order; SOA   Preliminary Result   Test Reason : ED Triage Standing Order~   Blood Pressure :   */*   mmHG   Vent. Rate :  73 BPM     Atrial Rate :  73 BPM      P-R Int : 114 ms          QRS Dur :  88 ms       QT Int : 394 ms       P-R-T Axes :  73 112  29 degrees      QTc Int : 434 ms      Normal sinus rhythm   Possible Left atrial enlargement   Right axis deviation   Right ventricular hypertrophy   Abnormal ECG   When compared with  ECG of 31-JAN-2022 12:29,   Nonspecific T wave abnormality now evident in Inferior leads      Referred By: EDMD           Confirmed By:           MEDICATIONS GIVEN IN ER    Medications   sodium chloride 0.9 % flush 10 mL (has no administration in time range)   AZITHROMYCIN 500 MG/250 ML 0.9% NS IVPB (vial-mate) (500 mg Intravenous New Bag 7/17/23 2106)   methylPREDNISolone sodium succinate (SOLU-Medrol) injection 125 mg (125 mg Intravenous Given 7/17/23 1707)   ipratropium-albuterol (DUO-NEB) nebulizer solution 3 mL (3 mL Nebulization Given 7/17/23 1714)   iopamidol (ISOVUE-370) 76 % injection 100 mL (70 mL Intravenous Given 7/17/23 1908)   cefTRIAXone (ROCEPHIN) 1000 mg/100 mL 0.9% NS (MBP) (0 mg Intravenous Stopped 7/17/23 2050)       MEDICAL DECISION MAKING, PROGRESS, and CONSULTS   Medical Decision Making  Mary West is a 62 y.o. female who presents to the ED c/o SOA, Rt rib pain.  Patient has a history of COPD.  Patient explains that she developed COVID last year during her inpatient stay she was diagnosed with COPD and was sent home with oxygen.  Patient advises she has been using that oxygen intermittently since.  Patient recently ran out of the oxygen.  She advises that her shortness of breath is getting worse at this time. Pt c/o cough, congestion. Pt reports that she has pain in her rt ribs.  Patient denies actual chest pain.  She complains of nausea but denies any vomiting.  She denies fever and chills.  Patient does report weight loss.  Patient does smoke half pack per day.  Patient advises that she does not have a primary care physician.  She explains that she has not had a doctor in over 3 years.      Problems Addressed:  COPD with acute exacerbation: acute illness or injury     Details: Patient has a history of COPD patient has acute exacerbation.  Patient requires oxygen however has been out of her oxygen.  Hypoxia: acute illness or injury     Details: Room air initial sat was 82%.  Pneumonia of  left lower lobe due to infectious organism: acute illness or injury     Details: CTA of the chest revealed a left lower lobe pneumonia.  Patient was started on IV antibiotics.  Tobacco abuse: chronic illness or injury     Details: Patient advises that she currently smokes half pack per day.    Amount and/or Complexity of Data Reviewed  Labs: ordered. Decision-making details documented in ED Course.  Radiology: ordered and independent interpretation performed. Decision-making details documented in ED Course.  ECG/medicine tests: ordered. Decision-making details documented in ED Course.    Risk  Prescription drug management.  Decision regarding hospitalization.        All labs have been independently reviewed by me.  All radiology studies have been reviewed by me and the radiologist dictating the report.  All EKG's have been independently viewed by me.    [] Discussed with radiology regarding test interpretation:    Discussion below represents my analysis of pertinent findings related to patient's condition, differential diagnosis, treatment plan and final disposition.    Differential diagnosis:  The differential diagnosis associated with the patient's presentation includes: COPD exacerbation, hypoxia, pneumonia, PE    Additional sources  Discussed/ obtained information from independent historians:   [] Spouse  [] Parent  [] Family member  [] Friend  [] EMS   [] Other:  External (non-ED) record review:   [x] Inpatient record:   [] Office record:   [x] Outpatient record:   [x] Prior Outpatient labs:   [x] Prior Outpatient radiology:   [] Primary Care record:   [] Outside ED record:   [] Other:   Patient's care impacted by:   [] Diabetes  [] Hypertension  [] Hyperlipidemia  [] Hypothyroidism   [] Coronary Artery Disease   [x] COPD   [] Cancer   [] Obesity  [] GERD   [x] Tobacco Abuse   [] Substance Abuse    [] Anxiety   [] Depression   [] Other:   Care significantly affected by Social Determinants of Health (housing and  economic circumstances, unemployment)    [] Yes     [x] No   If yes, Patient's care significantly limited by  Social Determinants of Health including:   [] Inadequate housing   [] Low income   [] Alcoholism and drug addiction in family   [] Problems related to primary support group   [] Unemployment   [] Problems related to employment   [] Other Social Determinants of Health:     Shared decision making: Shared decision making with patient.  Patient was hypoxic when she came in today.  Patient has not had a regular PCP in over 3 years.  Patient has COPD.  Patient has been using oxygen that was supplied to her following a stent of COVID a year ago.  Reviewed it reveals a pneumonia.  Patient will be started on IV antibiotics.  Patient will be admitted to hospitalist.    Orders placed during this visit:  Orders Placed This Encounter   Procedures    Blood Culture - Blood,    Blood Culture - Blood,    Respiratory Panel PCR w/COVID-19(SARS-CoV-2) JONAS/RINA/NAFISA/[facility] In-House, NP Swab in UTM/VTM, 3-4 HR TAT - Swab, Nasopharynx    XR Chest 1 View    CT Angiogram Chest    [place] Draw    Comprehensive Metabolic Panel    BNP    Single High Sensitivity Troponin T    CBC Auto Differential    Lactic Acid, Plasma    Blood Gas, Arterial -With Co-Ox Panel: Yes    Blood Gas, Arterial With Co-Ox    NPO Diet NPO Type: Strict NPO    Undress & Gown    Continuous Pulse Oximetry    Vital Signs    Code Status and Medical Interventions:    Oxygen Therapy- Nasal Cannula; Titrate 1-6 LPM Per SpO2; 90 - 95%    POC Glucose Finger Once    POC Glucose Once    ECG 12 Lead ED Triage Standing Order; SOA    Insert Peripheral IV    Inpatient Admission    CBC & Differential    Green Top (Gel)    Lavender Top    Gold Top - SST    Gray Top    Light Blue Top       I considered prescription management  with:   [] Pain medication  [] Antiviral  [] Antibiotic   [] Other:   Rationale:  Additional orders considered but not ordered:  The following  testing was considered but ultimately not selected after discussion with patient/family:    ED Course:    ED Course as of 07/17/23 2204 Mon Jul 17, 2023   1550 SpO2(!): 82 %  Oxygen saturation on arrival RA.  [KG]   1550 SpO2(!): 88 % [KG]   1715 WBC: 7.20  White blood cell count is within normal limits.  Hemoglobin 16.2, hematocrit 51.2.  Patient's initial troponin is 18.  Patient's lactate is 1.4. [KG]   1941 HS Troponin T(!): 18 [KG]   2000 Results are discussed with the patient at this time.  We will admit the patient for pneumonia.  We will admit her for COPD exacerbation, hypoxia.  Patient will be started on IV antibiotics.  Hospitalist contacted. [KG]   2030 SpO2(!): 74 %  Patient was removed from the oxygen.  Room air sat 74%.  ABG pending. [KG]   2151 Glucose(!): 260 [KG]   2155 ECG 12 Lead ED Triage Standing Order; SOA [KG]      ED Course User Index  [KG] Tracie Schaefer APRN            DIAGNOSIS  Final diagnoses:   COPD with acute exacerbation   Hypoxia   Pneumonia of left lower lobe due to infectious organism   Tobacco abuse       DISPOSITION    ED Disposition       ED Disposition   Decision to Admit    Condition   --    Comment   Level of Care: Telemetry [5]   Diagnosis: Acute exacerbation of chronic obstructive pulmonary disease (COPD) [544646]   Admitting Physician: LYNDA JOSEPH III [[id number]]   Attending Physician: LYNDA JOSEPH III [[id number]]   Isolate for COVID?: No [0]   Certification: I Certify That Inpatient Hospital Services Are Medically Necessary For Greater Than 2 Midnights                 Please note that portions of this document were completed with voice recognition software.       Tracie Schaefer APRN  07/17/23 2205

## 2023-07-18 LAB
ANION GAP SERPL CALCULATED.3IONS-SCNC: 10 MMOL/L (ref 5–15)
B PARAPERT DNA SPEC QL NAA+PROBE: NOT DETECTED
B PERT DNA SPEC QL NAA+PROBE: NOT DETECTED
BASOPHILS # BLD AUTO: 0.02 10*3/MM3 (ref 0–0.2)
BASOPHILS NFR BLD AUTO: 0.4 % (ref 0–1.5)
BUN SERPL-MCNC: 17 MG/DL (ref 8–23)
BUN/CREAT SERPL: 29.8 (ref 7–25)
C PNEUM DNA NPH QL NAA+NON-PROBE: NOT DETECTED
CALCIUM SPEC-SCNC: 8.7 MG/DL (ref 8.6–10.5)
CHLORIDE SERPL-SCNC: 107 MMOL/L (ref 98–107)
CHOLEST SERPL-MCNC: 108 MG/DL (ref 0–200)
CO2 SERPL-SCNC: 24 MMOL/L (ref 22–29)
CREAT SERPL-MCNC: 0.57 MG/DL (ref 0.57–1)
DEPRECATED RDW RBC AUTO: 62.2 FL (ref 37–54)
EGFRCR SERPLBLD CKD-EPI 2021: 102.9 ML/MIN/1.73
EOSINOPHIL # BLD AUTO: 0.01 10*3/MM3 (ref 0–0.4)
EOSINOPHIL NFR BLD AUTO: 0.2 % (ref 0.3–6.2)
ERYTHROCYTE [DISTWIDTH] IN BLOOD BY AUTOMATED COUNT: 15.3 % (ref 12.3–15.4)
FLUAV SUBTYP SPEC NAA+PROBE: NOT DETECTED
FLUBV RNA ISLT QL NAA+PROBE: NOT DETECTED
GLUCOSE SERPL-MCNC: 129 MG/DL (ref 65–99)
HADV DNA SPEC NAA+PROBE: NOT DETECTED
HCOV 229E RNA SPEC QL NAA+PROBE: NOT DETECTED
HCOV HKU1 RNA SPEC QL NAA+PROBE: NOT DETECTED
HCOV NL63 RNA SPEC QL NAA+PROBE: NOT DETECTED
HCOV OC43 RNA SPEC QL NAA+PROBE: NOT DETECTED
HCT VFR BLD AUTO: 47 % (ref 34–46.6)
HDLC SERPL-MCNC: 37 MG/DL (ref 40–60)
HGB BLD-MCNC: 14.4 G/DL (ref 12–15.9)
HMPV RNA NPH QL NAA+NON-PROBE: NOT DETECTED
HPIV1 RNA ISLT QL NAA+PROBE: NOT DETECTED
HPIV2 RNA SPEC QL NAA+PROBE: NOT DETECTED
HPIV3 RNA NPH QL NAA+PROBE: NOT DETECTED
HPIV4 P GENE NPH QL NAA+PROBE: NOT DETECTED
IMM GRANULOCYTES # BLD AUTO: 0.05 10*3/MM3 (ref 0–0.05)
IMM GRANULOCYTES NFR BLD AUTO: 1.1 % (ref 0–0.5)
L PNEUMO1 AG UR QL IA: NEGATIVE
LDLC SERPL CALC-MCNC: 58 MG/DL (ref 0–100)
LDLC/HDLC SERPL: 1.63 {RATIO}
LYMPHOCYTES # BLD AUTO: 0.73 10*3/MM3 (ref 0.7–3.1)
LYMPHOCYTES NFR BLD AUTO: 16.1 % (ref 19.6–45.3)
M PNEUMO IGG SER IA-ACNC: NOT DETECTED
MCH RBC QN AUTO: 33.2 PG (ref 26.6–33)
MCHC RBC AUTO-ENTMCNC: 30.6 G/DL (ref 31.5–35.7)
MCV RBC AUTO: 108.3 FL (ref 79–97)
MONOCYTES # BLD AUTO: 0.11 10*3/MM3 (ref 0.1–0.9)
MONOCYTES NFR BLD AUTO: 2.4 % (ref 5–12)
MRSA DNA SPEC QL NAA+PROBE: NEGATIVE
NEUTROPHILS NFR BLD AUTO: 3.62 10*3/MM3 (ref 1.7–7)
NEUTROPHILS NFR BLD AUTO: 79.8 % (ref 42.7–76)
NRBC BLD AUTO-RTO: 0.4 /100 WBC (ref 0–0.2)
PLATELET # BLD AUTO: 179 10*3/MM3 (ref 140–450)
PMV BLD AUTO: 12.2 FL (ref 6–12)
POTASSIUM SERPL-SCNC: 4.6 MMOL/L (ref 3.5–5.2)
RBC # BLD AUTO: 4.34 10*6/MM3 (ref 3.77–5.28)
RHINOVIRUS RNA SPEC NAA+PROBE: NOT DETECTED
RSV RNA NPH QL NAA+NON-PROBE: NOT DETECTED
S PNEUM AG SPEC QL LA: NEGATIVE
SARS-COV-2 RNA NPH QL NAA+NON-PROBE: NOT DETECTED
SODIUM SERPL-SCNC: 141 MMOL/L (ref 136–145)
TRIGL SERPL-MCNC: 54 MG/DL (ref 0–150)
VLDLC SERPL-MCNC: 13 MG/DL (ref 5–40)
WBC NRBC COR # BLD: 4.54 10*3/MM3 (ref 3.4–10.8)

## 2023-07-18 PROCEDURE — 94799 UNLISTED PULMONARY SVC/PX: CPT

## 2023-07-18 PROCEDURE — 80061 LIPID PANEL: CPT | Performed by: PHYSICIAN ASSISTANT

## 2023-07-18 PROCEDURE — 25010000002 CEFTRIAXONE PER 250 MG: Performed by: PHYSICIAN ASSISTANT

## 2023-07-18 PROCEDURE — 87449 NOS EACH ORGANISM AG IA: CPT | Performed by: PHYSICIAN ASSISTANT

## 2023-07-18 PROCEDURE — 87899 AGENT NOS ASSAY W/OPTIC: CPT | Performed by: PHYSICIAN ASSISTANT

## 2023-07-18 PROCEDURE — 94664 DEMO&/EVAL PT USE INHALER: CPT

## 2023-07-18 PROCEDURE — 80048 BASIC METABOLIC PNL TOTAL CA: CPT | Performed by: PHYSICIAN ASSISTANT

## 2023-07-18 PROCEDURE — 99233 SBSQ HOSP IP/OBS HIGH 50: CPT | Performed by: INTERNAL MEDICINE

## 2023-07-18 PROCEDURE — 85025 COMPLETE CBC W/AUTO DIFF WBC: CPT | Performed by: PHYSICIAN ASSISTANT

## 2023-07-18 RX ADMIN — IPRATROPIUM BROMIDE AND ALBUTEROL SULFATE 3 ML: .5; 2.5 SOLUTION RESPIRATORY (INHALATION) at 23:07

## 2023-07-18 RX ADMIN — DOXYCYCLINE 100 MG: 100 INJECTION, POWDER, LYOPHILIZED, FOR SOLUTION INTRAVENOUS at 23:48

## 2023-07-18 RX ADMIN — Medication 10 ML: at 08:13

## 2023-07-18 RX ADMIN — IPRATROPIUM BROMIDE AND ALBUTEROL SULFATE 3 ML: .5; 2.5 SOLUTION RESPIRATORY (INHALATION) at 19:14

## 2023-07-18 RX ADMIN — IPRATROPIUM BROMIDE AND ALBUTEROL SULFATE 3 ML: .5; 2.5 SOLUTION RESPIRATORY (INHALATION) at 07:32

## 2023-07-18 RX ADMIN — PANCRELIPASE 36000 UNITS OF LIPASE: 60000; 12000; 38000 CAPSULE, DELAYED RELEASE PELLETS ORAL at 11:22

## 2023-07-18 RX ADMIN — PANCRELIPASE 36000 UNITS OF LIPASE: 60000; 12000; 38000 CAPSULE, DELAYED RELEASE PELLETS ORAL at 17:18

## 2023-07-18 RX ADMIN — GUAIFENESIN 1200 MG: 600 TABLET, EXTENDED RELEASE ORAL at 08:13

## 2023-07-18 RX ADMIN — PANCRELIPASE 36000 UNITS OF LIPASE: 60000; 12000; 38000 CAPSULE, DELAYED RELEASE PELLETS ORAL at 08:13

## 2023-07-18 RX ADMIN — IPRATROPIUM BROMIDE AND ALBUTEROL SULFATE 3 ML: .5; 2.5 SOLUTION RESPIRATORY (INHALATION) at 15:42

## 2023-07-18 RX ADMIN — SODIUM CHLORIDE 1000 MG: 900 INJECTION INTRAVENOUS at 20:05

## 2023-07-18 RX ADMIN — IPRATROPIUM BROMIDE AND ALBUTEROL SULFATE 3 ML: .5; 2.5 SOLUTION RESPIRATORY (INHALATION) at 12:19

## 2023-07-18 RX ADMIN — ACETAMINOPHEN 650 MG: 325 TABLET ORAL at 23:48

## 2023-07-18 RX ADMIN — DOXYCYCLINE 100 MG: 100 INJECTION, POWDER, LYOPHILIZED, FOR SOLUTION INTRAVENOUS at 11:23

## 2023-07-18 RX ADMIN — GUAIFENESIN 1200 MG: 600 TABLET, EXTENDED RELEASE ORAL at 20:05

## 2023-07-18 NOTE — PLAN OF CARE
Problem: Adult Inpatient Plan of Care  Goal: Plan of Care Review  Outcome: Ongoing, Not Progressing  Flowsheets (Taken 7/18/2023 0426)  Progress: no change  Plan of Care Reviewed With: patient  Outcome Evaluation: VSS. 4L NC. NSR on the monitor. A&O x4. Pt ambulating to the bathroom with standby due to occasional dizziness. Pt reports SOA with exertion. No complaints of pain. Pt is resting comfortably at this time. No further complaints.  Goal: Patient-Specific Goal (Individualized)  Outcome: Ongoing, Not Progressing  Goal: Absence of Hospital-Acquired Illness or Injury  Outcome: Ongoing, Not Progressing  Intervention: Identify and Manage Fall Risk  Recent Flowsheet Documentation  Taken 7/18/2023 0400 by David Nieves RN  Safety Promotion/Fall Prevention:   activity supervised   assistive device/personal items within reach   clutter free environment maintained   safety round/check completed   room organization consistent   nonskid shoes/slippers when out of bed  Taken 7/18/2023 0200 by David Nieves RN  Safety Promotion/Fall Prevention:   activity supervised   assistive device/personal items within reach   clutter free environment maintained   safety round/check completed   room organization consistent   nonskid shoes/slippers when out of bed  Taken 7/18/2023 0000 by David Nieves RN  Safety Promotion/Fall Prevention:   activity supervised   assistive device/personal items within reach   clutter free environment maintained   safety round/check completed   room organization consistent   nonskid shoes/slippers when out of bed  Intervention: Prevent Skin Injury  Recent Flowsheet Documentation  Taken 7/18/2023 0400 by David Nieves RN  Body Position: position changed independently  Skin Protection:   adhesive use limited   tubing/devices free from skin contact   transparent dressing maintained   skin-to-skin areas padded   skin-to-device areas padded   protective footwear used  Taken 7/18/2023 0200 by Ezequiel  MARY Hernandez  Body Position: position changed independently  Skin Protection:   adhesive use limited   tubing/devices free from skin contact   transparent dressing maintained   skin-to-skin areas padded   skin-to-device areas padded   protective footwear used  Taken 7/18/2023 0000 by David Nieves RN  Body Position: position changed independently  Skin Protection:   adhesive use limited   tubing/devices free from skin contact   transparent dressing maintained   skin-to-skin areas padded   skin-to-device areas padded   protective footwear used  Taken 7/17/2023 2208 by David Nieves RN  Body Position: position changed independently  Skin Protection:   adhesive use limited   tubing/devices free from skin contact   transparent dressing maintained   skin-to-skin areas padded   skin-to-device areas padded   protective footwear used  Intervention: Prevent and Manage VTE (Venous Thromboembolism) Risk  Recent Flowsheet Documentation  Taken 7/18/2023 0400 by David Nieves RN  Activity Management: activity encouraged  Taken 7/18/2023 0200 by David Nieves RN  Activity Management: activity encouraged  Taken 7/18/2023 0000 by David Nieves RN  Activity Management: activity encouraged  Taken 7/17/2023 2208 by David Nieves RN  Activity Management: (walked from stretcher to bed) activity encouraged  VTE Prevention/Management:   bilateral   sequential compression devices off  Range of Motion: active ROM (range of motion) encouraged  Intervention: Prevent Infection  Recent Flowsheet Documentation  Taken 7/17/2023 2208 by David Nieves RN  Infection Prevention:   environmental surveillance performed   equipment surfaces disinfected   hand hygiene promoted   personal protective equipment utilized   rest/sleep promoted   single patient room provided  Goal: Optimal Comfort and Wellbeing  Outcome: Ongoing, Not Progressing  Intervention: Provide Person-Centered Care  Recent Flowsheet Documentation  Taken 7/17/2023 2208 by Ezequiel  David RN  Trust Relationship/Rapport:   care explained   questions answered   thoughts/feelings acknowledged  Goal: Readiness for Transition of Care  Outcome: Ongoing, Not Progressing  Intervention: Mutually Develop Transition Plan  Recent Flowsheet Documentation  Taken 7/17/2023 2212 by David Nieves RN  Transportation Anticipated: family or friend will provide  Patient/Family Anticipated Services at Transition: none  Patient/Family Anticipates Transition to: home  Taken 7/17/2023 2211 by David Nieves RN  Equipment Currently Used at Home: none     Problem: Adjustment to Illness COPD (Chronic Obstructive Pulmonary Disease)  Goal: Optimal Chronic Illness Coping  Outcome: Ongoing, Not Progressing  Intervention: Support and Optimize Psychosocial Response  Recent Flowsheet Documentation  Taken 7/17/2023 2208 by David Nieves RN  Family/Support System Care:   self-care encouraged   support provided     Problem: Functional Ability Impaired COPD (Chronic Obstructive Pulmonary Disease)  Goal: Optimal Level of Functional [place]  Outcome: Ongoing, Not Progressing  Intervention: Optimize Functional Ability  Recent Flowsheet Documentation  Taken 7/18/2023 0400 by David Nieves RN  Activity Management: activity encouraged  Taken 7/18/2023 0200 by David Nieves RN  Activity Management: activity encouraged  Taken 7/18/2023 0000 by David Nieves, RN  Activity Management: activity encouraged  Taken 7/17/2023 2208 by David Nieves, RN  Activity Management: (walked from stretcher to bed) activity encouraged     Problem: Infection COPD (Chronic Obstructive Pulmonary Disease)  Goal: Absence of Infection Signs and Symptoms  Outcome: Ongoing, Not Progressing     Problem: Oral Intake Inadequate COPD (Chronic Obstructive Pulmonary Disease)  Goal: Improved Nutrition Intake  Outcome: Ongoing, Not Progressing     Problem: Respiratory Compromise COPD (Chronic Obstructive Pulmonary Disease)  Goal: Effective Oxygenation and  Ventilation  Outcome: Ongoing, Not Progressing  Intervention: Promote Airway Secretion Clearance  Recent Flowsheet Documentation  Taken 7/18/2023 0400 by David Nievse RN  Activity Management: activity encouraged  Taken 7/18/2023 0200 by David Nieves RN  Activity Management: activity encouraged  Taken 7/18/2023 0000 by David Nieves RN  Activity Management: activity encouraged  Taken 7/17/2023 2208 by David Nieves RN  Activity Management: (walked from stretcher to bed) activity encouraged  Cough And Deep Breathing: done independently per patient  Intervention: Optimize Oxygenation and Ventilation  Recent Flowsheet Documentation  Taken 7/18/2023 0400 by David Nieves RN  Head of Bed (HOB) Positioning: HOB elevated  Taken 7/18/2023 0200 by David Nieves RN  Head of Bed (HOB) Positioning: HOB elevated  Taken 7/18/2023 0000 by David Nieves RN  Head of Bed (HOB) Positioning: HOB elevated  Taken 7/17/2023 2208 by David Nieves RN  Head of Bed (HOB) Positioning: HOB elevated   Goal Outcome Evaluation:  Plan of Care Reviewed With: patient        Progress: no change  Outcome Evaluation: VSS. 4L NC. NSR on the monitor. A&O x4. Pt ambulating to the bathroom with standby due to occasional dizziness. Pt reports SOA with exertion. No complaints of pain. Pt is resting comfortably at this time. No further complaints.

## 2023-07-18 NOTE — PLAN OF CARE
Goal Outcome Evaluation:              Outcome Evaluation: VSS, 4LNC. Pt resting comfortably, pain is well controlled. IV abx given.

## 2023-07-18 NOTE — CONSULTS
"                  Clinical Nutrition   Nutrition Support Assessment  Reason for Visit: Identified at risk by screening criteria, MST score 2+, BMI, Unintentional weight loss      Patient Name: Mary West  YOB: 1960  MRN: [id number]  Date of Encounter: 07/18/23 15:44 EDT  Admission date: 7/17/2023    Comments:    -Patient currently meets criteria for Severe Malnutrition in the context of Chronic Illness based on severe muscle wasting and severe fat loss.    -Ordered Boost Plus with meals.  -Informed pt that double portions are an option if desired.  -Changed diet from cardiac to regular.  -Ordered daily snacks for patient to eat between meals.  -Added food allergies to EMR.      Nutrition Assessment   Admission Diagnosis:  COPD exacerbation [J44.1]  Acute exacerbation of chronic obstructive pulmonary disease (COPD) [J44.1]      Problem List:    COPD exacerbation    COPD with acute exacerbation    Acute on chronic respiratory failure    Tobacco dependence    CAP (community acquired pneumonia)    Hypoglycemia    Acute exacerbation of chronic obstructive pulmonary disease (COPD)        PMH:   She  has a past medical history of Alcohol abuse, COPD (chronic obstructive pulmonary disease), and Pancreatitis.    PSH:  She  has no past surgical history on file.    Applicable Nutrition Concerns:   Skin:  Oral:  GI: chronic pancreatitis and takes scheduled Creon    Applicable Interval History:       Reported/Observed/Food/Nutrition Related History:   Patient reports she was eating less than usual d/t nausea (suspects a pancreatitis flare up) x 2 weeks which ended 1 week ago. Reports resulting unintentional weight loss and that a low wt of 72 lbs was reached 1 week ago per standing wt at MD office visit. Appetite and PO intake have improved during the past 1 week. Current bed scale weight of 82 lbs may reflect weight regain if accurate. At baseline patient reports eating \"like a horse\". Enjoys various foods " "including calorie-dense choices such as chicken wings, cheeseburgers, pizza as well as microwave meals, soups, sandwiches, salads, and fruits/vegetables (as snacks). Does not drink ONS at home. Has tried Boost before and likes chocolate flavor. Reports she is allergic to green peppers and mushrooms (swelling in face and throat). Denies difficulty chewing/swallowing. Reports usually her weight during pancreatitis flare up is 88-90 lbs and when she is feeling well it is ~97 lbs. Agrees to Boost Plus daily and daily snacks to eat between meals (likes fruits, peanut butter and crackers, pudding, sandwiches (turkey or ham + lettuce, gonzales, cheese).      Anthropometrics     Flowsheet Rows      Flowsheet Row First Filed Value   Admission Height 170.2 cm (67\") Documented at 07/17/2023 1514   Admission Weight 37.2 kg (82 lb 0.2 oz) Documented at 07/17/2023 1514       Height: Height: 170.2 cm (67\")  Last Filed Weight: Weight: 37.2 kg (82 lb) (07/17/23 2208)  Method: Weight Method: Bed scale  BMI: BMI (Calculated): 12.8  BMI classification: Underweight:<18.5kg/m2  IBW:  135 lbs    UBW: 88-90 lbs during chronic pancreatitis flare up versus ~97 lbs normally  72 lbs at MD office visit 1 week ago    Weight change: Pt reports recent unintentional weight loss x 2 weeks d/t nausea which resolved 1 week ago. Possible weight regain from 72 lbs to 82 lbs within the past 1 week if bed scale weight accurate.    Nutrition Focused Physical Exam     Date: 7/18  Patient meets criteria for malnutrition diagnosis, see MSA note.    Current Nutrition Prescription   PO: Diet: Cardiac Diets; Healthy Heart (2-3 Na+); Texture: Regular Texture (IDDSI 7); Fluid Consistency: Thin (IDDSI 0)  Oral Nutrition Supplement:   Intake: Insufficient data - 100% x 1 meal      Nutrition Diagnosis     Date:              Updated:    Problem Malnutrition  (severe, chronic)   Etiology COPD, chronic pancreatitis   Signs/Symptoms Severe muscle wasting, severe fat loss "   Status:     Goal:   General: Nutrition to support treatment  PO: Establish PO  EN/PN: N/A    Nutrition Intervention      Follow treatment progress, Care plan reviewed, Advise alternate selection, Advised available snacks, Interview for preferences, Menu adjusted, Encourage intake, Supplement provided    -Ordered Boost Plus with meals.  -Informed pt that double portions are an option if desired.  -Changed diet from cardiac to regular.  -Ordered daily snacks for patient to eat between meals.  -Added food allergies to EMR.      Monitoring/Evaluation:   Per protocol, I&O, PO intake, Supplement intake, Pertinent labs, Weight      Graciela Camarillo RD  Time Spent: 45 min

## 2023-07-18 NOTE — H&P
[facility]  HISTORY AND PHYSICAL    Patient Name: Mary West  : 1960  MRN: [id number]  Primary Care Physician: Provider, No Known  Date of admission: 2023    Subjective   Subjective     Chief Complaint:  Shortness of breath    HPI:  Mary West is a 62 y.o. female with a history of COPD (intermittent home O2 use), chronic pancreatitis, and ongoing tobacco use who presents to [facility] ED for complaint of worsening shortness of breath. She states this has been going on for about 2 weeks now but has worsened in the last few days. She reports that she wears supplemental O2 intermittently at night, but has had to use it much more often over the last 2 weeks. She states that her O2 tank ran out about 3 days ago, and has been trying to use her albuterol inhaler to combat her symptoms. She does also note somewhat worsening of her chronic cough as well. She denies fever, chills, chest pain, abdominal pain, nausea, vomiting or diarrhea. She does continue to smoke half pack a day.         Review of Systems   Constitutional:  Positive for fatigue. Negative for chills, fever and unexpected weight change.   HENT:  Negative for postnasal drip, sore throat and trouble swallowing.    Eyes:  Negative for photophobia and visual disturbance.   Respiratory:  Positive for cough and shortness of breath. Negative for wheezing.    Cardiovascular:  Negative for chest pain and palpitations.   Gastrointestinal:  Negative for abdominal pain, diarrhea, nausea and vomiting.   Genitourinary:  Negative for dysuria and hematuria.   Musculoskeletal:  Negative for arthralgias and myalgias.   Skin: Negative.    Neurological:  Positive for weakness (Generalized). Negative for tremors, syncope, speech difficulty and headaches.   Psychiatric/Behavioral:  Negative for confusion. The patient is not nervous/anxious.         Personal History     Past Medical History:   Diagnosis Date     Alcohol abuse     Quit early 2000's    COPD (chronic obstructive pulmonary disease)     Pancreatitis              No past surgical history on file.    Family History:  family history includes Heart failure in her mother; Stroke in her father and mother.     Social History:  reports that she has been smoking. She has a 20.00 pack-year smoking history. She has never used smokeless tobacco. Drug use questions deferred to the physician. She reports that she does not drink alcohol.  Social History     Social History Narrative    Not on file       Medications:  Pancrelipase (Lip-Prot-Amyl), acetaminophen, albuterol sulfate HFA, calcium carbonate, dextromethorphan polistirex ER, famotidine, guaiFENesin, multivitamin with minerals, ondansetron ODT, oxyCODONE-acetaminophen, and tiotropium bromide monohydrate    Allergies   Allergen Reactions    Methadone Swelling       Objective   Objective     Vital Signs:   Temp:  [97.8 °F (36.6 °C)] 97.8 °F (36.6 °C)  Heart Rate:  [63-86] 69  Resp:  [20-24] 20  BP: (111-150)/(68-90) 111/85  Flow (L/min):  [3-4] 3    Physical Exam  Constitutional:       General: She is not in acute distress.     Appearance: Normal appearance.      Comments: Frail malnourished appearing female in no acute distress.    HENT:      Head: Atraumatic.      Right Ear: External ear normal.      Left Ear: External ear normal.      Nose: Nose normal.   Eyes:      Extraocular Movements: Extraocular movements intact.      Conjunctiva/sclera: Conjunctivae normal.      Pupils: Pupils are equal, round, and reactive to light.   Cardiovascular:      Rate and Rhythm: Normal rate and regular rhythm.      Pulses: Normal pulses.      Heart sounds: Normal heart sounds. No murmur heard.  Pulmonary:      Effort: Pulmonary effort is normal. No respiratory distress.      Breath sounds: Rales present. No wheezing or rhonchi.      Comments: On 5L NC  Abdominal:      General: Bowel sounds are normal. There is no distension.       Tenderness: There is no abdominal tenderness. There is no guarding or rebound.   Musculoskeletal:         General: Normal range of motion.      Cervical back: No rigidity.      Right lower leg: No edema.      Left lower leg: No edema.   Skin:     General: Skin is warm and dry.      Coloration: Skin is not jaundiced.      Findings: No lesion or rash.   Neurological:      General: No focal deficit present.      Mental Status: She is alert and oriented to person, place, and time.   Psychiatric:         Attention and Perception: Attention normal.         Mood and Affect: Mood normal.         Behavior: Behavior normal.         Thought Content: Thought content normal.          Result Review:  I have personally reviewed the results from the time of this admission to 7/17/2023 20:41 EDT and agree with these findings:  [x]  Laboratory list / accordion  [x]  Microbiology  [x]  Radiology  [x]  EKG/Telemetry   []  Cardiology/Vascular   []  Pathology  []  Old records  []  Other:      LAB RESULTS:      Lab 07/17/23  1708   WBC 7.20   HEMOGLOBIN 16.2*   HEMATOCRIT 51.2*   PLATELETS 209   NEUTROS ABS 4.51   IMMATURE GRANS (ABS) 0.01   LYMPHS ABS 2.08   MONOS ABS 0.45   EOS ABS 0.08   .7*   LACTATE 1.4         Lab 07/17/23  1708   SODIUM 145   POTASSIUM 4.1   CHLORIDE 109*   CO2 28.0   ANION GAP 8.0   BUN 16   CREATININE 0.59   EGFR 102.0   GLUCOSE 58*   CALCIUM 9.0         Lab 07/17/23  1708   TOTAL PROTEIN 6.6   ALBUMIN 4.1   GLOBULIN 2.5   ALT (SGPT) 12   AST (SGOT) 23   BILIRUBIN 0.7   ALK PHOS 109         Lab 07/17/23  1708   PROBNP 6,751.0*   HSTROP T 18*                 Brief Urine Lab Results       None          Microbiology Results (last 10 days)       ** No results found for the last 240 hours. **            XR Chest 1 View    Result Date: 7/17/2023  XR CHEST 1 VW Date of Exam: 7/17/2023 4:22 PM EDT Indication: SOA triage protocol Comparison: 2/5/2023. Findings: Mildly increased right-sided pleural effusion noted.  Trace effusion persist on the left. Advanced chronic emphysematous changes are stable, without pneumothorax or new focal consolidation. Unchanged heart and mediastinal contours.     Impression: Impression: Advanced emphysema and mildly enlarged layering right-sided pleural effusion. Electronically Signed: Broderick Reynoso  7/17/2023 4:46 PM EDT  Workstation ID: [id number]    CT Angiogram Chest    Result Date: 7/17/2023  CT ANGIOGRAM CHEST Date of Exam: 7/17/2023 7:05 PM EDT Indication: hypoxia, r/o PE. Comparison: Chest radiograph from earlier today and CT chest from January 30, 2022 Technique: CTA of the chest was performed after the uneventful intravenous administration of 85 mL Isovue-370. Reconstructed coronal and sagittal images were also obtained. In addition, a 3-D volume rendered image was created for interpretation. Automated exposure control and iterative reconstruction methods were used. Findings: The central tracheobronchial tree is clear. There is advanced emphysema. There are small bilateral pleural effusions. There is a small consolidation in the superior segment of the left lower lobe. The heart is enlarged. The great vessels are normal in caliber. There is no evidence of pulmonary embolus. No abnormally enlarged lymph nodes are identified. Partial evaluation of the upper abdomen demonstrates pneumobilia, likely secondary to prior sphincterotomy. No aggressive osseous lesions are identified. There is a stable mild chronic compression deformity in the lower thoracic spine.     Impression: Impression: 1.Negative for pulmonary embolus. 2.Small consolidation within left lower lobe, likely a mild infectious or inflammatory process. 3.Advanced emphysema. 4.Cardiomegaly. Electronically Signed: Alton Coffman  7/17/2023 7:35 PM EDT  Workstation ID: [id number]     Results for orders placed during the hospital encounter of 01/30/22    Adult Transthoracic Echo Complete W/ Cont if Necessary Per  Protocol    Interpretation Summary  · Estimated right ventricular systolic pressure from tricuspid regurgitation is normal (<35 mmHg). Calculated right ventricular systolic pressure from tricuspid regurgitation is 29 mmHg.  · Estimated left ventricular EF = 50% Left ventricular systolic function is normal.      Assessment & Plan   Assessment & Plan       COPD with acute exacerbation    Acute on chronic respiratory failure    CAP (community acquired pneumonia)    Tobacco dependence    Hypoglycemia      Mary West is a 62 y.o. female with a history of COPD (intermittent home O2 use), chronic pancreatitis, and ongoing tobacco use who presents to [facility] ED for complaint of worsening shortness of breath.    Acute Respiratory Failure w/ Hypoxia  COPD w/ acute exacerbation  Pneumonia  -Currently on 5L NC. Her baseline is 2L only at night.   -CTA chest tonight negative for PE, but does show pneumonia of of the left lower lobe.  -Resp PCR pending  -Sputum cultures pending  -PNA urine cultures pending  -Received Ceftriaxone and Azithromycin in the ED. Will change to Rocephin + Doxycycline  -Duo Nebs q4  -Incentive Spirometer    Tobacco Use  -Mary West  reports that she has been smoking. She has a 20.00 pack-year smoking history. She has never used smokeless tobacco.. I have educated her on the risk of diseases from using tobacco products such as cancer, COPD, and heart disease.     I advised her to quit and she is not willing to quit.    I spent 6 minutes counseling the patient.             DVT prophylaxis:  SCDS    CODE STATUS:  Full Code       Expected DischargeTBD     This note has been completed as part of a split-shared workflow.     Signature: Electronically signed by Sharifa Harris PA-C, 07/17/23, 9:28 PM EDT      Total APC time spent: 65 mintues        Attending   Admission Attestation       I have performed an independent face-to-face diagnostic evaluation including performing an independent  "physical examination as documented here.  The documented plan of care above was reviewed and developed with the advanced practice clinician (APC).      Brief Summary Statement:   Mary West is a 62 y.o. female who states that she has had worsening shortness of breath over the last 2 weeks.  She has noticed increased frequency of cough as well.  She has known COPD and uses oxygen at home when at rest and at night.  She states that 3 days ago she ran out of O2 and her symptoms \"got a lot worse really quickly.\"  She states that because \"we are understaffed where I work\" she tried to use her albuterol inhaler and frequent short rest periods while continuing to work, but this was not effective.  She confirms that earlier today (Monday) she had a syncopal episode at work after coughing; she states her O2 saturation was not checked at that time; she denies any fever or chills, abdominal pain, chest pain, or slurred speech/facial droop.    Remainder of detailed HPI is as noted by APC and has been reviewed and/or edited by me for completeness.    Attending Physical Exam:  Temp:  [97.8 °F (36.6 °C)] 97.8 °F (36.6 °C)  Heart Rate:  [63-86] 81  Resp:  [20-24] 20  BP: (111-150)/(68-90) 113/81  Flow (L/min):  [3-4] 3    Constitutional: Awake, alert, NAD, pleasant.  Thin, chronically ill-appearing.  Eyes: PERRLA, sclerae anicteric, no conjunctival injection  HENT: NCAT, mucous membranes moist  Neck: Supple, no thyromegaly, no lymphadenopathy, trachea midline  Respiratory: Decreased/\"distant\" breath sounds to auscultation bilaterally, mild bilateral rales, nonlabored respirations.  During my visit she is on 5 L by nasal cannula and is satting 91-92%.  Cardiovascular: RRR, no murmurs, rubs, or gallops, palpable pedal pulses bilaterally  Gastrointestinal: Positive bowel sounds, soft, nontender, nondistended  Musculoskeletal: No bilateral ankle edema, no clubbing or cyanosis to extremities  Psychiatric: Appropriate affect, " cooperative  Neurologic: Oriented x 3, strength symmetric in all extremities, Cranial Nerves grossly intact to confrontation, speech clear  Skin: No rashes, normal turgor.    Brief Assessment/Plan :  See detailed assessment and plan developed with APC which I have reviewed and/or edited for completeness.    Total time spent: 27 minutes  Time spent includes time reviewing chart, face-to-face time, counseling patient/family/caregiver, ordering medications/tests/procedures, communicating with other health care professionals, documenting clinical information in the electronic health record, and coordination of care.         Wilfrido Oconnor III, DO  07/17/23

## 2023-07-18 NOTE — CONSULTS
Referring Provider: MD Sary  Reason for Consultation: AECOPD    Subjective .   Education: NN spoke with pt at BS.  Pt alert and able to answer questions appropriately.  Pt O2 sat  98% on  4 L currently, home O2 use  2L HS.  Pt reports the ability to ambulate without issue at baseline before experiencing SOB.  Pt states use of rescue medication  7 times weekly, relief of SOB within ~2 mins.  Patient is up to date on COVID, flu and PNA vaccines.  She is a current smoker.  Cessation encouraged.  Pt reports no issues at this time with medications or transportation for appointments.  The patient reports that she is taking samples of albuterol and Trelegy.  She also reports her home O2 has ran out and she has no PCP.   Pt reports previous hx of formal COPD teaching, no understanding of action plan, or OR.  Stop light report given to pt.  1800QUITNOW discussed at BS.  COPD education completed in the form of explanation, handouts, and videos.  No new concerns or questions voiced at this time.  NN will continue to follow as needed.     Age: 62 y.o.  Sex: female  Smoker Status: current, pack years unclear  Pulmonologist: NA  FEV1 (PFT): NA  Home O2: 2L HS    Objective     SpO2 SpO2: 95 % (07/18/23 1118)  Device Device (Oxygen Therapy): nasal cannula (07/18/23 1219)  Flow Flow (L/min): 4 (07/18/23 1219)  Incentive Spirometer    IS Predicted Level (mL)     Number of Repetitions     Level Incentive Spirometer (mL)    Patient Tolerance     Inhaler Treatment Status    Treatment Route        Home Medications:  Medications Prior to Admission   Medication Sig Dispense Refill Last Dose    multivitamin with minerals tablet tablet Take 1 tablet by mouth Daily.       oxyCODONE-acetaminophen (PERCOCET)  MG per tablet Take 1 tablet by mouth Daily.       Pancrelipase, Lip-Prot-Amyl, (CREON) 03897-174779 units capsule delayed-release particles capsule Take 1 capsule by mouth 3 (Three) Times a Day With Meals. 90 capsule 0         Discussion: Per current GOLD Standards, please consider:  LAMA/LABA/ICS in place (Trelegy), Outpatient PFT, Rehab as appropriate, Palliative Care consult, NRT at [place], Annual LDCT per current screening guidelines (age 50-80 years old, smoking history of 20 pack years or more or has quit within past 15 years)     The patient reports that she is taking samples of albuterol and Trelegy.  She also reports her home O2 has ran out and she has no PCP.      Discussed with primary RN, JANETH Umaña RN

## 2023-07-18 NOTE — PAYOR COMM NOTE
"Esther HERNANDEZ UR   phone: [phone]  fax: [phone]      Keo West (62 y.o. Female)       Date of Birth   1960    Social Security Number       Address   [address] [place] [address]    Home Phone   [phone]    MRN   [id number]       [employer]   [employer]    Marital Status                               Admission Date   23    Admission Type   Emergency    Admitting Provider   Sathya Okeefe MD    Attending Provider   Sathya Okeefe MD    Department, Room/Bed   [facility] 2G, S224/1       Discharge Date       Discharge Disposition       Discharge Destination                                 Attending Provider: Sathya Okeefe MD    Allergies: Methadone    Isolation: None   Infection: None   Code Status: CPR    Ht: 170.2 cm (67\")   Wt: 37.2 kg (82 lb)    Admission Cmt: None   Principal Problem: COPD exacerbation [J44.1]                   Active Insurance as of 2023       Primary Coverage       Payor Plan Insurance Group Employer/Plan Group    [facility] MEDICARE REPLACEMENT [facility] MEDICARE REPLACEMENT [id number]       Payor Plan Address Payor Plan Phone Number Payor Plan Fax Number Effective Dates    [address] [address] [phone]  2019 - None Entered    [facility] [id number]         Subscriber Name Subscriber Birth Date Member ID       KEO WEST 1960 [id number]                     Emergency Contacts        (Rel.) Home Phone Work Phone Mobile Phone    Pearl Owens (Daughter) -- -- [phone]    Anna Watson (Friend) -- -- [phone]                 History & Physical        Wilfrido Oconnor III, DO at 23 2040              [facility] Medicine Services  HISTORY AND PHYSICAL    Patient Name: Keo West  : 1960  MRN: [id number]  Primary Care Physician: Provider, No Known  Date of admission: 2023    Subjective  Subjective     Chief " Complaint:  Shortness of breath    HPI:  Mary West is a 62 y.o. female with a history of COPD (intermittent home O2 use), chronic pancreatitis, and ongoing tobacco use who presents to [facility] ED for complaint of worsening shortness of breath. She states this has been going on for about 2 weeks now but has worsened in the last few days. She reports that she wears supplemental O2 intermittently at night, but has had to use it much more often over the last 2 weeks. She states that her O2 tank ran out about 3 days ago, and has been trying to use her albuterol inhaler to combat her symptoms. She does also note somewhat worsening of her chronic cough as well. She denies fever, chills, chest pain, abdominal pain, nausea, vomiting or diarrhea. She does continue to smoke half pack a day.         Review of Systems   Constitutional:  Positive for fatigue. Negative for chills, fever and unexpected weight change.   HENT:  Negative for postnasal drip, sore throat and trouble swallowing.    Eyes:  Negative for photophobia and visual disturbance.   Respiratory:  Positive for cough and shortness of breath. Negative for wheezing.    Cardiovascular:  Negative for chest pain and palpitations.   Gastrointestinal:  Negative for abdominal pain, diarrhea, nausea and vomiting.   Genitourinary:  Negative for dysuria and hematuria.   Musculoskeletal:  Negative for arthralgias and myalgias.   Skin: Negative.    Neurological:  Positive for weakness (Generalized). Negative for tremors, syncope, speech difficulty and headaches.   Psychiatric/Behavioral:  Negative for confusion. The patient is not nervous/anxious.         Personal History     Past Medical History:   Diagnosis Date    Alcohol abuse     Quit early 2000's    COPD (chronic obstructive pulmonary disease)     Pancreatitis              No past surgical history on file.    Family History:  family history includes Heart failure in her mother; Stroke in her father and mother.      Social History:  reports that she has been smoking. She has a 20.00 pack-year smoking history. She has never used smokeless tobacco. Drug use questions deferred to the physician. She reports that she does not drink alcohol.  Social History     Social History Narrative    Not on file       Medications:  Pancrelipase (Lip-Prot-Amyl), acetaminophen, albuterol sulfate HFA, calcium carbonate, dextromethorphan polistirex ER, famotidine, guaiFENesin, multivitamin with minerals, ondansetron ODT, oxyCODONE-acetaminophen, and tiotropium bromide monohydrate    Allergies   Allergen Reactions    Methadone Swelling       Objective  Objective     Vital Signs:   Temp:  [97.8 °F (36.6 °C)] 97.8 °F (36.6 °C)  Heart Rate:  [63-86] 69  Resp:  [20-24] 20  BP: (111-150)/(68-90) 111/85  Flow (L/min):  [3-4] 3    Physical Exam  Constitutional:       General: She is not in acute distress.     Appearance: Normal appearance.      Comments: Frail malnourished appearing female in no acute distress.    HENT:      Head: Atraumatic.      Right Ear: External ear normal.      Left Ear: External ear normal.      Nose: Nose normal.   Eyes:      Extraocular Movements: Extraocular movements intact.      Conjunctiva/sclera: Conjunctivae normal.      Pupils: Pupils are equal, round, and reactive to light.   Cardiovascular:      Rate and Rhythm: Normal rate and regular rhythm.      Pulses: Normal pulses.      Heart sounds: Normal heart sounds. No murmur heard.  Pulmonary:      Effort: Pulmonary effort is normal. No respiratory distress.      Breath sounds: Rales present. No wheezing or rhonchi.      Comments: On 5L NC  Abdominal:      General: Bowel sounds are normal. There is no distension.      Tenderness: There is no abdominal tenderness. There is no guarding or rebound.   Musculoskeletal:         General: Normal range of motion.      Cervical back: No rigidity.      Right lower leg: No edema.      Left lower leg: No edema.   Skin:     General: Skin  is warm and dry.      Coloration: Skin is not jaundiced.      Findings: No lesion or rash.   Neurological:      General: No focal deficit present.      Mental Status: She is alert and oriented to person, place, and time.   Psychiatric:         Attention and Perception: Attention normal.         Mood and Affect: Mood normal.         Behavior: Behavior normal.         Thought Content: Thought content normal.          Result Review:  I have personally reviewed the results from the time of this admission to 7/17/2023 20:41 EDT and agree with these findings:  [x]  Laboratory list / accordion  [x]  Microbiology  [x]  Radiology  [x]  EKG/Telemetry   []  Cardiology/Vascular   []  Pathology  []  Old records  []  Other:      LAB RESULTS:      Lab 07/17/23  1708   WBC 7.20   HEMOGLOBIN 16.2*   HEMATOCRIT 51.2*   PLATELETS 209   NEUTROS ABS 4.51   IMMATURE GRANS (ABS) 0.01   LYMPHS ABS 2.08   MONOS ABS 0.45   EOS ABS 0.08   .7*   LACTATE 1.4         Lab 07/17/23  1708   SODIUM 145   POTASSIUM 4.1   CHLORIDE 109*   CO2 28.0   ANION GAP 8.0   BUN 16   CREATININE 0.59   EGFR 102.0   GLUCOSE 58*   CALCIUM 9.0         Lab 07/17/23  1708   TOTAL PROTEIN 6.6   ALBUMIN 4.1   GLOBULIN 2.5   ALT (SGPT) 12   AST (SGOT) 23   BILIRUBIN 0.7   ALK PHOS 109         Lab 07/17/23  1708   PROBNP 6,751.0*   HSTROP T 18*                 Brief Urine Lab Results       None          Microbiology Results (last 10 days)       ** No results found for the last 240 hours. **            XR Chest 1 View    Result Date: 7/17/2023  XR CHEST 1 VW Date of Exam: 7/17/2023 4:22 PM EDT Indication: SOA triage protocol Comparison: 2/5/2023. Findings: Mildly increased right-sided pleural effusion noted. Trace effusion persist on the left. Advanced chronic emphysematous changes are stable, without pneumothorax or new focal consolidation. Unchanged heart and mediastinal contours.     Impression: Impression: Advanced emphysema and mildly enlarged layering  right-sided pleural effusion. Electronically Signed: Broderick Reynoso  7/17/2023 4:46 PM EDT  Workstation ID: [id number]    CT Angiogram Chest    Result Date: 7/17/2023  CT ANGIOGRAM CHEST Date of Exam: 7/17/2023 7:05 PM EDT Indication: hypoxia, r/o PE. Comparison: Chest radiograph from earlier today and CT chest from January 30, 2022 Technique: CTA of the chest was performed after the uneventful intravenous administration of 85 mL Isovue-370. Reconstructed coronal and sagittal images were also obtained. In addition, a 3-D volume rendered image was created for interpretation. Automated exposure control and iterative reconstruction methods were used. Findings: The central tracheobronchial tree is clear. There is advanced emphysema. There are small bilateral pleural effusions. There is a small consolidation in the superior segment of the left lower lobe. The heart is enlarged. The great vessels are normal in caliber. There is no evidence of pulmonary embolus. No abnormally enlarged lymph nodes are identified. Partial evaluation of the upper abdomen demonstrates pneumobilia, likely secondary to prior sphincterotomy. No aggressive osseous lesions are identified. There is a stable mild chronic compression deformity in the lower thoracic spine.     Impression: Impression: 1.Negative for pulmonary embolus. 2.Small consolidation within left lower lobe, likely a mild infectious or inflammatory process. 3.Advanced emphysema. 4.Cardiomegaly. Electronically Signed: Alton Coffman  7/17/2023 7:35 PM EDT  Workstation ID: [id number]     Results for orders placed during the hospital encounter of 01/30/22    Adult Transthoracic Echo Complete W/ Cont if Necessary Per Protocol    Interpretation Summary  · Estimated right ventricular systolic pressure from tricuspid regurgitation is normal (<35 mmHg). Calculated right ventricular systolic pressure from tricuspid regurgitation is 29 mmHg.  · Estimated left ventricular EF = 50% Left  ventricular systolic function is normal.      Assessment & Plan  Assessment & Plan       COPD with acute exacerbation    Acute on chronic respiratory failure    CAP (community acquired pneumonia)    Tobacco dependence    Hypoglycemia      Mary West is a 62 y.o. female with a history of COPD (intermittent home O2 use), chronic pancreatitis, and ongoing tobacco use who presents to [facility] ED for complaint of worsening shortness of breath.    Acute Respiratory Failure w/ Hypoxia  COPD w/ acute exacerbation  Pneumonia  -Currently on 5L NC. Her baseline is 2L only at night.   -CTA chest tonight negative for PE, but does show pneumonia of of the left lower lobe.  -Resp PCR pending  -Sputum cultures pending  -PNA urine cultures pending  -Received Ceftriaxone and Azithromycin in the ED. Will change to Rocephin + Doxycycline  -Duo Nebs q4  -Incentive Spirometer    Tobacco Use  -Mary West  reports that she has been smoking. She has a 20.00 pack-year smoking history. She has never used smokeless tobacco.. I have educated her on the risk of diseases from using tobacco products such as cancer, COPD, and heart disease.     I advised her to quit and she is not willing to quit.    I spent 6 minutes counseling the patient.             DVT prophylaxis:  SCDS    CODE STATUS:  Full Code       Expected DischargeTBD     This note has been completed as part of a split-shared workflow.     Signature: Electronically signed by Sharifa Harris PA-C, 07/17/23, 9:28 PM EDT      Total APC time spent: 65 mintues        Attending   Admission Attestation       I have performed an independent face-to-face diagnostic evaluation including performing an independent physical examination as documented here.  The documented plan of care above was reviewed and developed with the advanced practice clinician (APC).      Brief Summary Statement:   Mary West is a 62 y.o. female who states that she has had worsening shortness of  "breath over the last 2 weeks.  She has noticed increased frequency of cough as well.  She has known COPD and uses oxygen at home when at rest and at night.  She states that 3 days ago she ran out of O2 and her symptoms \"got a lot worse really quickly.\"  She states that because \"we are understaffed where I work\" she tried to use her albuterol inhaler and frequent short rest periods while continuing to work, but this was not effective.  She confirms that earlier today (Monday) she had a syncopal episode at work after coughing; she states her O2 saturation was not checked at that time; she denies any fever or chills, abdominal pain, chest pain, or slurred speech/facial droop.    Remainder of detailed HPI is as noted by APC and has been reviewed and/or edited by me for completeness.    Attending Physical Exam:  Temp:  [97.8 °F (36.6 °C)] 97.8 °F (36.6 °C)  Heart Rate:  [63-86] 81  Resp:  [20-24] 20  BP: (111-150)/(68-90) 113/81  Flow (L/min):  [3-4] 3    Constitutional: Awake, alert, NAD, pleasant.  Thin, chronically ill-appearing.  Eyes: PERRLA, sclerae anicteric, no conjunctival injection  HENT: NCAT, mucous membranes moist  Neck: Supple, no thyromegaly, no lymphadenopathy, trachea midline  Respiratory: Decreased/\"distant\" breath sounds to auscultation bilaterally, mild bilateral rales, nonlabored respirations.  During my visit she is on 5 L by nasal cannula and is satting 91-92%.  Cardiovascular: RRR, no murmurs, rubs, or gallops, palpable pedal pulses bilaterally  Gastrointestinal: Positive bowel sounds, soft, nontender, nondistended  Musculoskeletal: No bilateral ankle edema, no clubbing or cyanosis to extremities  Psychiatric: Appropriate affect, cooperative  Neurologic: Oriented x 3, strength symmetric in all extremities, Cranial Nerves grossly intact to confrontation, speech clear  Skin: No rashes, normal turgor.    Brief Assessment/Plan :  See detailed assessment and plan developed with APC which I have " reviewed and/or edited for completeness.    Total time spent: 27 minutes  Time spent includes time reviewing chart, face-to-face time, counseling patient/family/caregiver, ordering medications/tests/procedures, communicating with other health care professionals, documenting clinical information in the electronic health record, and coordination of care.         Wilfrido OconnorIII, DO  07/17/23               Electronically signed by Wilfrido Oconnor III, DO at 07/17/23 2140       Lab Results (last 24 hours)       Procedure Component Value Units Date/Time    MRSA Screen, PCR (Inpatient) - Swab, Nares [[id number]]  (Normal) Collected: 07/17/23 2351    Specimen: Swab from Nares Updated: 07/18/23 0843     MRSA PCR Negative    Narrative:      The negative predictive value of this diagnostic test is high and should only be used to consider de-escalating anti-MRSA therapy. A positive result may indicate colonization with MRSA and must be correlated clinically.  MRSA Negative    Basic Metabolic Panel [[id number]]  (Abnormal) Collected: 07/18/23 0543    Specimen: Blood Updated: 07/18/23 0756     Glucose 129 mg/dL      BUN 17 mg/dL      Creatinine 0.57 mg/dL      Sodium 141 mmol/L      Potassium 4.6 mmol/L      Comment: Slight hemolysis detected by analyzer. Results may be affected.        Chloride 107 mmol/L      CO2 24.0 mmol/L      Calcium 8.7 mg/dL      BUN/Creatinine Ratio 29.8     Anion Gap 10.0 mmol/L      eGFR 102.9 mL/min/1.73     Narrative:      GFR Normal >60  Chronic Kidney Disease <60  Kidney Failure <15      Lipid Panel [[id number]]  (Abnormal) Collected: 07/18/23 0543    Specimen: Blood Updated: 07/18/23 0756     Total Cholesterol 108 mg/dL      Triglycerides 54 mg/dL      HDL Cholesterol 37 mg/dL      LDL Cholesterol  58 mg/dL      VLDL Cholesterol 13 mg/dL      LDL/HDL Ratio 1.63    Narrative:      Cholesterol Reference Ranges  (U.S. Department of Health and Human Services ATP III  Classifications)    Desirable          <200 mg/dL  Borderline High    200-239 mg/dL  High Risk          >240 mg/dL      Triglyceride Reference Ranges  (U.S. Department of Health and Human Services ATP III Classifications)    Normal           <150 mg/dL  Borderline High  150-199 mg/dL  High             200-499 mg/dL  Very High        >500 mg/dL    HDL Reference Ranges  (U.S. Department of Health and Human Services ATP III Classifications)    Low     <40 mg/dl (major risk factor for CHD)  High    >60 mg/dl ('negative' risk factor for CHD)        LDL Reference Ranges  (U.S. Department of Health and Human Services ATP III Classifications)    Optimal          <100 mg/dL  Near Optimal     100-129 mg/dL  Borderline High  130-159 mg/dL  High             160-189 mg/dL  Very High        >189 mg/dL    S. Pneumo Ag Urine or CSF - Urine, Urine, Clean Catch [[id number]] Collected: 07/18/23 0116    Specimen: Urine, Clean Catch Updated: 07/18/23 0616    Legionella Antigen, Urine - Urine, Urine, Clean Catch [[id number]] Collected: 07/18/23 0116    Specimen: Urine, Clean Catch Updated: 07/18/23 0616    Respiratory Panel PCR w/COVID-19(SARS-CoV-2) JONAS/RINA/NAFISA/PAD/COR/MAD/SALOME In-House, NP Swab in UTM/VTM, 3-4 HR TAT - Swab, Nasopharynx [[id number]]  (Normal) Collected: 07/17/23 2351    Specimen: Swab from Nasopharynx Updated: 07/18/23 0103     ADENOVIRUS, PCR Not Detected     Coronavirus 229E Not Detected     Coronavirus HKU1 Not Detected     Coronavirus NL63 Not Detected     Coronavirus OC43 Not Detected     COVID19 Not Detected     Human Metapneumovirus Not Detected     Human Rhinovirus/Enterovirus Not Detected     Influenza A PCR Not Detected     Influenza B PCR Not Detected     Parainfluenza Virus 1 Not Detected     Parainfluenza Virus 2 Not Detected     Parainfluenza Virus 3 Not Detected     Parainfluenza Virus 4 Not Detected     RSV, PCR Not Detected     Bordetella pertussis pcr Not Detected     Bordetella parapertussis PCR Not  "Detected     Chlamydophila pneumoniae PCR Not Detected     Mycoplasma pneumo by PCR Not Detected    Narrative:      In the setting of a positive respiratory panel with a viral infection PLUS a negative procalcitonin without other underlying concern for bacterial infection, consider observing off antibiotics or discontinuation of antibiotics and continue supportive care. If the respiratory panel is positive for atypical bacterial infection (Bordetella pertussis, Chlamydophila pneumoniae, or Mycoplasma pneumoniae), consider antibiotic de-escalation to target atypical bacterial infection.    Procalcitonin [[id number]]  (Normal) Collected: 07/17/23 1708    Specimen: Blood Updated: 07/17/23 2232     Procalcitonin 0.02 ng/mL     Narrative:      As a Marker for Sepsis (Non-Neonates):    1. <0.5 ng/mL represents a low risk of severe sepsis and/or septic shock.  2. >2 ng/mL represents a high risk of severe sepsis and/or septic shock.    As a Marker for Lower Respiratory Tract Infections that require antibiotic therapy:    PCT on Admission    Antibiotic Therapy       6-12 Hrs later    >0.5                Strongly Recommended  >0.25 - <0.5        Recommended   0.1 - 0.25          Discouraged              Remeasure/reassess PCT  <0.1                Strongly Discouraged     Remeasure/reassess PCT    As 28 day mortality risk marker: \"Change in Procalcitonin Result\" (>80% or <=80%) if Day 0 (or Day 1) and Day 4 values are available. Refer to http://www.[facility]-pct-calculator.com    Change in PCT <=80%  A decrease of PCT levels below or equal to 80% defines a positive change in PCT test result representing a higher risk for 28-day all-cause mortality of patients diagnosed with severe sepsis for septic shock.    Change in PCT >80%  A decrease of PCT levels of more than 80% defines a negative change in PCT result representing a lower risk for 28-day all-cause mortality of patients diagnosed with severe sepsis or septic shock.       " [place] Draw [[id number]] Collected: 07/17/23 1708    Specimen: Blood Updated: 07/17/23 2115    Narrative:      The following orders were created for panel order [place] Draw.  Procedure                               Abnormality         Status                     ---------                               -----------         ------                     Green Top (Gel)[[id number]]                                  Final result               Lavender Top[[id number]]                                     Final result               Gold Top - SST[[id number]]                                   Final result               Gray Top[[id number]]                                         Final result               Light Blue Top[[id number]]                                   Final result                 Please view results for these tests on the individual orders.    Gray Top [[id number]] Collected: 07/17/23 1708    Specimen: Blood Updated: 07/17/23 2115     Extra Tube Hold for add-ons.     Comment: Auto resulted.       POC Glucose Once [[id number]]  (Abnormal) Collected: 07/17/23 2111    Specimen: Blood Updated: 07/17/23 2112     Glucose 260 mg/dL      Comment: Meter: [id number] : [id number] Atul Davis       Blood Gas, Arterial With Co-Ox [[id number]]  (Abnormal) Collected: 07/17/23 2048    Specimen: Arterial Blood Updated: 07/17/23 2048     Site Right Radial     Collin's Test N/A     pH, Arterial 7.376 pH units      pCO2, Arterial 44.8 mm Hg      pO2, Arterial 53.0 mm Hg      Comment: 84 Value below reference range        HCO3, Arterial 26.2 mmol/L      Base Excess, Arterial 0.6 mmol/L      Hemoglobin, Blood Gas 15.3 g/dL      Hematocrit, Blood Gas 46.9 %      Oxyhemoglobin 83.0 %      Comment: 84 Value below reference range        Methemoglobin 0.00 %      Carboxyhemoglobin 4.4 %      Comment: 83 Value above reference range        CO2 Content 27.6 mmol/L      Temperature 37.0 C      Barometric Pressure for Blood Gas --     Comment: N/A         Modality Nasal Cannula     FIO2 36 %      Rate 0 Breaths/minute      PIP 0 cmH2O      Comment: Meter: [id number]     :  [id number]        IPAP 0     EPAP 0     Note --     pH, Temp Corrected 7.376 pH Units      pCO2, Temperature Corrected 44.8 mm Hg      pO2, Temperature Corrected 53.0 mm Hg     Green Top (Gel) [[id number]] Collected: 07/17/23 1708    Specimen: Blood Updated: 07/17/23 1815     Extra Tube Hold for add-ons.     Comment: Auto resulted.       Lavender Top [[id number]] Collected: 07/17/23 1708    Specimen: Blood Updated: 07/17/23 1815     Extra Tube hold for add-on     Comment: Auto resulted       Gold Top - SST [[id number]] Collected: 07/17/23 1708    Specimen: Blood Updated: 07/17/23 1815     Extra Tube Hold for add-ons.     Comment: Auto resulted.       Light Blue Top [[id number]] Collected: 07/17/23 1708    Specimen: Blood Updated: 07/17/23 1815     Extra Tube Hold for add-ons.     Comment: Auto resulted       Comprehensive Metabolic Panel [[id number]]  (Abnormal) Collected: 07/17/23 1708    Specimen: Blood Updated: 07/17/23 1745     Glucose 58 mg/dL      BUN 16 mg/dL      Creatinine 0.59 mg/dL      Sodium 145 mmol/L      Potassium 4.1 mmol/L      Comment: Slight hemolysis detected by analyzer. Results may be affected.        Chloride 109 mmol/L      CO2 28.0 mmol/L      Calcium 9.0 mg/dL      Total Protein 6.6 g/dL      Albumin 4.1 g/dL      ALT (SGPT) 12 U/L      AST (SGOT) 23 U/L      Alkaline Phosphatase 109 U/L      Total Bilirubin 0.7 mg/dL      Globulin 2.5 gm/dL      Comment: Calculated Result        A/G Ratio 1.6 g/dL      BUN/Creatinine Ratio 27.1     Anion Gap 8.0 mmol/L      eGFR 102.0 mL/min/1.73     Narrative:      GFR Normal >60  Chronic Kidney Disease <60  Kidney Failure <15      Blood Culture - Blood, Hand, Left [[id number]] Collected: 07/17/23 1726    Specimen: Blood from Hand, Left Updated: 07/17/23 1741    Single High Sensitivity Troponin T [[id number]]  (Abnormal)  Collected: 07/17/23 1708    Specimen: Blood Updated: 07/17/23 1741     HS Troponin T 18 ng/L     Narrative:      High Sensitive Troponin T Reference Range:  <10.0 ng/L- Negative Female for AMI  <15.0 ng/L- Negative Male for AMI  >=10 - Abnormal Female indicating possible myocardial injury.  >=15 - Abnormal Male indicating possible myocardial injury.   Clinicians would have to utilize clinical acumen, EKG, Troponin, and serial changes to determine if it is an Acute Myocardial Infarction or myocardial injury due to an underlying chronic condition.         BNP [[id number]]  (Abnormal) Collected: 07/17/23 1708    Specimen: Blood Updated: 07/17/23 1741     proBNP 6,751.0 pg/mL     Narrative:      Among patients with dyspnea, NT-proBNP is highly sensitive for the detection of acute congestive heart failure. In addition NT-proBNP of <300 pg/ml effectively rules out acute congestive heart failure with 99% negative predictive value.    Results may be falsely decreased if patient taking Biotin.      Lactic Acid, Plasma [[id number]]  (Normal) Collected: 07/17/23 1708    Specimen: Blood Updated: 07/17/23 1735     Lactate 1.4 mmol/L      Comment: Falsely depressed results may occur on samples drawn from patients receiving N-Acetylcysteine (NAC) or Metamizole.       CBC & Differential [[id number]]  (Abnormal) Collected: 07/17/23 1708    Specimen: Blood Updated: 07/17/23 1733    Narrative:      The following orders were created for panel order CBC & Differential.  Procedure                               Abnormality         Status                     ---------                               -----------         ------                     CBC Auto Differential[[id number]]        Abnormal            Final result                 Please view results for these tests on the individual orders.    CBC Auto Differential [[id number]]  (Abnormal) Collected: 07/17/23 1708    Specimen: Blood Updated: 07/17/23 1733     WBC 7.20 10*3/mm3      RBC 4.89  10*6/mm3      Hemoglobin 16.2 g/dL      Hematocrit 51.2 %      .7 fL      MCH 33.1 pg      MCHC 31.6 g/dL      RDW 14.9 %      RDW-SD 58.2 fl      MPV 10.2 fL      Platelets 209 10*3/mm3      Neutrophil % 62.6 %      Lymphocyte % 28.9 %      Monocyte % 6.3 %      Eosinophil % 1.1 %      Basophil % 1.0 %      Immature Grans % 0.1 %      Neutrophils, Absolute 4.51 10*3/mm3      Lymphocytes, Absolute 2.08 10*3/mm3      Monocytes, Absolute 0.45 10*3/mm3      Eosinophils, Absolute 0.08 10*3/mm3      Basophils, Absolute 0.07 10*3/mm3      Immature Grans, Absolute 0.01 10*3/mm3      nRBC 0.0 /100 WBC     Blood Culture - Blood, Arm, Right [[id number]] Collected: 07/17/23 1705    Specimen: Blood from Arm, Right Updated: 07/17/23 1723          Imaging Results (Last 24 Hours)       Procedure Component Value Units Date/Time    CT Angiogram Chest [[id number]] Collected: 07/17/23 1930     Updated: 07/17/23 1938    Narrative:      CT ANGIOGRAM CHEST    Date of Exam: 7/17/2023 7:05 PM EDT    Indication: hypoxia, r/o PE.    Comparison: Chest radiograph from earlier today and CT chest from January 30, 2022    Technique: CTA of the chest was performed after the uneventful intravenous administration of 85 mL Isovue-370. Reconstructed coronal and sagittal images were also obtained. In addition, a 3-D volume rendered image was created for interpretation.   Automated exposure control and iterative reconstruction methods were used.      Findings:  The central tracheobronchial tree is clear. There is advanced emphysema. There are small bilateral pleural effusions. There is a small consolidation in the superior segment of the left lower lobe.    The heart is enlarged. The great vessels are normal in caliber. There is no evidence of pulmonary embolus. No abnormally enlarged lymph nodes are identified.    Partial evaluation of the upper abdomen demonstrates pneumobilia, likely secondary to prior sphincterotomy.    No aggressive osseous  lesions are identified. There is a stable mild chronic compression deformity in the lower thoracic spine.      Impression:      Impression:  1.Negative for pulmonary embolus.  2.Small consolidation within left lower lobe, likely a mild infectious or inflammatory process.  3.Advanced emphysema.  4.Cardiomegaly.        Electronically Signed: Alton Coffman    7/17/2023 7:35 PM EDT    Workstation ID: [id number]    XR Chest 1 View [[id number]] Collected: 07/17/23 1645     Updated: 07/17/23 1649    Narrative:      XR CHEST 1 VW    Date of Exam: 7/17/2023 4:22 PM EDT    Indication: SOA triage protocol    Comparison: 2/5/2023.    Findings:  Mildly increased right-sided pleural effusion noted. Trace effusion persist on the left. Advanced chronic emphysematous changes are stable, without pneumothorax or new focal consolidation. Unchanged heart and mediastinal contours.       Impression:      Impression:  Advanced emphysema and mildly enlarged layering right-sided pleural effusion.      Electronically Signed: Broderick Reynoso    7/17/2023 4:46 PM EDT    Workstation ID: [id number]          Physician Progress Notes (last 24 hours)  Notes from 07/17/23 0925 through 07/18/23 0925   No notes of this type exist for this encounter.       Consult Notes (last 24 hours)  Notes from 07/17/23 0925 through 07/18/23 0925   No notes of this type exist for this encounter.

## 2023-07-18 NOTE — PROGRESS NOTES
Malnutrition Severity Assessment    Patient Name:  Mary West  YOB: 1960  MRN: [id number]  Admit Date:  7/17/2023    Patient meets criteria for : Severe Malnutrition (-Patient currently meets criteria for Severe Malnutrition in the context of Chronic Illness based on severe muscle wasting and severe fat loss.)    Malnutrition Severity Assessment  Malnutrition Type: Chronic Disease - Related Malnutrition  Malnutrition Type (last 8 hours)       Malnutrition Severity Assessment       Row Name 07/18/23 1559       Malnutrition Severity Assessment    Malnutrition Type Chronic Disease - Related Malnutrition      Row Name 07/18/23 1559       Muscle Loss    Loss of Muscle Mass Findings Severe    Quaker Region Severe - deep hollowing/scooping, lack of muscle to touch, facial bones well defined    Clavicle Bone Region Severe - protruding prominent bone    Acromion Bone Region Severe - squared shoulders, bones, and acromion process protrusion prominent    Patellar Region Severe - prominent bone, square looking, very little muscle definition    Anterior Thigh Region Severe - line/depression along thigh, obviously thin    Posterior Calf Region Severe - thin with very little definition/firmness      Row Name 07/18/23 1559       Fat Loss    Subcutaneous Fat Loss Findings Severe    Orbital Region  Severe - pronounced hollowness/depression, dark circles, loose saggy skin    Upper Arm Region Severe - mostly skin, very little space between folds, fingers touch      Row Name 07/18/23 1559       Criteria Met (Must meet criteria for severity in at least 2 of these categories: M Wasting, Fat Loss, Fluid, Secondary Signs, Wt. Status, Intake)    Patient meets criteria for  Severe Malnutrition  -Patient currently meets criteria for Severe Malnutrition in the context of Chronic Illness based on severe muscle wasting and severe fat loss.                    Electronically signed by:  Graciela Camarillo RD  07/18/23 16:00  EDT

## 2023-07-18 NOTE — PROGRESS NOTES
[facility] Medicine Services  PROGRESS NOTE    Patient Name: Mary West  : 1960  MRN: [id number]    Date of Admission: 2023  Primary Care Physician: Provider, No Known    Subjective   Subjective     CC:  SOA    HPI:  States that she feels a little better.  Says she ran out of her oxygen.     ROS:  Gen- No fevers, chills  CV- No chest pain, palpitations  Resp- No cough, dyspnea  GI- No N/V/D, abd pain      Objective   Objective     Vital Signs:   Temp:  [97.7 °F (36.5 °C)-98.6 °F (37 °C)] 98.6 °F (37 °C)  Heart Rate:  [55-86] 74  Resp:  [14-24] 16  BP: ()/(67-90) 109/73  Flow (L/min):  [3-4] 4     Physical Exam:  Constitutional: No acute distress, awake, alert  HENT: NCAT, mucous membranes moist  Respiratory: Clear to auscultation bilaterally, respiratory effort normal on 4LNC  Cardiovascular: RRR, no murmurs, rubs, or gallops  Gastrointestinal: Positive bowel sounds, soft, nontender, nondistended  Musculoskeletal: No bilateral ankle edema  Psychiatric: Appropriate affect, cooperative  Neurologic: Oriented x 3, strength symmetric in all extremities, Cranial Nerves grossly intact to confrontation, speech clear  Skin: No rashes      Results Reviewed:  LAB RESULTS:      Lab 23  1708   WBC 7.20   HEMOGLOBIN 16.2*   HEMATOCRIT 51.2*   PLATELETS 209   NEUTROS ABS 4.51   IMMATURE GRANS (ABS) 0.01   LYMPHS ABS 2.08   MONOS ABS 0.45   EOS ABS 0.08   .7*   PROCALCITONIN 0.02   LACTATE 1.4         Lab 23  0543 23  1708   SODIUM 141 145   POTASSIUM 4.6 4.1   CHLORIDE 107 109*   CO2 24.0 28.0   ANION GAP 10.0 8.0   BUN 17 16   CREATININE 0.57 0.59   EGFR 102.9 102.0   GLUCOSE 129* 58*   CALCIUM 8.7 9.0         Lab 23  1708   TOTAL PROTEIN 6.6   ALBUMIN 4.1   GLOBULIN 2.5   ALT (SGPT) 12   AST (SGOT) 23   BILIRUBIN 0.7   ALK PHOS 109         Lab 23  1708   PROBNP 6,751.0*   HSTROP T 18*         Lab 23  0543   CHOLESTEROL 108   LDL CHOL 58    HDL CHOL 37*   TRIGLYCERIDES 54             Lab 07/17/23 2048   PH, ARTERIAL 7.376   PCO2, ARTERIAL 44.8   PO2 ART 53.0*   FIO2 36   HCO3 ART 26.2*   BASE EXCESS ART 0.6   CARBOXYHEMOGLOBIN 4.4*     Brief Urine Lab Results       None            Microbiology Results Abnormal       Procedure Component Value - Date/Time    Legionella Antigen, Urine - Urine, Urine, Clean Catch [[id number]]  (Normal) Collected: 07/18/23 0116    Lab Status: Final result Specimen: Urine, Clean Catch Updated: 07/18/23 0933     LEGIONELLA ANTIGEN, URINE Negative    S. Pneumo Ag Urine or CSF - Urine, Urine, Clean Catch [[id number]]  (Normal) Collected: 07/18/23 0116    Lab Status: Final result Specimen: Urine, Clean Catch Updated: 07/18/23 0933     Strep Pneumo Ag Negative    MRSA Screen, PCR (Inpatient) - Swab, Nares [[id number]]  (Normal) Collected: 07/17/23 2351    Lab Status: Final result Specimen: Swab from Nares Updated: 07/18/23 0843     MRSA PCR Negative    Narrative:      The negative predictive value of this diagnostic test is high and should only be used to consider de-escalating anti-MRSA therapy. A positive result may indicate colonization with MRSA and must be correlated clinically.  MRSA Negative    Respiratory Panel PCR w/COVID-19(SARS-CoV-2) JONAS/RINA/NAFISA/PAD/COR/MAD/SALOME In-House, NP Swab in UTM/VTM, 3-4 HR TAT - Swab, Nasopharynx [[id number]]  (Normal) Collected: 07/17/23 2351    Lab Status: Final result Specimen: Swab from Nasopharynx Updated: 07/18/23 0103     ADENOVIRUS, PCR Not Detected     Coronavirus 229E Not Detected     Coronavirus HKU1 Not Detected     Coronavirus NL63 Not Detected     Coronavirus OC43 Not Detected     COVID19 Not Detected     Human Metapneumovirus Not Detected     Human Rhinovirus/Enterovirus Not Detected     Influenza A PCR Not Detected     Influenza B PCR Not Detected     Parainfluenza Virus 1 Not Detected     Parainfluenza Virus 2 Not Detected     Parainfluenza Virus 3 Not Detected      Parainfluenza Virus 4 Not Detected     RSV, PCR Not Detected     Bordetella pertussis pcr Not Detected     Bordetella parapertussis PCR Not Detected     Chlamydophila pneumoniae PCR Not Detected     Mycoplasma pneumo by PCR Not Detected    Narrative:      In the setting of a positive respiratory panel with a viral infection PLUS a negative procalcitonin without other underlying concern for bacterial infection, consider observing off antibiotics or discontinuation of antibiotics and continue supportive care. If the respiratory panel is positive for atypical bacterial infection (Bordetella pertussis, Chlamydophila pneumoniae, or Mycoplasma pneumoniae), consider antibiotic de-escalation to target atypical bacterial infection.            XR Chest 1 View    Result Date: 7/17/2023  XR CHEST 1 VW Date of Exam: 7/17/2023 4:22 PM EDT Indication: SOA triage protocol Comparison: 2/5/2023. Findings: Mildly increased right-sided pleural effusion noted. Trace effusion persist on the left. Advanced chronic emphysematous changes are stable, without pneumothorax or new focal consolidation. Unchanged heart and mediastinal contours.     Impression: Impression: Advanced emphysema and mildly enlarged layering right-sided pleural effusion. Electronically Signed: Broderick Reynoso  7/17/2023 4:46 PM EDT  Workstation ID: [id number]    CT Angiogram Chest    Result Date: 7/17/2023  CT ANGIOGRAM CHEST Date of Exam: 7/17/2023 7:05 PM EDT Indication: hypoxia, r/o PE. Comparison: Chest radiograph from earlier today and CT chest from January 30, 2022 Technique: CTA of the chest was performed after the uneventful intravenous administration of 85 mL Isovue-370. Reconstructed coronal and sagittal images were also obtained. In addition, a 3-D volume rendered image was created for interpretation. Automated exposure control and iterative reconstruction methods were used. Findings: The central tracheobronchial tree is clear. There is advanced emphysema. There  are small bilateral pleural effusions. There is a small consolidation in the superior segment of the left lower lobe. The heart is enlarged. The great vessels are normal in caliber. There is no evidence of pulmonary embolus. No abnormally enlarged lymph nodes are identified. Partial evaluation of the upper abdomen demonstrates pneumobilia, likely secondary to prior sphincterotomy. No aggressive osseous lesions are identified. There is a stable mild chronic compression deformity in the lower thoracic spine.     Impression: Impression: 1.Negative for pulmonary embolus. 2.Small consolidation within left lower lobe, likely a mild infectious or inflammatory process. 3.Advanced emphysema. 4.Cardiomegaly. Electronically Signed: Alton Coffman  7/17/2023 7:35 PM EDT  Workstation ID: [id number]     Results for orders placed during the hospital encounter of 01/30/22    Adult Transthoracic Echo Complete W/ Cont if Necessary Per Protocol    Interpretation Summary  · Estimated right ventricular systolic pressure from tricuspid regurgitation is normal (<35 mmHg). Calculated right ventricular systolic pressure from tricuspid regurgitation is 29 mmHg.  · Estimated left ventricular EF = 50% Left ventricular systolic function is normal.      Current medications:  Scheduled Meds:cefTRIAXone, 1,000 mg, Intravenous, Q24H  doxycycline, 100 mg, Intravenous, Q12H  guaiFENesin, 1,200 mg, Oral, Q12H  ipratropium-albuterol, 3 mL, Nebulization, Q4H - RT  pancrelipase (Lip-Prot-Amyl), 36,000 units of lipase, Oral, TID With Meals  sodium chloride, 10 mL, Intravenous, Q12H      Continuous Infusions:lactated ringers, 75 mL/hr, Last Rate: 75 mL/hr (07/17/23 5381)      PRN Meds:.  acetaminophen    calcium carbonate    dextromethorphan polistirex ER    melatonin    ondansetron **OR** ondansetron    sodium chloride    sodium chloride    sodium chloride    Assessment & Plan   Assessment & Plan     Active Hospital Problems    Diagnosis  POA    **COPD  exacerbation [J44.1]  Yes    Hypoglycemia [E16.2]  Yes    Acute exacerbation of chronic obstructive pulmonary disease (COPD) [J44.1]  Yes    CAP (community acquired pneumonia) [J18.9]  Yes    Tobacco dependence [F17.200]  Yes    Acute on chronic respiratory failure [J96.20]  Yes    COPD with acute exacerbation [J44.1]  Yes      Resolved Hospital Problems   No resolved problems to display.        Brief Hospital Course to date:  Mary eWst is a 62 y.o. female with a history of COPD (intermittent home O2 use), chronic pancreatitis, and ongoing tobacco use who presents to [facility] ED for complaint of worsening shortness of breath.     Acute Respiratory Failure w/ Hypoxia  COPD w/ acute exacerbation  Pneumonia  -Currently requiring 4L NC. Her baseline is 2L only at night.   -CTA chest tonight negative for PE, but does show pneumonia of of the left lower lobe.  -Resp PCR negative.  MRSA PCR negative  -Sputum cultures pending  -strep pneumo, legionella ag's negative  -continue Rocephin + Doxycycline day 2  -Duo Nebs q4  -Incentive Spirometer     Tobacco Use  --encouraged patient that absolutely must quit smoking        Expected Discharge Location and Transportation:   Expected Discharge   Expected discharge date/ time has not been documented.     DVT prophylaxis:  Mechanical DVT prophylaxis orders are present.     AM-PAC 6 Clicks Score (PT): 22 (07/18/23 0800)    CODE STATUS:   Code Status and Medical Interventions:   Ordered at: 07/17/23 2130     Code Status (Patient has no pulse and is not breathing):    CPR (Attempt to Resuscitate)     Medical Interventions (Patient has pulse or is breathing):    Full Support       Sathya Okeefe MD  07/18/23

## 2023-07-19 PROBLEM — E43 SEVERE MALNUTRITION: Status: ACTIVE | Noted: 2023-07-19

## 2023-07-19 LAB
ANION GAP SERPL CALCULATED.3IONS-SCNC: 10 MMOL/L (ref 5–15)
BASOPHILS # BLD AUTO: 0.06 10*3/MM3 (ref 0–0.2)
BASOPHILS NFR BLD AUTO: 0.9 % (ref 0–1.5)
BUN SERPL-MCNC: 18 MG/DL (ref 8–23)
BUN/CREAT SERPL: 36 (ref 7–25)
CALCIUM SPEC-SCNC: 8.7 MG/DL (ref 8.6–10.5)
CHLORIDE SERPL-SCNC: 109 MMOL/L (ref 98–107)
CO2 SERPL-SCNC: 24 MMOL/L (ref 22–29)
CREAT SERPL-MCNC: 0.5 MG/DL (ref 0.57–1)
DEPRECATED RDW RBC AUTO: 58.1 FL (ref 37–54)
EGFRCR SERPLBLD CKD-EPI 2021: 106.2 ML/MIN/1.73
EOSINOPHIL # BLD AUTO: 0.07 10*3/MM3 (ref 0–0.4)
EOSINOPHIL NFR BLD AUTO: 1 % (ref 0.3–6.2)
ERYTHROCYTE [DISTWIDTH] IN BLOOD BY AUTOMATED COUNT: 15.1 % (ref 12.3–15.4)
GLUCOSE SERPL-MCNC: 106 MG/DL (ref 65–99)
HCT VFR BLD AUTO: 44.5 % (ref 34–46.6)
HGB BLD-MCNC: 13.9 G/DL (ref 12–15.9)
IMM GRANULOCYTES # BLD AUTO: 0.03 10*3/MM3 (ref 0–0.05)
IMM GRANULOCYTES NFR BLD AUTO: 0.4 % (ref 0–0.5)
LYMPHOCYTES # BLD AUTO: 1.08 10*3/MM3 (ref 0.7–3.1)
LYMPHOCYTES NFR BLD AUTO: 15.4 % (ref 19.6–45.3)
MCH RBC QN AUTO: 32.5 PG (ref 26.6–33)
MCHC RBC AUTO-ENTMCNC: 31.2 G/DL (ref 31.5–35.7)
MCV RBC AUTO: 104 FL (ref 79–97)
MONOCYTES # BLD AUTO: 0.41 10*3/MM3 (ref 0.1–0.9)
MONOCYTES NFR BLD AUTO: 5.9 % (ref 5–12)
NEUTROPHILS NFR BLD AUTO: 5.35 10*3/MM3 (ref 1.7–7)
NEUTROPHILS NFR BLD AUTO: 76.4 % (ref 42.7–76)
NRBC BLD AUTO-RTO: 0 /100 WBC (ref 0–0.2)
PLATELET # BLD AUTO: 177 10*3/MM3 (ref 140–450)
PMV BLD AUTO: 10.1 FL (ref 6–12)
POTASSIUM SERPL-SCNC: 4.4 MMOL/L (ref 3.5–5.2)
RBC # BLD AUTO: 4.28 10*6/MM3 (ref 3.77–5.28)
SODIUM SERPL-SCNC: 143 MMOL/L (ref 136–145)
WBC NRBC COR # BLD: 7 10*3/MM3 (ref 3.4–10.8)

## 2023-07-19 PROCEDURE — 94799 UNLISTED PULMONARY SVC/PX: CPT

## 2023-07-19 PROCEDURE — 25010000002 METHYLPREDNISOLONE PER 125 MG: Performed by: INTERNAL MEDICINE

## 2023-07-19 PROCEDURE — 85025 COMPLETE CBC W/AUTO DIFF WBC: CPT | Performed by: PHYSICIAN ASSISTANT

## 2023-07-19 PROCEDURE — 94664 DEMO&/EVAL PT USE INHALER: CPT

## 2023-07-19 PROCEDURE — 25010000002 CEFTRIAXONE PER 250 MG: Performed by: PHYSICIAN ASSISTANT

## 2023-07-19 PROCEDURE — 63710000001 ONDANSETRON PER 8 MG: Performed by: PHYSICIAN ASSISTANT

## 2023-07-19 PROCEDURE — 80048 BASIC METABOLIC PNL TOTAL CA: CPT | Performed by: PHYSICIAN ASSISTANT

## 2023-07-19 PROCEDURE — 25010000002 METHYLPREDNISOLONE PER 40 MG: Performed by: INTERNAL MEDICINE

## 2023-07-19 PROCEDURE — 99232 SBSQ HOSP IP/OBS MODERATE 35: CPT | Performed by: INTERNAL MEDICINE

## 2023-07-19 RX ORDER — METHYLPREDNISOLONE SODIUM SUCCINATE 125 MG/2ML
60 INJECTION, POWDER, LYOPHILIZED, FOR SOLUTION INTRAMUSCULAR; INTRAVENOUS EVERY 8 HOURS
Status: DISCONTINUED | OUTPATIENT
Start: 2023-07-19 | End: 2023-07-19

## 2023-07-19 RX ORDER — METHYLPREDNISOLONE SODIUM SUCCINATE 40 MG/ML
60 INJECTION, POWDER, LYOPHILIZED, FOR SOLUTION INTRAMUSCULAR; INTRAVENOUS EVERY 8 HOURS
Status: DISCONTINUED | OUTPATIENT
Start: 2023-07-19 | End: 2023-07-22

## 2023-07-19 RX ADMIN — METHYLPREDNISOLONE SODIUM SUCCINATE 60 MG: 40 INJECTION, POWDER, LYOPHILIZED, FOR SOLUTION INTRAMUSCULAR; INTRAVENOUS at 22:14

## 2023-07-19 RX ADMIN — IPRATROPIUM BROMIDE AND ALBUTEROL SULFATE 3 ML: .5; 2.5 SOLUTION RESPIRATORY (INHALATION) at 22:54

## 2023-07-19 RX ADMIN — DOXYCYCLINE 100 MG: 100 INJECTION, POWDER, LYOPHILIZED, FOR SOLUTION INTRAVENOUS at 11:26

## 2023-07-19 RX ADMIN — GUAIFENESIN 1200 MG: 600 TABLET, EXTENDED RELEASE ORAL at 09:12

## 2023-07-19 RX ADMIN — ONDANSETRON HYDROCHLORIDE 4 MG: 4 TABLET, FILM COATED ORAL at 20:16

## 2023-07-19 RX ADMIN — PANCRELIPASE 36000 UNITS OF LIPASE: 60000; 12000; 38000 CAPSULE, DELAYED RELEASE PELLETS ORAL at 17:18

## 2023-07-19 RX ADMIN — SODIUM CHLORIDE 1000 MG: 900 INJECTION INTRAVENOUS at 20:11

## 2023-07-19 RX ADMIN — IPRATROPIUM BROMIDE AND ALBUTEROL SULFATE 3 ML: .5; 2.5 SOLUTION RESPIRATORY (INHALATION) at 03:15

## 2023-07-19 RX ADMIN — IPRATROPIUM BROMIDE AND ALBUTEROL SULFATE 3 ML: .5; 2.5 SOLUTION RESPIRATORY (INHALATION) at 08:53

## 2023-07-19 RX ADMIN — DOXYCYCLINE 100 MG: 100 INJECTION, POWDER, LYOPHILIZED, FOR SOLUTION INTRAVENOUS at 22:14

## 2023-07-19 RX ADMIN — PANCRELIPASE 36000 UNITS OF LIPASE: 60000; 12000; 38000 CAPSULE, DELAYED RELEASE PELLETS ORAL at 09:12

## 2023-07-19 RX ADMIN — Medication 5 MG: at 20:16

## 2023-07-19 RX ADMIN — PANCRELIPASE 36000 UNITS OF LIPASE: 60000; 12000; 38000 CAPSULE, DELAYED RELEASE PELLETS ORAL at 11:26

## 2023-07-19 RX ADMIN — IPRATROPIUM BROMIDE AND ALBUTEROL SULFATE 3 ML: .5; 2.5 SOLUTION RESPIRATORY (INHALATION) at 16:05

## 2023-07-19 RX ADMIN — Medication 10 ML: at 20:11

## 2023-07-19 RX ADMIN — IPRATROPIUM BROMIDE AND ALBUTEROL SULFATE 3 ML: .5; 2.5 SOLUTION RESPIRATORY (INHALATION) at 18:56

## 2023-07-19 RX ADMIN — GUAIFENESIN 1200 MG: 600 TABLET, EXTENDED RELEASE ORAL at 20:11

## 2023-07-19 RX ADMIN — METHYLPREDNISOLONE SODIUM SUCCINATE 60 MG: 125 INJECTION, POWDER, FOR SOLUTION INTRAMUSCULAR; INTRAVENOUS at 11:28

## 2023-07-19 NOTE — PLAN OF CARE
Problem: Adult Inpatient Plan of Care  Goal: Plan of Care Review  Outcome: Ongoing, Progressing  Flowsheets  Taken 7/19/2023 0439  Progress: no change  Outcome Evaluation: VSS. High flow nasal cannula with humidification applied. NSR on the monitor. A&O x4. PRN tylenol given for headache. Abx given. Pt resting comfortably with no further complaints.  Taken 7/18/2023 0426  Plan of Care Reviewed With: patient  Goal: Patient-Specific Goal (Individualized)  Outcome: Ongoing, Progressing  Goal: Absence of Hospital-Acquired Illness or Injury  Outcome: Ongoing, Progressing  Intervention: Identify and Manage Fall Risk  Recent Flowsheet Documentation  Taken 7/19/2023 0400 by David Nieves, MARY  Safety Promotion/Fall Prevention:   activity supervised   assistive device/personal items within reach   clutter free environment maintained   safety round/check completed   room organization consistent   nonskid shoes/slippers when out of bed  Taken 7/19/2023 0200 by David Nieves, MARY  Safety Promotion/Fall Prevention:   activity supervised   assistive device/personal items within reach   clutter free environment maintained   safety round/check completed   room organization consistent   nonskid shoes/slippers when out of bed  Taken 7/19/2023 0000 by David Nieves RN  Safety Promotion/Fall Prevention:   clutter free environment maintained   activity supervised   assistive device/personal items within reach   safety round/check completed   room organization consistent   nonskid shoes/slippers when out of bed  Taken 7/18/2023 2200 by David Nieves, MARY  Safety Promotion/Fall Prevention:   activity supervised   assistive device/personal items within reach   clutter free environment maintained   safety round/check completed   room organization consistent   nonskid shoes/slippers when out of bed  Taken 7/18/2023 2000 by David Nieves, RN  Safety Promotion/Fall Prevention:   activity supervised   assistive device/personal items within  reach   clutter free environment maintained   safety round/check completed   room organization consistent   nonskid shoes/slippers when out of bed  Intervention: Prevent Skin Injury  Recent Flowsheet Documentation  Taken 7/19/2023 0400 by David Nieves RN  Body Position: position changed independently  Skin Protection:   adhesive use limited   tubing/devices free from skin contact   transparent dressing maintained   skin-to-device areas padded   skin-to-skin areas padded   protective footwear used  Taken 7/19/2023 0200 by David Nieves RN  Body Position: position changed independently  Skin Protection:   adhesive use limited   tubing/devices free from skin contact   transparent dressing maintained   skin-to-skin areas padded   skin-to-device areas padded   protective footwear used  Taken 7/19/2023 0000 by David Nieves RN  Body Position: position changed independently  Skin Protection:   adhesive use limited   tubing/devices free from skin contact   transparent dressing maintained   skin-to-skin areas padded   skin-to-device areas padded   protective footwear used  Taken 7/18/2023 2200 by David Nieves RN  Body Position: position changed independently  Skin Protection:   adhesive use limited   tubing/devices free from skin contact   transparent dressing maintained   skin-to-skin areas padded   skin-to-device areas padded   protective footwear used  Taken 7/18/2023 2000 by David Nieves RN  Body Position: position changed independently  Skin Protection:   adhesive use limited   tubing/devices free from skin contact   transparent dressing maintained   skin-to-device areas padded   skin-to-skin areas padded   protective footwear used   pulse oximeter probe site changed  Intervention: Prevent and Manage VTE (Venous Thromboembolism) Risk  Recent Flowsheet Documentation  Taken 7/19/2023 0400 by David Nieves RN  Activity Management: activity encouraged  Taken 7/19/2023 0200 by David Nieves RN  Activity Management:  activity encouraged  Taken 7/19/2023 0000 by David Nieves RN  Activity Management: activity encouraged  Taken 7/18/2023 2200 by David Nieves RN  Activity Management: activity encouraged  Taken 7/18/2023 2000 by David Nieves RN  Activity Management: activity encouraged  Range of Motion: active ROM (range of motion) encouraged  Intervention: Prevent Infection  Recent Flowsheet Documentation  Taken 7/18/2023 2000 by David Nieves RN  Infection Prevention:   environmental surveillance performed   equipment surfaces disinfected   hand hygiene promoted   personal protective equipment utilized   rest/sleep promoted   single patient room provided  Goal: Optimal Comfort and Wellbeing  Outcome: Ongoing, Progressing  Intervention: Monitor Pain and Promote Comfort  Recent Flowsheet Documentation  Taken 7/18/2023 2348 by David Nieves RN  Pain Management Interventions: see MAR  Intervention: Provide Person-Centered Care  Recent Flowsheet Documentation  Taken 7/18/2023 2000 by David Nieves RN  Trust Relationship/Rapport:   care explained   questions answered   thoughts/feelings acknowledged  Goal: Readiness for Transition of Care  Outcome: Ongoing, Progressing     Problem: Adjustment to Illness COPD (Chronic Obstructive Pulmonary Disease)  Goal: Optimal Chronic Illness Coping  Outcome: Ongoing, Progressing  Intervention: Support and Optimize Psychosocial Response  Recent Flowsheet Documentation  Taken 7/18/2023 2000 by David Nieves RN  Supportive Measures:   active listening utilized   self-care encouraged   verbalization of feelings encouraged  Family/Support System Care:   self-care encouraged   support provided     Problem: Functional Ability Impaired COPD (Chronic Obstructive Pulmonary Disease)  Goal: Optimal Level of Functional [place]  Outcome: Ongoing, Progressing  Intervention: Optimize Functional Ability  Recent Flowsheet Documentation  Taken 7/19/2023 0400 by David Nieves, RN  Activity Management:  activity encouraged  Self-Care Promotion: independence encouraged  Taken 7/19/2023 0200 by David Nieves RN  Activity Management: activity encouraged  Self-Care Promotion: independence encouraged  Taken 7/19/2023 0000 by David Nieves RN  Activity Management: activity encouraged  Self-Care Promotion: independence encouraged  Taken 7/18/2023 2200 by David Nieves RN  Activity Management: activity encouraged  Self-Care Promotion: independence encouraged  Taken 7/18/2023 2000 by David Nieves RN  Activity Management: activity encouraged  Environmental Support:   calm environment promoted   rest periods encouraged  Self-Care Promotion: independence encouraged     Problem: Infection COPD (Chronic Obstructive Pulmonary Disease)  Goal: Absence of Infection Signs and Symptoms  Outcome: Ongoing, Progressing     Problem: Oral Intake Inadequate COPD (Chronic Obstructive Pulmonary Disease)  Goal: Improved Nutrition Intake  Outcome: Ongoing, Progressing     Problem: Respiratory Compromise COPD (Chronic Obstructive Pulmonary Disease)  Goal: Effective Oxygenation and Ventilation  Outcome: Ongoing, Progressing  Intervention: Promote Airway Secretion Clearance  Recent Flowsheet Documentation  Taken 7/19/2023 0400 by David Nieves RN  Activity Management: activity encouraged  Taken 7/19/2023 0200 by David Nieves RN  Activity Management: activity encouraged  Taken 7/19/2023 0000 by David Nieves RN  Activity Management: activity encouraged  Taken 7/18/2023 2200 by David Nieves RN  Activity Management: activity encouraged  Taken 7/18/2023 2000 by David Nieves RN  Activity Management: activity encouraged  Breathing Techniques/Airway Clearance: deep/controlled cough encouraged  Cough And Deep Breathing: done with encouragement  Intervention: Optimize Oxygenation and Ventilation  Recent Flowsheet Documentation  Taken 7/19/2023 0400 by David Nieves RN  Head of Bed (HOB) Positioning: HOB elevated  Taken 7/19/2023 0200 by  David Nieves RN  Head of Bed ([facility]) Positioning: HOB elevated  Taken 7/19/2023 0000 by David Nieves RN  Head of Bed ([facility]) Positioning: HOB elevated  Taken 7/18/2023 2200 by David Nieves RN  Head of Bed ([facility]) Positioning: HOB elevated  Taken 7/18/2023 2000 by David Nieves RN  Head of Bed ([facility]) Positioning: HOB elevated  Airway/Ventilation Management:   airway patency maintained   humidification applied   pulmonary hygiene promoted   calming measures promoted   Goal Outcome Evaluation:  Plan of Care Reviewed With: patient        Progress: no change  Outcome Evaluation: VSS. High flow nasal cannula with humidification applied. NSR on the monitor. A&O x4. PRN tylenol given for headache. Abx given. Pt resting comfortably with no further complaints.

## 2023-07-19 NOTE — PROGRESS NOTES
NN spoke with pt at .  Pt alert and able to answer questions appropriately. Pt O2 sat 92% on  6 L currently, home O2 use 2 L HS. COPD action plan reviewed. Deep breathing exercises encouraged. No new concerns or questions voiced at this time.  NN will continue to follow as needed.        Per current GOLD Standards, please consider:  LAMA/LABA/ICS in place (Trelegy), Outpatient PFT, Rehab as appropriate, Palliative Care consult, NRT at [place], Annual LDCT per current screening guidelines (age 50-80 years old, smoking history of 20 pack years or more or has quit within past 15 years)      The patient reports that she is taking samples of albuterol and Trelegy.  She also reports her home O2 has ran out and she has no PCP.

## 2023-07-19 NOTE — PROGRESS NOTES
"    [facility] Medicine Services  PROGRESS NOTE    Patient Name: Mary West  : 1960  MRN: [id number]    Date of Admission: 2023  Primary Care Physician: Provider, No Known    Subjective   Subjective     CC:  SOA    HPI:  Feels \"so so.\"  Now requiring HFNC.      ROS:  Gen- No fevers, chills  CV- No chest pain, palpitations  Resp- No cough, dyspnea  GI- No N/V/D, abd pain      Objective   Objective     Vital Signs:   Temp:  [97.7 °F (36.5 °C)-98.6 °F (37 °C)] 98 °F (36.7 °C)  Heart Rate:  [64-88] 71  Resp:  [16-22] 20  BP: (109-127)/(73-82) 119/82  Flow (L/min):  [4-40] 6     Physical Exam:  Constitutional: No acute distress, awake, alert  HENT: NCAT, mucous membranes moist  Respiratory: decrease air movement throughout lung fields, respiratory effort normal 35L HFNC 60%  Cardiovascular: RRR, no murmurs, rubs, or gallops  Gastrointestinal: Positive bowel sounds, soft, nontender, nondistended  Musculoskeletal: No bilateral ankle edema  Psychiatric: Appropriate affect, cooperative  Neurologic: Oriented x 3, strength symmetric in all extremities, Cranial Nerves grossly intact to confrontation, speech clear  Skin: No rashes      Results Reviewed:  LAB RESULTS:      Lab 23  0535 23  1708   WBC 4.54 7.20   HEMOGLOBIN 14.4 16.2*   HEMATOCRIT 47.0* 51.2*   PLATELETS 179 209   NEUTROS ABS 3.62 4.51   IMMATURE GRANS (ABS) 0.05 0.01   LYMPHS ABS 0.73 2.08   MONOS ABS 0.11 0.45   EOS ABS 0.01 0.08   .3* 104.7*   PROCALCITONIN  --  0.02   LACTATE  --  1.4         Lab 23  0543 23  1708   SODIUM 141 145   POTASSIUM 4.6 4.1   CHLORIDE 107 109*   CO2 24.0 28.0   ANION GAP 10.0 8.0   BUN 17 16   CREATININE 0.57 0.59   EGFR 102.9 102.0   GLUCOSE 129* 58*   CALCIUM 8.7 9.0         Lab 23  1708   TOTAL PROTEIN 6.6   ALBUMIN 4.1   GLOBULIN 2.5   ALT (SGPT) 12   AST (SGOT) 23   BILIRUBIN 0.7   ALK PHOS 109         Lab 23  1708   PROBNP 6,751.0*   HSTROP T 18* "         Lab 07/18/23  0543   CHOLESTEROL 108   LDL CHOL 58   HDL CHOL 37*   TRIGLYCERIDES 54             Lab 07/17/23 2048   PH, ARTERIAL 7.376   PCO2, ARTERIAL 44.8   PO2 ART 53.0*   FIO2 36   HCO3 ART 26.2*   BASE EXCESS ART 0.6   CARBOXYHEMOGLOBIN 4.4*     Brief Urine Lab Results       None            Microbiology Results Abnormal       Procedure Component Value - Date/Time    Blood Culture - Blood, Hand, Left [[id number]]  (Normal) Collected: 07/17/23 1726    Lab Status: Preliminary result Specimen: Blood from Hand, Left Updated: 07/18/23 1745     Blood Culture No growth at 24 hours    Blood Culture - Blood, Arm, Right [[id number]]  (Normal) Collected: 07/17/23 1705    Lab Status: Preliminary result Specimen: Blood from Arm, Right Updated: 07/18/23 1731     Blood Culture No growth at 24 hours    Legionella Antigen, Urine - Urine, Urine, Clean Catch [[id number]]  (Normal) Collected: 07/18/23 0116    Lab Status: Final result Specimen: Urine, Clean Catch Updated: 07/18/23 0933     LEGIONELLA ANTIGEN, URINE Negative    S. Pneumo Ag Urine or CSF - Urine, Urine, Clean Catch [[id number]]  (Normal) Collected: 07/18/23 0116    Lab Status: Final result Specimen: Urine, Clean Catch Updated: 07/18/23 0933     Strep Pneumo Ag Negative    MRSA Screen, PCR (Inpatient) - Swab, Nares [[id number]]  (Normal) Collected: 07/17/23 2351    Lab Status: Final result Specimen: Swab from Nares Updated: 07/18/23 0843     MRSA PCR Negative    Narrative:      The negative predictive value of this diagnostic test is high and should only be used to consider de-escalating anti-MRSA therapy. A positive result may indicate colonization with MRSA and must be correlated clinically.  MRSA Negative    Respiratory Panel PCR w/COVID-19(SARS-CoV-2) JONAS/RINA/NAFISA/PAD/COR/MAD/SALOME In-House, NP Swab in UTM/VTM, 3-4 HR TAT - Swab, Nasopharynx [[id number]]  (Normal) Collected: 07/17/23 2351    Lab Status: Final result Specimen: Swab from Nasopharynx Updated:  07/18/23 0103     ADENOVIRUS, PCR Not Detected     Coronavirus 229E Not Detected     Coronavirus HKU1 Not Detected     Coronavirus NL63 Not Detected     Coronavirus OC43 Not Detected     COVID19 Not Detected     Human Metapneumovirus Not Detected     Human Rhinovirus/Enterovirus Not Detected     Influenza A PCR Not Detected     Influenza B PCR Not Detected     Parainfluenza Virus 1 Not Detected     Parainfluenza Virus 2 Not Detected     Parainfluenza Virus 3 Not Detected     Parainfluenza Virus 4 Not Detected     RSV, PCR Not Detected     Bordetella pertussis pcr Not Detected     Bordetella parapertussis PCR Not Detected     Chlamydophila pneumoniae PCR Not Detected     Mycoplasma pneumo by PCR Not Detected    Narrative:      In the setting of a positive respiratory panel with a viral infection PLUS a negative procalcitonin without other underlying concern for bacterial infection, consider observing off antibiotics or discontinuation of antibiotics and continue supportive care. If the respiratory panel is positive for atypical bacterial infection (Bordetella pertussis, Chlamydophila pneumoniae, or Mycoplasma pneumoniae), consider antibiotic de-escalation to target atypical bacterial infection.            XR Chest 1 View    Result Date: 7/17/2023  XR CHEST 1 VW Date of Exam: 7/17/2023 4:22 PM EDT Indication: SOA triage protocol Comparison: 2/5/2023. Findings: Mildly increased right-sided pleural effusion noted. Trace effusion persist on the left. Advanced chronic emphysematous changes are stable, without pneumothorax or new focal consolidation. Unchanged heart and mediastinal contours.     Impression: Impression: Advanced emphysema and mildly enlarged layering right-sided pleural effusion. Electronically Signed: Broderick Reynoso  7/17/2023 4:46 PM EDT  Workstation ID: [id number]    CT Angiogram Chest    Result Date: 7/17/2023  CT ANGIOGRAM CHEST Date of Exam: 7/17/2023 7:05 PM EDT Indication: hypoxia, r/o PE. Comparison:  Chest radiograph from earlier today and CT chest from January 30, 2022 Technique: CTA of the chest was performed after the uneventful intravenous administration of 85 mL Isovue-370. Reconstructed coronal and sagittal images were also obtained. In addition, a 3-D volume rendered image was created for interpretation. Automated exposure control and iterative reconstruction methods were used. Findings: The central tracheobronchial tree is clear. There is advanced emphysema. There are small bilateral pleural effusions. There is a small consolidation in the superior segment of the left lower lobe. The heart is enlarged. The great vessels are normal in caliber. There is no evidence of pulmonary embolus. No abnormally enlarged lymph nodes are identified. Partial evaluation of the upper abdomen demonstrates pneumobilia, likely secondary to prior sphincterotomy. No aggressive osseous lesions are identified. There is a stable mild chronic compression deformity in the lower thoracic spine.     Impression: Impression: 1.Negative for pulmonary embolus. 2.Small consolidation within left lower lobe, likely a mild infectious or inflammatory process. 3.Advanced emphysema. 4.Cardiomegaly. Electronically Signed: Alton Coffman  7/17/2023 7:35 PM EDT  Workstation ID: [id number]     Results for orders placed during the hospital encounter of 01/30/22    Adult Transthoracic Echo Complete W/ Cont if Necessary Per Protocol    Interpretation Summary  · Estimated right ventricular systolic pressure from tricuspid regurgitation is normal (<35 mmHg). Calculated right ventricular systolic pressure from tricuspid regurgitation is 29 mmHg.  · Estimated left ventricular EF = 50% Left ventricular systolic function is normal.      Current medications:  Scheduled Meds:cefTRIAXone, 1,000 mg, Intravenous, Q24H  doxycycline, 100 mg, Intravenous, Q12H  guaiFENesin, 1,200 mg, Oral, Q12H  ipratropium-albuterol, 3 mL, Nebulization, Q4H - RT  pancrelipase  (Lip-Prot-Amyl), 36,000 units of lipase, Oral, TID With Meals  sodium chloride, 10 mL, Intravenous, Q12H      Continuous Infusions:     PRN Meds:.  acetaminophen    calcium carbonate    dextromethorphan polistirex ER    melatonin    ondansetron **OR** ondansetron    sodium chloride    sodium chloride    sodium chloride    Assessment & Plan   Assessment & Plan     Active Hospital Problems    Diagnosis  POA    **COPD exacerbation [J44.1]  Yes    Hypoglycemia [E16.2]  Yes    Acute exacerbation of chronic obstructive pulmonary disease (COPD) [J44.1]  Yes    CAP (community acquired pneumonia) [J18.9]  Yes    Tobacco dependence [F17.200]  Yes    Acute on chronic respiratory failure [J96.20]  Yes    COPD with acute exacerbation [J44.1]  Yes      Resolved Hospital Problems   No resolved problems to display.        Brief Hospital Course to date:  Mary West is a 62 y.o. female with a history of COPD (intermittent home O2 use), chronic pancreatitis, and ongoing tobacco use who presents to [facility] ED for complaint of worsening shortness of breath.     Acute Respiratory Failure w/ Hypoxia  COPD w/ acute exacerbation  Pneumonia  -CTA chest tonight negative for PE, but does show pneumonia of of the left lower lobe.  -Resp PCR negative.  MRSA PCR negative  -Sputum cultures pending  -strep pneumo, legionella ag's negative  -continue Rocephin + Doxycycline day 3  -Duo Nebs q4  -Incentive Spirometer  -now requiring HFNC from baseline of 2LNC qhs only.  Will add IV solumedrol     Tobacco Use  --encouraged patient that absolutely must quit smoking        Expected Discharge Location and Transportation:   Expected Discharge   Expected Discharge Date: 7/21/2023; Expected Discharge Time:      DVT prophylaxis:  Mechanical DVT prophylaxis orders are present.     AM-PAC 6 Clicks Score (PT): 19 (07/19/23 0800)    CODE STATUS:   Code Status and Medical Interventions:   Ordered at: 07/17/23 2130     Code Status (Patient has no pulse and  is not breathing):    CPR (Attempt to Resuscitate)     Medical Interventions (Patient has pulse or is breathing):    Full Support       Sathya Okeefe MD  07/19/23

## 2023-07-19 NOTE — PLAN OF CARE
Goal Outcome Evaluation:  Plan of Care Reviewed With: patient         VSS, SR.  02 via high [place]. Attempted patient on 6L via nasal cannula. Could not keep SA02 >/= 90%. Solumedrol started and IS encouraged. Will continue current plan of care

## 2023-07-20 LAB
ANION GAP SERPL CALCULATED.3IONS-SCNC: 8 MMOL/L (ref 5–15)
BASOPHILS # BLD AUTO: 0.02 10*3/MM3 (ref 0–0.2)
BASOPHILS NFR BLD AUTO: 0.1 % (ref 0–1.5)
BUN SERPL-MCNC: 19 MG/DL (ref 8–23)
BUN/CREAT SERPL: 28.8 (ref 7–25)
CALCIUM SPEC-SCNC: 9 MG/DL (ref 8.6–10.5)
CHLORIDE SERPL-SCNC: 102 MMOL/L (ref 98–107)
CO2 SERPL-SCNC: 28 MMOL/L (ref 22–29)
CREAT SERPL-MCNC: 0.66 MG/DL (ref 0.57–1)
DEPRECATED RDW RBC AUTO: 58.2 FL (ref 37–54)
EGFRCR SERPLBLD CKD-EPI 2021: 99.3 ML/MIN/1.73
EOSINOPHIL # BLD AUTO: 0 10*3/MM3 (ref 0–0.4)
EOSINOPHIL NFR BLD AUTO: 0 % (ref 0.3–6.2)
ERYTHROCYTE [DISTWIDTH] IN BLOOD BY AUTOMATED COUNT: 14.8 % (ref 12.3–15.4)
GLUCOSE SERPL-MCNC: 170 MG/DL (ref 65–99)
HCT VFR BLD AUTO: 47 % (ref 34–46.6)
HGB BLD-MCNC: 14.8 G/DL (ref 12–15.9)
IMM GRANULOCYTES # BLD AUTO: 0.08 10*3/MM3 (ref 0–0.05)
IMM GRANULOCYTES NFR BLD AUTO: 0.5 % (ref 0–0.5)
LYMPHOCYTES # BLD AUTO: 0.61 10*3/MM3 (ref 0.7–3.1)
LYMPHOCYTES NFR BLD AUTO: 4.1 % (ref 19.6–45.3)
MCH RBC QN AUTO: 32.9 PG (ref 26.6–33)
MCHC RBC AUTO-ENTMCNC: 31.5 G/DL (ref 31.5–35.7)
MCV RBC AUTO: 104.4 FL (ref 79–97)
MONOCYTES # BLD AUTO: 0.15 10*3/MM3 (ref 0.1–0.9)
MONOCYTES NFR BLD AUTO: 1 % (ref 5–12)
NEUTROPHILS NFR BLD AUTO: 13.93 10*3/MM3 (ref 1.7–7)
NEUTROPHILS NFR BLD AUTO: 94.3 % (ref 42.7–76)
NRBC BLD AUTO-RTO: 0 /100 WBC (ref 0–0.2)
PLATELET # BLD AUTO: 201 10*3/MM3 (ref 140–450)
PMV BLD AUTO: 10.1 FL (ref 6–12)
POTASSIUM SERPL-SCNC: 5.4 MMOL/L (ref 3.5–5.2)
RBC # BLD AUTO: 4.5 10*6/MM3 (ref 3.77–5.28)
SODIUM SERPL-SCNC: 138 MMOL/L (ref 136–145)
WBC NRBC COR # BLD: 14.79 10*3/MM3 (ref 3.4–10.8)

## 2023-07-20 PROCEDURE — 85025 COMPLETE CBC W/AUTO DIFF WBC: CPT | Performed by: PHYSICIAN ASSISTANT

## 2023-07-20 PROCEDURE — 99232 SBSQ HOSP IP/OBS MODERATE 35: CPT | Performed by: INTERNAL MEDICINE

## 2023-07-20 PROCEDURE — 25010000002 ONDANSETRON PER 1 MG: Performed by: PHYSICIAN ASSISTANT

## 2023-07-20 PROCEDURE — 25010000002 METHYLPREDNISOLONE PER 40 MG: Performed by: INTERNAL MEDICINE

## 2023-07-20 PROCEDURE — 94799 UNLISTED PULMONARY SVC/PX: CPT

## 2023-07-20 PROCEDURE — 25010000002 CEFTRIAXONE PER 250 MG: Performed by: PHYSICIAN ASSISTANT

## 2023-07-20 PROCEDURE — 94664 DEMO&/EVAL PT USE INHALER: CPT

## 2023-07-20 PROCEDURE — 94761 N-INVAS EAR/PLS OXIMETRY MLT: CPT

## 2023-07-20 PROCEDURE — 80048 BASIC METABOLIC PNL TOTAL CA: CPT | Performed by: PHYSICIAN ASSISTANT

## 2023-07-20 RX ORDER — OXYCODONE HYDROCHLORIDE AND ACETAMINOPHEN 5; 325 MG/1; MG/1
1 TABLET ORAL EVERY 8 HOURS PRN
Status: DISCONTINUED | OUTPATIENT
Start: 2023-07-20 | End: 2023-07-23 | Stop reason: HOSPADM

## 2023-07-20 RX ADMIN — DOXYCYCLINE 100 MG: 100 INJECTION, POWDER, LYOPHILIZED, FOR SOLUTION INTRAVENOUS at 12:36

## 2023-07-20 RX ADMIN — ONDANSETRON 4 MG: 2 INJECTION INTRAMUSCULAR; INTRAVENOUS at 22:31

## 2023-07-20 RX ADMIN — IPRATROPIUM BROMIDE AND ALBUTEROL SULFATE 3 ML: .5; 2.5 SOLUTION RESPIRATORY (INHALATION) at 07:07

## 2023-07-20 RX ADMIN — ACETAMINOPHEN 650 MG: 325 TABLET ORAL at 08:59

## 2023-07-20 RX ADMIN — Medication 10 ML: at 08:55

## 2023-07-20 RX ADMIN — Medication 10 ML: at 20:43

## 2023-07-20 RX ADMIN — PANCRELIPASE 36000 UNITS OF LIPASE: 60000; 12000; 38000 CAPSULE, DELAYED RELEASE PELLETS ORAL at 12:36

## 2023-07-20 RX ADMIN — METHYLPREDNISOLONE SODIUM SUCCINATE 60 MG: 40 INJECTION, POWDER, LYOPHILIZED, FOR SOLUTION INTRAMUSCULAR; INTRAVENOUS at 21:16

## 2023-07-20 RX ADMIN — PANCRELIPASE 36000 UNITS OF LIPASE: 60000; 12000; 38000 CAPSULE, DELAYED RELEASE PELLETS ORAL at 08:54

## 2023-07-20 RX ADMIN — OXYCODONE HYDROCHLORIDE AND ACETAMINOPHEN 1 TABLET: 5; 325 TABLET ORAL at 12:36

## 2023-07-20 RX ADMIN — GUAIFENESIN 1200 MG: 600 TABLET, EXTENDED RELEASE ORAL at 08:54

## 2023-07-20 RX ADMIN — IPRATROPIUM BROMIDE AND ALBUTEROL SULFATE 3 ML: .5; 2.5 SOLUTION RESPIRATORY (INHALATION) at 16:26

## 2023-07-20 RX ADMIN — GUAIFENESIN 1200 MG: 600 TABLET, EXTENDED RELEASE ORAL at 20:43

## 2023-07-20 RX ADMIN — IPRATROPIUM BROMIDE AND ALBUTEROL SULFATE 3 ML: .5; 2.5 SOLUTION RESPIRATORY (INHALATION) at 23:56

## 2023-07-20 RX ADMIN — SODIUM CHLORIDE 1000 MG: 900 INJECTION INTRAVENOUS at 20:42

## 2023-07-20 RX ADMIN — PANCRELIPASE 36000 UNITS OF LIPASE: 60000; 12000; 38000 CAPSULE, DELAYED RELEASE PELLETS ORAL at 17:16

## 2023-07-20 RX ADMIN — METHYLPREDNISOLONE SODIUM SUCCINATE 60 MG: 40 INJECTION, POWDER, LYOPHILIZED, FOR SOLUTION INTRAMUSCULAR; INTRAVENOUS at 05:19

## 2023-07-20 RX ADMIN — OXYCODONE HYDROCHLORIDE AND ACETAMINOPHEN 1 TABLET: 5; 325 TABLET ORAL at 20:43

## 2023-07-20 RX ADMIN — METHYLPREDNISOLONE SODIUM SUCCINATE 60 MG: 40 INJECTION, POWDER, LYOPHILIZED, FOR SOLUTION INTRAMUSCULAR; INTRAVENOUS at 14:53

## 2023-07-20 RX ADMIN — IPRATROPIUM BROMIDE AND ALBUTEROL SULFATE 3 ML: .5; 2.5 SOLUTION RESPIRATORY (INHALATION) at 19:19

## 2023-07-20 RX ADMIN — DOXYCYCLINE 100 MG: 100 INJECTION, POWDER, LYOPHILIZED, FOR SOLUTION INTRAVENOUS at 22:28

## 2023-07-20 RX ADMIN — IPRATROPIUM BROMIDE AND ALBUTEROL SULFATE 3 ML: .5; 2.5 SOLUTION RESPIRATORY (INHALATION) at 11:19

## 2023-07-20 NOTE — PLAN OF CARE
Goal Outcome Evaluation:  Plan of Care Reviewed With: patient        Progress: no change  Outcome Evaluation: VSS. Taken off high flow this morning. Currently on 6L NC. Was on 5 L NC for a couple hours and titrated back up due to O2 at 87%. Working on IS and flutter valve frequently through out shift. Out of bed and in chair. PRN administerd for complaints of chronic abd pain.  Unable to obtain sputum specimen, pt states she is not coughing anything up at this time.

## 2023-07-20 NOTE — CASE MANAGEMENT/SOCIAL WORK
Continued Stay Note  [facility]     Patient Name: Mary West  MRN: [id number]  Today's Date: 7/20/2023    Admit Date: 7/17/2023    Plan: Home at discharge   Discharge Plan       Row Name 07/20/23 1827       Plan    Plan Home at discharge    Plan Comments Case Management continues to follow for all needs/discharge planning, will arrange PCP at discharge.  Valentina Castro, Ext. [phone]    Final Discharge Disposition Code 01 - home or self-care                   Discharge Codes    No documentation.                 Expected Discharge Date and Time       Expected Discharge Date Expected Discharge Time    Jul 21, 2023               CARLOS Estes

## 2023-07-20 NOTE — PROGRESS NOTES
[facility] Medicine Services  PROGRESS NOTE    Patient Name: Mary West  : 1960  MRN: [id number]    Date of Admission: 2023  Primary Care Physician: Provider, No Known    Subjective   Subjective     CC:  SOA    HPI:  Feels better.  Wants to know when can go home.  Has been weaned from HFNC down to 6LNC.       ROS:  Gen- No fevers, chills  CV- No chest pain, palpitations  Resp- No cough, dyspnea  GI- No N/V/D, abd pain      Objective   Objective     Vital Signs:   Temp:  [98.1 °F (36.7 °C)-98.7 °F (37.1 °C)] 98.3 °F (36.8 °C)  Heart Rate:  [64-83] 71  Resp:  [16-20] 16  BP: ()/(61-75) 98/66  Flow (L/min):  [6-40] 6     Physical Exam:  Constitutional: No acute distress, awake, alert  HENT: NCAT, mucous membranes moist  Respiratory: decreased air movement throughout lung fields, respiratory effort normal on 6LNC  Cardiovascular: RRR, no murmurs, rubs, or gallops  Gastrointestinal: Positive bowel sounds, soft, nontender, nondistended  Musculoskeletal: No bilateral ankle edema  Psychiatric: Appropriate affect, cooperative  Neurologic: Oriented x 3, strength symmetric in all extremities, Cranial Nerves grossly intact to confrontation, speech clear  Skin: No rashes      Results Reviewed:  LAB RESULTS:      Lab 23  0806 23  1023 23  0535 23  1708   WBC 14.79* 7.00 4.54 7.20   HEMOGLOBIN 14.8 13.9 14.4 16.2*   HEMATOCRIT 47.0* 44.5 47.0* 51.2*   PLATELETS 201 177 179 209   NEUTROS ABS 13.93* 5.35 3.62 4.51   IMMATURE GRANS (ABS) 0.08* 0.03 0.05 0.01   LYMPHS ABS 0.61* 1.08 0.73 2.08   MONOS ABS 0.15 0.41 0.11 0.45   EOS ABS 0.00 0.07 0.01 0.08   .4* 104.0* 108.3* 104.7*   PROCALCITONIN  --   --   --  0.02   LACTATE  --   --   --  1.4         Lab 23  0806 23  1023 23  0543 23  1708   SODIUM 138 143 141 145   POTASSIUM 5.4* 4.4 4.6 4.1   CHLORIDE 102 109* 107 109*   CO2 28.0 24.0 24.0 28.0   ANION GAP 8.0 10.0 10.0 8.0    BUN 19 18 17 16   CREATININE 0.66 0.50* 0.57 0.59   EGFR 99.3 106.2 102.9 102.0   GLUCOSE 170* 106* 129* 58*   CALCIUM 9.0 8.7 8.7 9.0         Lab 07/17/23  1708   TOTAL PROTEIN 6.6   ALBUMIN 4.1   GLOBULIN 2.5   ALT (SGPT) 12   AST (SGOT) 23   BILIRUBIN 0.7   ALK PHOS 109         Lab 07/17/23  1708   PROBNP 6,751.0*   HSTROP T 18*         Lab 07/18/23  0543   CHOLESTEROL 108   LDL CHOL 58   HDL CHOL 37*   TRIGLYCERIDES 54             Lab 07/17/23  2048   PH, ARTERIAL 7.376   PCO2, ARTERIAL 44.8   PO2 ART 53.0*   FIO2 36   HCO3 ART 26.2*   BASE EXCESS ART 0.6   CARBOXYHEMOGLOBIN 4.4*     Brief Urine Lab Results       None            Microbiology Results Abnormal       Procedure Component Value - Date/Time    Blood Culture - Blood, Hand, Left [[id number]]  (Normal) Collected: 07/17/23 1726    Lab Status: Preliminary result Specimen: Blood from Hand, Left Updated: 07/19/23 1745     Blood Culture No growth at 2 days    Blood Culture - Blood, Arm, Right [[id number]]  (Normal) Collected: 07/17/23 1705    Lab Status: Preliminary result Specimen: Blood from Arm, Right Updated: 07/19/23 1731     Blood Culture No growth at 2 days    Legionella Antigen, Urine - Urine, Urine, Clean Catch [[id number]]  (Normal) Collected: 07/18/23 0116    Lab Status: Final result Specimen: Urine, Clean Catch Updated: 07/18/23 0933     LEGIONELLA ANTIGEN, URINE Negative    S. Pneumo Ag Urine or CSF - Urine, Urine, Clean Catch [[id number]]  (Normal) Collected: 07/18/23 0116    Lab Status: Final result Specimen: Urine, Clean Catch Updated: 07/18/23 0933     Strep Pneumo Ag Negative    MRSA Screen, PCR (Inpatient) - Swab, Nares [[id number]]  (Normal) Collected: 07/17/23 2351    Lab Status: Final result Specimen: Swab from Nares Updated: 07/18/23 0843     MRSA PCR Negative    Narrative:      The negative predictive value of this diagnostic test is high and should only be used to consider de-escalating anti-MRSA therapy. A positive result may indicate  colonization with MRSA and must be correlated clinically.  MRSA Negative    Respiratory Panel PCR w/COVID-19(SARS-CoV-2) JONAS/RINA/NAFISA/PAD/COR/MAD/SALOME In-House, NP Swab in UTM/VTM, 3-4 HR TAT - Swab, Nasopharynx [[id number]]  (Normal) Collected: 07/17/23 9432    Lab Status: Final result Specimen: Swab from Nasopharynx Updated: 07/18/23 0103     ADENOVIRUS, PCR Not Detected     Coronavirus 229E Not Detected     Coronavirus HKU1 Not Detected     Coronavirus NL63 Not Detected     Coronavirus OC43 Not Detected     COVID19 Not Detected     Human Metapneumovirus Not Detected     Human Rhinovirus/Enterovirus Not Detected     Influenza A PCR Not Detected     Influenza B PCR Not Detected     Parainfluenza Virus 1 Not Detected     Parainfluenza Virus 2 Not Detected     Parainfluenza Virus 3 Not Detected     Parainfluenza Virus 4 Not Detected     RSV, PCR Not Detected     Bordetella pertussis pcr Not Detected     Bordetella parapertussis PCR Not Detected     Chlamydophila pneumoniae PCR Not Detected     Mycoplasma pneumo by PCR Not Detected    Narrative:      In the setting of a positive respiratory panel with a viral infection PLUS a negative procalcitonin without other underlying concern for bacterial infection, consider observing off antibiotics or discontinuation of antibiotics and continue supportive care. If the respiratory panel is positive for atypical bacterial infection (Bordetella pertussis, Chlamydophila pneumoniae, or Mycoplasma pneumoniae), consider antibiotic de-escalation to target atypical bacterial infection.            No radiology results from the last 24 hrs    Results for orders placed during the hospital encounter of 01/30/22    Adult Transthoracic Echo Complete W/ Cont if Necessary Per Protocol    Interpretation Summary  · Estimated right ventricular systolic pressure from tricuspid regurgitation is normal (<35 mmHg). Calculated right ventricular systolic pressure from tricuspid regurgitation is 29  mmHg.  · Estimated left ventricular EF = 50% Left ventricular systolic function is normal.      Current medications:  Scheduled Meds:cefTRIAXone, 1,000 mg, Intravenous, Q24H  doxycycline, 100 mg, Intravenous, Q12H  guaiFENesin, 1,200 mg, Oral, Q12H  ipratropium-albuterol, 3 mL, Nebulization, Q4H - RT  methylPREDNISolone sodium succinate, 60 mg, Intravenous, Q8H  pancrelipase (Lip-Prot-Amyl), 36,000 units of lipase, Oral, TID With Meals  sodium chloride, 10 mL, Intravenous, Q12H      Continuous Infusions:     PRN Meds:.  acetaminophen    calcium carbonate    dextromethorphan polistirex ER    melatonin    ondansetron **OR** ondansetron    oxyCODONE-acetaminophen    sodium chloride    sodium chloride    sodium chloride    Assessment & Plan   Assessment & Plan     Active Hospital Problems    Diagnosis  POA    **COPD exacerbation [J44.1]  Yes    Severe malnutrition [E43]  Yes    Hypoglycemia [E16.2]  Yes    Acute exacerbation of chronic obstructive pulmonary disease (COPD) [J44.1]  Yes    CAP (community acquired pneumonia) [J18.9]  Yes    Tobacco dependence [F17.200]  Yes    Acute on chronic respiratory failure [J96.20]  Yes    COPD with acute exacerbation [J44.1]  Yes      Resolved Hospital Problems   No resolved problems to display.        Brief Hospital Course to date:  Mary West is a 62 y.o. female with a history of COPD (intermittent home O2 use), chronic pancreatitis, and ongoing tobacco use who presents to [facility] ED for complaint of worsening shortness of breath.     Acute Respiratory Failure w/ Hypoxia  COPD w/ acute exacerbation  Pneumonia  -CTA chest tonight negative for PE, but does show pneumonia of of the left lower lobe.  -Resp PCR negative.  MRSA PCR negative  -Sputum cultures pending  -strep pneumo, legionella ag's negative  -continue Rocephin + Doxycycline day 4  -Duo Nebs q4  -Incentive Spirometer  - baseline of 2LNC qhs only.  Weaned down from HFNC to 6LNC.  continue IV solumedrol day  2  -encouraged good pulmonary toilet.  Will add IS/flutter valve, instructed to get out of bed to chair     Tobacco Use  --encouraged patient that absolutely must quit smoking        Expected Discharge Location and Transportation:   Expected Discharge   Expected Discharge Date: 7/21/2023; Expected Discharge Time:      DVT prophylaxis:  Mechanical DVT prophylaxis orders are present.     AM-PAC 6 Clicks Score (PT): 23 (07/20/23 0859)    CODE STATUS:   Code Status and Medical Interventions:   Ordered at: 07/17/23 4949     Code Status (Patient has no pulse and is not breathing):    CPR (Attempt to Resuscitate)     Medical Interventions (Patient has pulse or is breathing):    Full Support       Sathya Okeefe MD  07/20/23

## 2023-07-20 NOTE — PLAN OF CARE
Goal Outcome Evaluation:              Outcome Evaluation: Patient continues on Hi-anthony via NC, has voiced no needs or concerns, NAD noted.

## 2023-07-20 NOTE — CONSULTS
NN spoke with pt at .  Pt alert and able to answer questions appropriately. Pt O2 sat  91% on  6 L currently, home O2 use  2L HS. COPD action plan reviewed. Deep breathing exercises encouraged. No new concerns or questions voiced at this time.  NN will continue to follow as needed.        Per current GOLD Standards, please consider:  LAMA/LABA/ICS in place (Trelegy), Outpatient PFT, Rehab as appropriate, Palliative Care consult, NRT at [place], Annual LDCT per current screening guidelines (age 50-80 years old, smoking history of 20 pack years or more or has quit within past 15 years)      The patient reports that she is taking samples of albuterol and Trelegy.  She also reports her home O2 has ran out and she has no PCP.

## 2023-07-21 LAB
ANION GAP SERPL CALCULATED.3IONS-SCNC: 10 MMOL/L (ref 5–15)
BACTERIA SPEC RESP CULT: NORMAL
BUN SERPL-MCNC: 23 MG/DL (ref 8–23)
BUN/CREAT SERPL: 41.1 (ref 7–25)
CALCIUM SPEC-SCNC: 9.5 MG/DL (ref 8.6–10.5)
CHLORIDE SERPL-SCNC: 99 MMOL/L (ref 98–107)
CO2 SERPL-SCNC: 28 MMOL/L (ref 22–29)
CREAT SERPL-MCNC: 0.56 MG/DL (ref 0.57–1)
DEPRECATED RDW RBC AUTO: 55.8 FL (ref 37–54)
EGFRCR SERPLBLD CKD-EPI 2021: 103.3 ML/MIN/1.73
ERYTHROCYTE [DISTWIDTH] IN BLOOD BY AUTOMATED COUNT: 14.6 % (ref 12.3–15.4)
GLUCOSE SERPL-MCNC: 174 MG/DL (ref 65–99)
GRAM STN SPEC: NORMAL
HCT VFR BLD AUTO: 48.1 % (ref 34–46.6)
HGB BLD-MCNC: 15.6 G/DL (ref 12–15.9)
MCH RBC QN AUTO: 33.2 PG (ref 26.6–33)
MCHC RBC AUTO-ENTMCNC: 32.4 G/DL (ref 31.5–35.7)
MCV RBC AUTO: 102.3 FL (ref 79–97)
PLATELET # BLD AUTO: 245 10*3/MM3 (ref 140–450)
PMV BLD AUTO: 10.1 FL (ref 6–12)
POTASSIUM SERPL-SCNC: 5.5 MMOL/L (ref 3.5–5.2)
RBC # BLD AUTO: 4.7 10*6/MM3 (ref 3.77–5.28)
SODIUM SERPL-SCNC: 137 MMOL/L (ref 136–145)
WBC NRBC COR # BLD: 15.45 10*3/MM3 (ref 3.4–10.8)

## 2023-07-21 PROCEDURE — 25010000002 CEFTRIAXONE PER 250 MG: Performed by: PHYSICIAN ASSISTANT

## 2023-07-21 PROCEDURE — 80048 BASIC METABOLIC PNL TOTAL CA: CPT | Performed by: INTERNAL MEDICINE

## 2023-07-21 PROCEDURE — 25010000002 ONDANSETRON PER 1 MG: Performed by: PHYSICIAN ASSISTANT

## 2023-07-21 PROCEDURE — 94664 DEMO&/EVAL PT USE INHALER: CPT

## 2023-07-21 PROCEDURE — 87205 SMEAR GRAM STAIN: CPT | Performed by: PHYSICIAN ASSISTANT

## 2023-07-21 PROCEDURE — 94799 UNLISTED PULMONARY SVC/PX: CPT

## 2023-07-21 PROCEDURE — 99232 SBSQ HOSP IP/OBS MODERATE 35: CPT | Performed by: INTERNAL MEDICINE

## 2023-07-21 PROCEDURE — 25010000002 METHYLPREDNISOLONE PER 40 MG: Performed by: INTERNAL MEDICINE

## 2023-07-21 PROCEDURE — 85027 COMPLETE CBC AUTOMATED: CPT | Performed by: INTERNAL MEDICINE

## 2023-07-21 PROCEDURE — 94761 N-INVAS EAR/PLS OXIMETRY MLT: CPT

## 2023-07-21 PROCEDURE — 97162 PT EVAL MOD COMPLEX 30 MIN: CPT

## 2023-07-21 RX ADMIN — GUAIFENESIN 1200 MG: 600 TABLET, EXTENDED RELEASE ORAL at 08:37

## 2023-07-21 RX ADMIN — PANCRELIPASE 36000 UNITS OF LIPASE: 60000; 12000; 38000 CAPSULE, DELAYED RELEASE PELLETS ORAL at 11:58

## 2023-07-21 RX ADMIN — ONDANSETRON 4 MG: 2 INJECTION INTRAMUSCULAR; INTRAVENOUS at 17:48

## 2023-07-21 RX ADMIN — SODIUM CHLORIDE 1000 MG: 900 INJECTION INTRAVENOUS at 21:56

## 2023-07-21 RX ADMIN — METHYLPREDNISOLONE SODIUM SUCCINATE 60 MG: 40 INJECTION, POWDER, LYOPHILIZED, FOR SOLUTION INTRAMUSCULAR; INTRAVENOUS at 13:47

## 2023-07-21 RX ADMIN — ACETAMINOPHEN 650 MG: 325 TABLET ORAL at 21:56

## 2023-07-21 RX ADMIN — DOXYCYCLINE 100 MG: 100 INJECTION, POWDER, LYOPHILIZED, FOR SOLUTION INTRAVENOUS at 11:58

## 2023-07-21 RX ADMIN — DOXYCYCLINE 100 MG: 100 INJECTION, POWDER, LYOPHILIZED, FOR SOLUTION INTRAVENOUS at 23:22

## 2023-07-21 RX ADMIN — METHYLPREDNISOLONE SODIUM SUCCINATE 60 MG: 40 INJECTION, POWDER, LYOPHILIZED, FOR SOLUTION INTRAMUSCULAR; INTRAVENOUS at 05:02

## 2023-07-21 RX ADMIN — IPRATROPIUM BROMIDE AND ALBUTEROL SULFATE 3 ML: .5; 2.5 SOLUTION RESPIRATORY (INHALATION) at 15:58

## 2023-07-21 RX ADMIN — IPRATROPIUM BROMIDE AND ALBUTEROL SULFATE 3 ML: .5; 2.5 SOLUTION RESPIRATORY (INHALATION) at 07:02

## 2023-07-21 RX ADMIN — IPRATROPIUM BROMIDE AND ALBUTEROL SULFATE 3 ML: .5; 2.5 SOLUTION RESPIRATORY (INHALATION) at 23:26

## 2023-07-21 RX ADMIN — GUAIFENESIN 1200 MG: 600 TABLET, EXTENDED RELEASE ORAL at 21:56

## 2023-07-21 RX ADMIN — ONDANSETRON 4 MG: 2 INJECTION INTRAMUSCULAR; INTRAVENOUS at 04:35

## 2023-07-21 RX ADMIN — PANCRELIPASE 36000 UNITS OF LIPASE: 60000; 12000; 38000 CAPSULE, DELAYED RELEASE PELLETS ORAL at 17:39

## 2023-07-21 RX ADMIN — METHYLPREDNISOLONE SODIUM SUCCINATE 60 MG: 40 INJECTION, POWDER, LYOPHILIZED, FOR SOLUTION INTRAMUSCULAR; INTRAVENOUS at 22:12

## 2023-07-21 RX ADMIN — IPRATROPIUM BROMIDE AND ALBUTEROL SULFATE 3 ML: .5; 2.5 SOLUTION RESPIRATORY (INHALATION) at 03:42

## 2023-07-21 RX ADMIN — Medication 5 MG: at 21:56

## 2023-07-21 RX ADMIN — PANCRELIPASE 36000 UNITS OF LIPASE: 60000; 12000; 38000 CAPSULE, DELAYED RELEASE PELLETS ORAL at 08:37

## 2023-07-21 RX ADMIN — OXYCODONE HYDROCHLORIDE AND ACETAMINOPHEN 1 TABLET: 5; 325 TABLET ORAL at 04:35

## 2023-07-21 RX ADMIN — IPRATROPIUM BROMIDE AND ALBUTEROL SULFATE 3 ML: .5; 2.5 SOLUTION RESPIRATORY (INHALATION) at 10:58

## 2023-07-21 RX ADMIN — OXYCODONE HYDROCHLORIDE AND ACETAMINOPHEN 1 TABLET: 5; 325 TABLET ORAL at 17:48

## 2023-07-21 NOTE — PLAN OF CARE
Goal Outcome Evaluation:  Plan of Care Reviewed With: patient           Outcome Evaluation: Pt presents with weakness, decreased activity ted, and difficulty walking and is below baseline level of function for mobility. Pt amb 2 x 90ft without assistive device with CGA. Pt on NRB and able to maintain sats ~90%. Pt mildly unsteady, however no overt LOB. Seated rest needed in between. PT rec d/c home with outpatient pulmonary rehab.      Anticipated Discharge Disposition (PT): home with outpatient therapy services

## 2023-07-21 NOTE — PLAN OF CARE
Goal Outcome Evaluation:  Plan of Care Reviewed With: patient        Progress: improving  Outcome Evaluation: a/o x 4; VSS, 6L n.c. overnight - has remained off of HFNC; PRNs for symtoms control; no acute changes to report from overnight; will continue POC      Problem: Adjustment to Illness COPD (Chronic Obstructive Pulmonary Disease)  Goal: Optimal Chronic Illness Coping  Outcome: Ongoing, Progressing  Intervention: Support and Optimize Psychosocial Response  Recent Flowsheet Documentation  Taken 7/20/2023 1937 by Jayesh Lopez RN  Supportive Measures: active listening utilized  Family/Support System Care:   self-care encouraged   support provided     Problem: Respiratory Compromise COPD (Chronic Obstructive Pulmonary Disease)  Goal: Effective Oxygenation and Ventilation  Intervention: Optimize Oxygenation and Ventilation  Recent Flowsheet Documentation  Taken 7/21/2023 0400 by Jayesh Lopez RN  Head of Bed (HOB) Positioning: HOB elevated  Taken 7/21/2023 0200 by Jayesh Lopez RN  Head of Bed (HOB) Positioning: HOB elevated  Taken 7/20/2023 2357 by Jayesh Lopez RN  Head of Bed (HOB) Positioning: HOB elevated  Taken 7/20/2023 2200 by Jayesh Lopez RN  Head of Bed (HOB) Positioning: HOB elevated  Taken 7/20/2023 1937 by Jayesh Lopez RN  Head of Bed (HOB) Positioning: HOB elevated  Airway/Ventilation Management:   airway patency maintained   humidification applied     Problem: Adult Inpatient Plan of Care  Goal: Absence of Hospital-Acquired Illness or Injury  Intervention: Identify and Manage Fall Risk  Recent Flowsheet Documentation  Taken 7/21/2023 0400 by Jayesh Lopez RN  Safety Promotion/Fall Prevention:   assistive device/personal items within reach   clutter free environment maintained   activity supervised   fall prevention program maintained   nonskid shoes/slippers when out of bed   room organization consistent   safety round/check completed  Taken 7/21/2023 0200  by Jayesh Lopez, RN  Safety Promotion/Fall Prevention:   activity supervised   assistive device/personal items within reach   clutter free environment maintained   fall prevention program maintained   nonskid shoes/slippers when out of bed   room organization consistent   safety round/check completed  Taken 7/20/2023 2357 by Jayesh Lopez, RN  Safety Promotion/Fall Prevention:   assistive device/personal items within reach   clutter free environment maintained   activity supervised   fall prevention program maintained   nonskid shoes/slippers when out of bed   room organization consistent   safety round/check completed  Taken 7/20/2023 2200 by Jayesh Lopez, RN  Safety Promotion/Fall Prevention:   clutter free environment maintained   activity supervised   assistive device/personal items within reach   fall prevention program maintained   nonskid shoes/slippers when out of bed   room organization consistent   safety round/check completed  Taken 7/20/2023 1937 by Jayesh Lopez, RN  Safety Promotion/Fall Prevention:   clutter free environment maintained   assistive device/personal items within reach   activity supervised   fall prevention program maintained   nonskid shoes/slippers when out of bed   room organization consistent   safety round/check completed

## 2023-07-21 NOTE — CASE MANAGEMENT/SOCIAL WORK
Continued Stay Note  [facility]     Patient Name: Mary West  MRN: [id number]  Today's Date: 7/21/2023    Admit Date: 7/17/2023    Plan: Home   Discharge Plan       Row Name 07/21/23 1101       Plan    Plan Home    Patient/Family in Agreement with Plan yes    Plan Comments Patient is now on O2 at 6L per NC.  Not medically ready for discharge.  CM will continue to follow for discharge needs.  Plan is home    Final Discharge Disposition Code 01 - home or self-care                   Discharge Codes    No documentation.                 Expected Discharge Date and Time       Expected Discharge Date Expected Discharge Time    Jul 21, 2023               Katie Dawkins RN

## 2023-07-21 NOTE — THERAPY EVALUATION
Patient Name: Mary West  : 1960    MRN: [id number]                              Today's Date: 2023       Admit Date: 2023    Visit Dx:     ICD-10-CM ICD-9-CM   1. COPD with acute exacerbation  J44.1 491.21   2. Hypoxia  R09.02 799.02   3. Pneumonia of left lower lobe due to infectious organism  J18.9 486   4. Tobacco abuse  Z72.0 305.1   5. Impaired functional mobility, balance, gait, and endurance  Z74.09 V49.89     Patient Active Problem List   Diagnosis    COVID-19    COPD with acute exacerbation    Acute on chronic respiratory failure    Acute respiratory failure due to COVID-19    Remote history alcohol abuse    Tobacco dependence    CAP (community acquired pneumonia)    Chronic Pancreatitis    Chronic narcotic use    Cytokine release syndrome, grade 2    Hypoglycemia    COPD exacerbation    Acute exacerbation of chronic obstructive pulmonary disease (COPD)    Severe malnutrition     Past Medical History:   Diagnosis Date    Alcohol abuse     Quit early     COPD (chronic obstructive pulmonary disease)     Pancreatitis      History reviewed. No pertinent surgical history.   General Information       Row Name 23 0839          Physical Therapy Time and Intention    Document Type evaluation  -SD     Mode of Treatment individual therapy;physical therapy  -SD       Row Name 23 0839          General Information    Patient Profile Reviewed yes  -SD     Prior Level of Function independent:;all household mobility;community mobility;gait;transfer;bed mobility;ADL's;work;using stairs;driving  works full time at the [employer]  -SD     Existing Precautions/Restrictions oxygen therapy device and L/min  -SD     Barriers to Rehab medically complex  -SD       Row Name 23 0839          Living Environment    People in Home alone  -SD       Row Name 23 0839          Home Main Entrance    Number of Stairs, Main Entrance none  -SD       Row Name 23 0839          Stairs  Within Home, Primary    Stairs, Within Home, Primary 2 story home, bed and bath on 2nd level  -SD       Row Name 07/21/23 0839          Cognition    Orientation Status (Cognition) oriented x 4  -SD       Row Name 07/21/23 0839          Safety Issues, Functional Mobility    Safety Issues Affecting Function (Mobility) insight into deficits/self-awareness  -SD     Impairments Affecting Function (Mobility) balance;endurance/activity tolerance;strength;pain  -SD     Comment, Safety Issues/Impairments (Mobility) Non-rebreather for mobility  -SD               User Key  (r) = Recorded By, (t) = Taken By, (c) = Cosigned By      Initials Name Provider Type    Lyn Alvares PT Physical Therapist                   Mobility       Row Name 07/21/23 0945          Bed Mobility    Bed Mobility supine-sit  -SD     Supine-Sit [place] (Bed Mobility) modified independence  -SD     Comment, (Bed Mobility) pt able to perform without difficulty  -SD       Row Name 07/21/23 0945          Transfers    Comment, (Transfers) pt given cues for safety with lines  -SD       Row Name 07/21/23 0945          Sit-Stand Transfer    Sit-Stand [place] (Transfers) independent  -SD       Row Name 07/21/23 0945          Gait/Stairs (Locomotion)    [place] Level (Gait) contact guard;1 person assist;verbal cues  -SD     Distance in Feet (Gait) 2 x 90ft  -SD     Deviations/Abnormal Patterns (Gait) base of support, narrow;ataxic  -SD     Bilateral Gait Deviations decreased arm swing  -SD     Comment, (Gait/Stairs) Pt amb 2 x 90ft without assistive device with CGA. Pt on NRB and able to maintain sats ~90%. Pt mildly unsteady, however no overt LOB. Seated rest needed in between.  -SD               User Key  (r) = Recorded By, (t) = Taken By, (c) = Cosigned By      Initials Name Provider Type    Lyn Alvares PT Physical Therapist                   Obj/Interventions       Row Name 07/21/23 0959          Range of Motion  Comprehensive    General Range of Motion bilateral lower extremity ROM WFL  -SD       Row Name 07/21/23 0965          Strength Comprehensive (MMT)    General Manual Muscle Testing (MMT) Assessment lower extremity strength deficits identified  -SD     Comment, General Manual Muscle Testing (MMT) Assessment BLE's 4+/5  -SD       Row Name 07/21/23 0942          Balance    Balance Assessment sitting static balance;sitting dynamic balance;standing static balance  -SD     Static Sitting Balance independent  -SD     Dynamic Sitting Balance independent  -SD     Position, Sitting Balance unsupported;sitting edge of bed  -SD     Static Standing Balance supervision  -SD               User Key  (r) = Recorded By, (t) = Taken By, (c) = Cosigned By      Initials Name Provider Type    SD Lyn Howell, PT Physical Therapist                   Goals/Plan       Row Name 07/21/23 0959          Transfer Goal 1 (PT)    Activity/Assistive Device (Transfer Goal 1, PT) sit-to-stand/stand-to-sit;bed-to-chair/chair-to-bed  -SD     [place] Level/Cues Needed (Transfer Goal 1, PT) independent  -SD     Time Frame (Transfer Goal 1, PT) long term goal (LTG);2 weeks  -SD       Row Name 07/21/23 0950          Gait Training Goal 1 (PT)    Activity/Assistive Device (Gait Training Goal 1, PT) gait (walking locomotion);increase endurance/gait distance  -SD     [place] Level (Gait Training Goal 1, PT) independent  -SD     Distance (Gait Training Goal 1, PT) 400  -SD     Time Frame (Gait Training Goal 1, PT) long term goal (LTG);2 weeks  -SD       Row Name 07/21/23 0934          Stairs Goal 1 (PT)    Activity/Assistive Device (Stairs Goal 1, PT) ascending stairs;descending stairs  -SD     [place] Level/Cues Needed (Stairs Goal 1, PT) modified independence  -SD     Number of Stairs (Stairs Goal 1, PT) 1 flight  -SD     Time Frame (Stairs Goal 1, PT) long term goal (LTG);2 weeks  -SD       Row Name 07/21/23 0961          Therapy  Assessment/Plan (PT)    Planned Therapy Interventions (PT) balance training;bed mobility training;gait training;home exercise program;patient/family education;transfer training;neuromuscular re-education;stair training;strengthening  -SD               User Key  (r) = Recorded By, (t) = Taken By, (c) = Cosigned By      Initials Name Provider Type    Lyn Alvares, PT Physical Therapist                   Clinical Impression       Row Name 07/21/23 0948          Pain    Pain Intervention(s) Ambulation/increased activity;Repositioned;Environmental changes  -SD     Additional Documentation Pain Scale: FACES Pre/Post-Treatment (Group)  -SD       Row Name 07/21/23 0948          Pain Scale: FACES Pre/Post-Treatment    Pain: FACES Scale, Pretreatment 4-->hurts little more  -SD     Posttreatment Pain Rating 4-->hurts little more  -SD     Pain Location - abdomen  -SD       [facility] Name 07/21/23 0948          Plan of Care Review    Plan of Care Reviewed With patient  -SD     Outcome Evaluation Pt presents with weakness, decreased activity ted, and difficulty walking and is below baseline level of function for mobility. Pt amb 2 x 90ft without assistive device with CGA. Pt on NRB and able to maintain sats ~90%. Pt mildly unsteady, however no overt LOB. Seated rest needed in between. PT rec d/c home with outpatient pulmonary rehab.  -SD       [facility] Name 07/21/23 0948          Therapy Assessment/Plan (PT)    Patient/Family Therapy Goals Statement (PT) walk more frequently  -SD     Rehab Potential (PT) good, to achieve stated therapy goals  -SD     Criteria for Skilled Interventions Met (PT) yes;skilled treatment is necessary  -SD     Therapy Frequency (PT) daily  -SD     Predicted Duration of Therapy Intervention (PT) 2wks  -SD       Row Name 07/21/23 0948          Vital Signs    Pre Systolic BP Rehab 112  -SD     Pre Treatment Diastolic BP 74  -SD     Post Systolic BP Rehab 121  -SD     Post Treatment Diastolic BP 74  -SD      Pretreatment Heart Rate (beats/min) 75  -SD     Posttreatment Heart Rate (beats/min) 73  -SD     Pre SpO2 (%) 93  -SD     O2 Delivery Pre Treatment nasal cannula  -SD     Intra SpO2 (%) 90  -SD     O2 Delivery Intra Treatment non-rebreather   -SD     Post SpO2 (%) 93  -SD     O2 Delivery Post Treatment nasal cannula  -SD     Pre Patient Position Supine  -SD     Intra Patient Position Standing  -SD     Post Patient Position Sitting  -SD       Row Name 07/21/23 0948          Positioning and Restraints    Pre-Treatment Position in bed  -SD     Post Treatment Position chair  -SD     In Chair notified nsg;reclined;call light within reach;encouraged to call for assist;waffle cushion  -SD               User Key  (r) = Recorded By, (t) = Taken By, (c) = Cosigned By      Initials Name Provider Type    Lyn Alvares, PT Physical Therapist                   Outcome Measures       Row Name 07/21/23 0951 07/21/23 0800       How much help from another person do you currently need...    Turning from your back to your side while in flat bed without using bedrails? 4  -SD 4  -EB    Moving from lying on back to sitting on the side of a flat bed without bedrails? 4  -SD 3  -EB    Moving to and from a bed to a chair (including a wheelchair)? 4  -SD 3  -EB    Standing up from a chair using your arms (e.g., wheelchair, bedside chair)? 4  -SD 3  -EB    Climbing 3-5 steps with a railing? 4  -SD 2  -EB    To walk in hospital room? 3  -SD 3  -EB    AM-PAC 6 Clicks Score (PT) 23  -SD 18  -EB    Highest level of mobility 7 --> Walked 25 feet or more  -SD 6 --> Walked 10 steps or more  -EB      Row Name 07/21/23 0951          Functional Assessment    Outcome Measure Options AM-PAC 6 Clicks Basic Mobility (PT)  -SD               User Key  (r) = Recorded By, (t) = Taken By, (c) = Cosigned By      Initials Name Provider Type    Lyn Alvares, PT Physical Therapist    Amanda Bravo RN Registered Nurse                                  Physical Therapy Education       Title: PT OT SLP Therapies (Done)       Topic: Physical Therapy (Done)       Point: Mobility training (Done)       Learning Progress Summary             Patient Eager, E, VU by SD at 7/21/2023 0952                         Point: Home exercise program (Done)       Learning Progress Summary             Patient Eager, E, VU by SD at 7/21/2023 0952                         Point: Body mechanics (Done)       Learning Progress Summary             Patient Eager, E, VU by SD at 7/21/2023 0952                         Point: Precautions (Done)       Learning Progress Summary             Patient Eager, E, VU by SD at 7/21/2023 0952                                         User Key       Initials Effective Dates Name Provider Type Discipline    SD 03/13/23 -  Lyn Howell, PT Physical Therapist PT                  PT Recommendation and Plan  Planned Therapy Interventions (PT): balance training, bed mobility training, gait training, home exercise program, patient/family education, transfer training, neuromuscular re-education, stair training, strengthening  Plan of Care Reviewed With: patient  Outcome Evaluation: Pt presents with weakness, decreased activity ted, and difficulty walking and is below baseline level of function for mobility. Pt amb 2 x 90ft without assistive device with CGA. Pt on NRB and able to maintain sats ~90%. Pt mildly unsteady, however no overt LOB. Seated rest needed in between. PT rec d/c home with outpatient pulmonary rehab.     Time Calculation:   PT Evaluation Complexity  History, PT Evaluation Complexity: 3 or more personal factors and/or comorbidities  Examination of Body Systems (PT Eval Complexity): total of 3 or more elements  Clinical Presentation (PT Evaluation Complexity): unstable  Clinical Decision Making (PT Evaluation Complexity): moderate complexity  Overall Complexity (PT Evaluation Complexity): moderate complexity     PT Charges        Row Name 07/21/23 0952             Time Calculation    Start Time 0839  -SD      PT Non-Billable Time (min) 55 min  -SD      PT Received On 07/21/23  -SD      PT Goal Re-Cert Due Date 07/31/23  -SD                User Key  (r) = Recorded By, (t) = Taken By, (c) = Cosigned By      Initials Name Provider Type    Lyn Alvares, PT Physical Therapist                  Therapy Charges for Today       Code Description Service Date Service Provider Modifiers Qty    71335526018 HC PT EVAL MOD COMPLEXITY 4 7/21/2023 Lyn Howell, PT GP 1            PT G-Codes  Outcome Measure Options: AM-PAC 6 Clicks Basic Mobility (PT)  AM-PAC 6 Clicks Score (PT): 23  PT Discharge Summary  Anticipated Discharge Disposition (PT): home with outpatient therapy services    Lyn Howell, ANIA  7/21/2023

## 2023-07-21 NOTE — PLAN OF CARE
Goal Outcome Evaluation:  Plan of Care Reviewed With: patient         VSS, 02 6L, SR. No c/o's noted today. Ambulated approx 180 ft with PT with some mild exertion and Shortness of air. Will continue current plan of care

## 2023-07-21 NOTE — PLAN OF CARE
Problem: Adult Inpatient Plan of Care  Goal: Plan of Care Review  Outcome: Ongoing, Progressing  Flowsheets (Taken 7/21/2023 1850)  Plan of Care Reviewed With: patient  Outcome Evaluation: VSS, continues to require 6L NC to maintain 02 90% or greater. Reported some moderate pain and nausea following eating dinner.  Goal: Patient-Specific Goal (Individualized)  Outcome: Ongoing, Progressing  Goal: Absence of Hospital-Acquired Illness or Injury  Outcome: Ongoing, Progressing  Intervention: Identify and Manage Fall Risk  Recent Flowsheet Documentation  Taken 7/21/2023 1800 by Gabi Head RN  Safety Promotion/Fall Prevention:   activity supervised   fall prevention program maintained   nonskid shoes/slippers when out of bed   safety round/check completed  Taken 7/21/2023 1600 by Gabi Head RN  Safety Promotion/Fall Prevention:   activity supervised   fall prevention program maintained   nonskid shoes/slippers when out of bed   safety round/check completed  Intervention: Prevent Skin Injury  Recent Flowsheet Documentation  Taken 7/21/2023 1800 by Gabi Head RN  Body Position: sitting up in bed  Taken 7/21/2023 1600 by Gabi Head RN  Body Position: sitting up in bed  Intervention: Prevent and Manage VTE (Venous Thromboembolism) Risk  Recent Flowsheet Documentation  Taken 7/21/2023 1800 by Gabi Head RN  Activity Management: activity encouraged  Taken 7/21/2023 1600 by Gabi Head RN  Activity Management: activity encouraged  Goal: Optimal Comfort and Wellbeing  Outcome: Ongoing, Progressing  Goal: Readiness for Transition of Care  Outcome: Ongoing, Progressing   Goal Outcome Evaluation:  Plan of Care Reviewed With: patient           Outcome Evaluation: VSS, continues to require 6L NC to maintain 02 90% or greater. Reported some moderate pain and nausea following eating dinner.

## 2023-07-21 NOTE — PROGRESS NOTES
[facility] Medicine Services  PROGRESS NOTE    Patient Name: Mary West  : 1960  MRN: [id number]    Date of Admission: 2023  Primary Care Physician: Provider, No Known    Subjective   Subjective     CC:  SOA    HPI:  Breathing better.  Has been sitting up in chair and working on IS.       ROS:  Gen- No fevers, chills  CV- No chest pain, palpitations  Resp- No cough, +dyspnea  GI- No N/V/D, abd pain      Objective   Objective     Vital Signs:   Temp:  [98 °F (36.7 °C)-98.5 °F (36.9 °C)] 98.5 °F (36.9 °C)  Heart Rate:  [64-82] 67  Resp:  [16-20] 18  BP: ()/(66-82) 112/67  Flow (L/min):  [5-6] 6     Physical Exam:  Constitutional: No acute distress, awake, alert  HENT: NCAT, mucous membranes moist  Respiratory: moving air a little better, respiratory effort normal on 6LNC  Cardiovascular: RRR, no murmurs, rubs, or gallops  Gastrointestinal: Positive bowel sounds, soft, nontender, nondistended  Musculoskeletal: No bilateral ankle edema  Psychiatric: Appropriate affect, cooperative  Neurologic: Oriented x 3, strength symmetric in all extremities, Cranial Nerves grossly intact to confrontation, speech clear  Skin: No rashes      Results Reviewed:  LAB RESULTS:      Lab 23  0757 23  0806 23  1023 23  0535 23  1708   WBC 15.45* 14.79* 7.00 4.54 7.20   HEMOGLOBIN 15.6 14.8 13.9 14.4 16.2*   HEMATOCRIT 48.1* 47.0* 44.5 47.0* 51.2*   PLATELETS 245 201 177 179 209   NEUTROS ABS  --  13.93* 5.35 3.62 4.51   IMMATURE GRANS (ABS)  --  0.08* 0.03 0.05 0.01   LYMPHS ABS  --  0.61* 1.08 0.73 2.08   MONOS ABS  --  0.15 0.41 0.11 0.45   EOS ABS  --  0.00 0.07 0.01 0.08   .3* 104.4* 104.0* 108.3* 104.7*   PROCALCITONIN  --   --   --   --  0.02   LACTATE  --   --   --   --  1.4         Lab 23  0757 23  0806 23  1023 23  0543 23  1708   SODIUM 137 138 143 141 145   POTASSIUM 5.5* 5.4* 4.4 4.6 4.1   CHLORIDE 99 102 109* 107  109*   CO2 28.0 28.0 24.0 24.0 28.0   ANION GAP 10.0 8.0 10.0 10.0 8.0   BUN 23 19 18 17 16   CREATININE 0.56* 0.66 0.50* 0.57 0.59   EGFR 103.3 99.3 106.2 102.9 102.0   GLUCOSE 174* 170* 106* 129* 58*   CALCIUM 9.5 9.0 8.7 8.7 9.0         Lab 07/17/23  1708   TOTAL PROTEIN 6.6   ALBUMIN 4.1   GLOBULIN 2.5   ALT (SGPT) 12   AST (SGOT) 23   BILIRUBIN 0.7   ALK PHOS 109         Lab 07/17/23  1708   PROBNP 6,751.0*   HSTROP T 18*         Lab 07/18/23  0543   CHOLESTEROL 108   LDL CHOL 58   HDL CHOL 37*   TRIGLYCERIDES 54             Lab 07/17/23  2048   PH, ARTERIAL 7.376   PCO2, ARTERIAL 44.8   PO2 ART 53.0*   FIO2 36   HCO3 ART 26.2*   BASE EXCESS ART 0.6   CARBOXYHEMOGLOBIN 4.4*     Brief Urine Lab Results       None            Microbiology Results Abnormal       Procedure Component Value - Date/Time    Blood Culture - Blood, Hand, Left [[id number]]  (Normal) Collected: 07/17/23 1726    Lab Status: Preliminary result Specimen: Blood from Hand, Left Updated: 07/20/23 1745     Blood Culture No growth at 3 days    Blood Culture - Blood, Arm, Right [[id number]]  (Normal) Collected: 07/17/23 1705    Lab Status: Preliminary result Specimen: Blood from Arm, Right Updated: 07/20/23 1731     Blood Culture No growth at 3 days    Legionella Antigen, Urine - Urine, Urine, Clean Catch [[id number]]  (Normal) Collected: 07/18/23 0116    Lab Status: Final result Specimen: Urine, Clean Catch Updated: 07/18/23 0933     LEGIONELLA ANTIGEN, URINE Negative    S. Pneumo Ag Urine or CSF - Urine, Urine, Clean Catch [[id number]]  (Normal) Collected: 07/18/23 0116    Lab Status: Final result Specimen: Urine, Clean Catch Updated: 07/18/23 0933     Strep Pneumo Ag Negative    MRSA Screen, PCR (Inpatient) - Swab, Nares [[id number]]  (Normal) Collected: 07/17/23 2351    Lab Status: Final result Specimen: Swab from Nares Updated: 07/18/23 0843     MRSA PCR Negative    Narrative:      The negative predictive value of this diagnostic test is high and  should only be used to consider de-escalating anti-MRSA therapy. A positive result may indicate colonization with MRSA and must be correlated clinically.  MRSA Negative    Respiratory Panel PCR w/COVID-19(SARS-CoV-2) JONAS/RINA/NAFISA/PAD/COR/MAD/SALOME In-House, NP Swab in UTM/VTM, 3-4 HR TAT - Swab, Nasopharynx [[id number]]  (Normal) Collected: 07/17/23 1785    Lab Status: Final result Specimen: Swab from Nasopharynx Updated: 07/18/23 0103     ADENOVIRUS, PCR Not Detected     Coronavirus 229E Not Detected     Coronavirus HKU1 Not Detected     Coronavirus NL63 Not Detected     Coronavirus OC43 Not Detected     COVID19 Not Detected     Human Metapneumovirus Not Detected     Human Rhinovirus/Enterovirus Not Detected     Influenza A PCR Not Detected     Influenza B PCR Not Detected     Parainfluenza Virus 1 Not Detected     Parainfluenza Virus 2 Not Detected     Parainfluenza Virus 3 Not Detected     Parainfluenza Virus 4 Not Detected     RSV, PCR Not Detected     Bordetella pertussis pcr Not Detected     Bordetella parapertussis PCR Not Detected     Chlamydophila pneumoniae PCR Not Detected     Mycoplasma pneumo by PCR Not Detected    Narrative:      In the setting of a positive respiratory panel with a viral infection PLUS a negative procalcitonin without other underlying concern for bacterial infection, consider observing off antibiotics or discontinuation of antibiotics and continue supportive care. If the respiratory panel is positive for atypical bacterial infection (Bordetella pertussis, Chlamydophila pneumoniae, or Mycoplasma pneumoniae), consider antibiotic de-escalation to target atypical bacterial infection.            No radiology results from the last 24 hrs    Results for orders placed during the hospital encounter of 01/30/22    Adult Transthoracic Echo Complete W/ Cont if Necessary Per Protocol    Interpretation Summary  · Estimated right ventricular systolic pressure from tricuspid regurgitation is normal (<35  mmHg). Calculated right ventricular systolic pressure from tricuspid regurgitation is 29 mmHg.  · Estimated left ventricular EF = 50% Left ventricular systolic function is normal.      Current medications:  Scheduled Meds:cefTRIAXone, 1,000 mg, Intravenous, Q24H  doxycycline, 100 mg, Intravenous, Q12H  guaiFENesin, 1,200 mg, Oral, Q12H  ipratropium-albuterol, 3 mL, Nebulization, Q4H - RT  methylPREDNISolone sodium succinate, 60 mg, Intravenous, Q8H  pancrelipase (Lip-Prot-Amyl), 36,000 units of lipase, Oral, TID With Meals  sodium chloride, 10 mL, Intravenous, Q12H      Continuous Infusions:     PRN Meds:.  acetaminophen    calcium carbonate    dextromethorphan polistirex ER    melatonin    ondansetron **OR** ondansetron    oxyCODONE-acetaminophen    sodium chloride    sodium chloride    sodium chloride    Assessment & Plan   Assessment & Plan     Active Hospital Problems    Diagnosis  POA    **COPD exacerbation [J44.1]  Yes    Severe malnutrition [E43]  Yes    Hypoglycemia [E16.2]  Yes    Acute exacerbation of chronic obstructive pulmonary disease (COPD) [J44.1]  Yes    CAP (community acquired pneumonia) [J18.9]  Yes    Tobacco dependence [F17.200]  Yes    Acute on chronic respiratory failure [J96.20]  Yes    COPD with acute exacerbation [J44.1]  Yes      Resolved Hospital Problems   No resolved problems to display.        Brief Hospital Course to date:  Mary West is a 62 y.o. female with a history of COPD (intermittent home O2 use), chronic pancreatitis, and ongoing tobacco use who presents to [facility] ED for complaint of worsening shortness of breath.     Acute Respiratory Failure w/ Hypoxia  COPD w/ acute exacerbation  Pneumonia  -CTA chest tonight negative for PE, but does show pneumonia of of the left lower lobe.  -Resp PCR negative.  MRSA PCR negative  -Sputum cultures pending  -strep pneumo, legionella ag's negative  -continue Rocephin + Doxycycline   -Duo Nebs q4  -Incentive Spirometer  - baseline  of 2LNC qhs only.  Weaned down from HFNC to 6LNC.  continue IV solumedrol day 3  -encouraged good pulmonary toilet.  encouraged IS/flutter valve, out of bed to chair     Tobacco Use  --encouraged patient that absolutely must quit smoking        Expected Discharge Location and Transportation:   Expected Discharge   Expected Discharge Date: 7/21/2023; Expected Discharge Time:      DVT prophylaxis:  Mechanical DVT prophylaxis orders are present.     AM-PAC 6 Clicks Score (PT): 23 (07/21/23 0704)    CODE STATUS:   Code Status and Medical Interventions:   Ordered at: 07/17/23 6200     Code Status (Patient has no pulse and is not breathing):    CPR (Attempt to Resuscitate)     Medical Interventions (Patient has pulse or is breathing):    Full Support       Sathya Okeefe MD  07/21/23

## 2023-07-22 LAB
BACTERIA SPEC AEROBE CULT: NORMAL
BACTERIA SPEC AEROBE CULT: NORMAL

## 2023-07-22 PROCEDURE — 94664 DEMO&/EVAL PT USE INHALER: CPT

## 2023-07-22 PROCEDURE — 25010000002 METHYLPREDNISOLONE PER 40 MG: Performed by: INTERNAL MEDICINE

## 2023-07-22 PROCEDURE — 25010000002 ONDANSETRON PER 1 MG: Performed by: PHYSICIAN ASSISTANT

## 2023-07-22 PROCEDURE — 94799 UNLISTED PULMONARY SVC/PX: CPT

## 2023-07-22 PROCEDURE — 99232 SBSQ HOSP IP/OBS MODERATE 35: CPT | Performed by: INTERNAL MEDICINE

## 2023-07-22 PROCEDURE — 63710000001 PREDNISONE PER 1 MG: Performed by: INTERNAL MEDICINE

## 2023-07-22 PROCEDURE — 25010000002 CEFTRIAXONE PER 250 MG: Performed by: PHYSICIAN ASSISTANT

## 2023-07-22 RX ORDER — PREDNISONE 20 MG/1
40 TABLET ORAL 2 TIMES DAILY WITH MEALS
Status: DISCONTINUED | OUTPATIENT
Start: 2023-07-22 | End: 2023-07-23 | Stop reason: HOSPADM

## 2023-07-22 RX ADMIN — SODIUM CHLORIDE 1000 MG: 900 INJECTION INTRAVENOUS at 20:33

## 2023-07-22 RX ADMIN — OXYCODONE HYDROCHLORIDE AND ACETAMINOPHEN 1 TABLET: 5; 325 TABLET ORAL at 21:47

## 2023-07-22 RX ADMIN — DOXYCYCLINE 100 MG: 100 INJECTION, POWDER, LYOPHILIZED, FOR SOLUTION INTRAVENOUS at 10:54

## 2023-07-22 RX ADMIN — PANCRELIPASE 36000 UNITS OF LIPASE: 60000; 12000; 38000 CAPSULE, DELAYED RELEASE PELLETS ORAL at 11:45

## 2023-07-22 RX ADMIN — IPRATROPIUM BROMIDE AND ALBUTEROL SULFATE 3 ML: .5; 2.5 SOLUTION RESPIRATORY (INHALATION) at 19:35

## 2023-07-22 RX ADMIN — IPRATROPIUM BROMIDE AND ALBUTEROL SULFATE 3 ML: .5; 2.5 SOLUTION RESPIRATORY (INHALATION) at 07:38

## 2023-07-22 RX ADMIN — GUAIFENESIN 1200 MG: 600 TABLET, EXTENDED RELEASE ORAL at 08:03

## 2023-07-22 RX ADMIN — PANCRELIPASE 36000 UNITS OF LIPASE: 60000; 12000; 38000 CAPSULE, DELAYED RELEASE PELLETS ORAL at 07:54

## 2023-07-22 RX ADMIN — METHYLPREDNISOLONE SODIUM SUCCINATE 60 MG: 40 INJECTION, POWDER, LYOPHILIZED, FOR SOLUTION INTRAMUSCULAR; INTRAVENOUS at 05:49

## 2023-07-22 RX ADMIN — IPRATROPIUM BROMIDE AND ALBUTEROL SULFATE 3 ML: .5; 2.5 SOLUTION RESPIRATORY (INHALATION) at 16:03

## 2023-07-22 RX ADMIN — IPRATROPIUM BROMIDE AND ALBUTEROL SULFATE 3 ML: .5; 2.5 SOLUTION RESPIRATORY (INHALATION) at 12:26

## 2023-07-22 RX ADMIN — ONDANSETRON 4 MG: 2 INJECTION INTRAMUSCULAR; INTRAVENOUS at 12:41

## 2023-07-22 RX ADMIN — ONDANSETRON 4 MG: 2 INJECTION INTRAMUSCULAR; INTRAVENOUS at 21:47

## 2023-07-22 RX ADMIN — GUAIFENESIN 1200 MG: 600 TABLET, EXTENDED RELEASE ORAL at 20:32

## 2023-07-22 RX ADMIN — PREDNISONE 40 MG: 20 TABLET ORAL at 17:12

## 2023-07-22 RX ADMIN — IPRATROPIUM BROMIDE AND ALBUTEROL SULFATE 3 ML: .5; 2.5 SOLUTION RESPIRATORY (INHALATION) at 02:55

## 2023-07-22 RX ADMIN — PANCRELIPASE 36000 UNITS OF LIPASE: 60000; 12000; 38000 CAPSULE, DELAYED RELEASE PELLETS ORAL at 17:12

## 2023-07-22 RX ADMIN — Medication 5 MG: at 21:47

## 2023-07-22 RX ADMIN — Medication 10 ML: at 20:33

## 2023-07-22 NOTE — PLAN OF CARE
Goal Outcome Evaluation:   VSS, 6L humidified NC, A&Ox4. IV antibiotics administered. PRNs given for pain. No further concerns at this time, will continue plan of care.

## 2023-07-22 NOTE — PLAN OF CARE
Goal Outcome Evaluation:              Outcome Evaluation: VSS.  On 4L O2 NC currently at end of shift with O2 sats at 90%.  No complaints of pain.  PRN Zofran given for nausea.  Tele d/c'd.  Steroid switched to po.  Doxycycline infused and completed.  Will continue to follow plan of care.

## 2023-07-22 NOTE — PROGRESS NOTES
"    [facility]  PROGRESS NOTE    Patient Name: Mary West  : 1960  MRN: [id number]    Date of Admission: 2023  Primary Care Physician: Provider, No Known    Subjective   Subjective     CC:  SOA    HPI \"110 percent better.\"  Comfortable on current 5L nc, has been up to BR without dyspnea.  Still works - would be interested in portable tank for work     Trying to quit and would accept patches at DC        ROS:  Gen- No fevers, chills  CV- No chest pain, palpitations  Resp - cough not proeductive  GI - eats a lot but has malabsorption      Objective   Objective     Vital Signs:   Temp:  [97.7 °F (36.5 °C)-98.3 °F (36.8 °C)] 98 °F (36.7 °C)  Heart Rate:  [65-95] 72  Resp:  [16-20] 16  BP: (109-140)/(64-81) 140/78  Flow (L/min):  [5-6] 5     Physical Exam:  Constitutional: No acute distress, awake, alert on nc, breathing easily , appears cachectic  HENT: NCAT, mucous membranes moist  Respiratory: very distant breath sounds, bit worse on L , no wheeze   Cardiovascular: RRR,   Gastrointestinal: Positive bowel sounds, soft, nontender, nondistended  Musculoskeletal: No bilateral ankle edema  Psychiatric: Appropriate affect, cooperative  Neurologic: Oriented x 3, strength symmetric in all extremities, Cranial Nerves grossly intact to confrontation, speech clear  Skin: No rashes      Results Reviewed:  LAB RESULTS:      Lab 23  0757 23  0806 23  1023 23  0535 23  1708   WBC 15.45* 14.79* 7.00 4.54 7.20   HEMOGLOBIN 15.6 14.8 13.9 14.4 16.2*   HEMATOCRIT 48.1* 47.0* 44.5 47.0* 51.2*   PLATELETS 245 201 177 179 209   NEUTROS ABS  --  13.93* 5.35 3.62 4.51   IMMATURE GRANS (ABS)  --  0.08* 0.03 0.05 0.01   LYMPHS ABS  --  0.61* 1.08 0.73 2.08   MONOS ABS  --  0.15 0.41 0.11 0.45   EOS ABS  --  0.00 0.07 0.01 0.08   .3* 104.4* 104.0* 108.3* 104.7*   PROCALCITONIN  --   --   --   --  0.02   LACTATE  --   --   --   --  1.4         Lab " 07/21/23  0757 07/20/23  0806 07/19/23  1023 07/18/23  0543 07/17/23  1708   SODIUM 137 138 143 141 145   POTASSIUM 5.5* 5.4* 4.4 4.6 4.1   CHLORIDE 99 102 109* 107 109*   CO2 28.0 28.0 24.0 24.0 28.0   ANION GAP 10.0 8.0 10.0 10.0 8.0   BUN 23 19 18 17 16   CREATININE 0.56* 0.66 0.50* 0.57 0.59   EGFR 103.3 99.3 106.2 102.9 102.0   GLUCOSE 174* 170* 106* 129* 58*   CALCIUM 9.5 9.0 8.7 8.7 9.0         Lab 07/17/23  1708   TOTAL PROTEIN 6.6   ALBUMIN 4.1   GLOBULIN 2.5   ALT (SGPT) 12   AST (SGOT) 23   BILIRUBIN 0.7   ALK PHOS 109         Lab 07/17/23  1708   PROBNP 6,751.0*   HSTROP T 18*         Lab 07/18/23  0543   CHOLESTEROL 108   LDL CHOL 58   HDL CHOL 37*   TRIGLYCERIDES 54             Lab 07/17/23  2048   PH, ARTERIAL 7.376   PCO2, ARTERIAL 44.8   PO2 ART 53.0*   FIO2 36   HCO3 ART 26.2*   BASE EXCESS ART 0.6   CARBOXYHEMOGLOBIN 4.4*     Brief Urine Lab Results       None            Microbiology Results Abnormal       Procedure Component Value - Date/Time    Respiratory Culture - Sputum, Cough [[id number]] Collected: 07/21/23 1550    Lab Status: Final result Specimen: Sputum from Cough Updated: 07/21/23 1821     Respiratory Culture Rejected     Gram Stain Rare (1+) WBCs per low power field      Many (4+) Epithelial cells per low power field      Moderate (3+) Yeast with hyphae seen      Moderate (3+) Gram variable bacilli      Rare (1+) Gram positive cocci    Narrative:      Specimen rejected due to oropharyngeal contamination. Please reorder and recollect specimen if clinically necessary.  Specimen rejected due to oropharyngeal contamination.    Blood Culture - Blood, Hand, Left [[id number]]  (Normal) Collected: 07/17/23 1726    Lab Status: Preliminary result Specimen: Blood from Hand, Left Updated: 07/21/23 1745     Blood Culture No growth at 4 days    Blood Culture - Blood, Arm, Right [[id number]]  (Normal) Collected: 07/17/23 4462    Lab Status: Preliminary result Specimen: Blood from Arm, Right Updated:  07/21/23 1731     Blood Culture No growth at 4 days    Legionella Antigen, Urine - Urine, Urine, Clean Catch [[id number]]  (Normal) Collected: 07/18/23 0116    Lab Status: Final result Specimen: Urine, Clean Catch Updated: 07/18/23 0933     LEGIONELLA ANTIGEN, URINE Negative    S. Pneumo Ag Urine or CSF - Urine, Urine, Clean Catch [[id number]]  (Normal) Collected: 07/18/23 0116    Lab Status: Final result Specimen: Urine, Clean Catch Updated: 07/18/23 0933     Strep Pneumo Ag Negative    MRSA Screen, PCR (Inpatient) - Swab, Nares [[id number]]  (Normal) Collected: 07/17/23 2351    Lab Status: Final result Specimen: Swab from Nares Updated: 07/18/23 0843     MRSA PCR Negative    Narrative:      The negative predictive value of this diagnostic test is high and should only be used to consider de-escalating anti-MRSA therapy. A positive result may indicate colonization with MRSA and must be correlated clinically.  MRSA Negative    Respiratory Panel PCR w/COVID-19(SARS-CoV-2) JONAS/RINA/NAFISA/PAD/COR/MAD/SALOME In-House, NP Swab in UTM/VTM, 3-4 HR TAT - Swab, Nasopharynx [[id number]]  (Normal) Collected: 07/17/23 2351    Lab Status: Final result Specimen: Swab from Nasopharynx Updated: 07/18/23 0103     ADENOVIRUS, PCR Not Detected     Coronavirus 229E Not Detected     Coronavirus HKU1 Not Detected     Coronavirus NL63 Not Detected     Coronavirus OC43 Not Detected     COVID19 Not Detected     Human Metapneumovirus Not Detected     Human Rhinovirus/Enterovirus Not Detected     Influenza A PCR Not Detected     Influenza B PCR Not Detected     Parainfluenza Virus 1 Not Detected     Parainfluenza Virus 2 Not Detected     Parainfluenza Virus 3 Not Detected     Parainfluenza Virus 4 Not Detected     RSV, PCR Not Detected     Bordetella pertussis pcr Not Detected     Bordetella parapertussis PCR Not Detected     Chlamydophila pneumoniae PCR Not Detected     Mycoplasma pneumo by PCR Not Detected    Narrative:      In the setting of a  positive respiratory panel with a viral infection PLUS a negative procalcitonin without other underlying concern for bacterial infection, consider observing off antibiotics or discontinuation of antibiotics and continue supportive care. If the respiratory panel is positive for atypical bacterial infection (Bordetella pertussis, Chlamydophila pneumoniae, or Mycoplasma pneumoniae), consider antibiotic de-escalation to target atypical bacterial infection.            No radiology results from the last 24 hrs    Results for orders placed during the hospital encounter of 01/30/22    Adult Transthoracic Echo Complete W/ Cont if Necessary Per Protocol    Interpretation Summary  · Estimated right ventricular systolic pressure from tricuspid regurgitation is normal (<35 mmHg). Calculated right ventricular systolic pressure from tricuspid regurgitation is 29 mmHg.  · Estimated left ventricular EF = 50% Left ventricular systolic function is normal.      Current medications:  Scheduled Meds:cefTRIAXone, 1,000 mg, Intravenous, Q24H  guaiFENesin, 1,200 mg, Oral, Q12H  ipratropium-albuterol, 3 mL, Nebulization, Q4H - RT  pancrelipase (Lip-Prot-Amyl), 36,000 units of lipase, Oral, TID With Meals  predniSONE, 40 mg, Oral, BID With Meals  sodium chloride, 10 mL, Intravenous, Q12H      Continuous Infusions:     PRN Meds:.  acetaminophen    calcium carbonate    dextromethorphan polistirex ER    melatonin    ondansetron **OR** ondansetron    oxyCODONE-acetaminophen    sodium chloride    sodium chloride    sodium chloride    Assessment & Plan   Assessment & Plan     Active Hospital Problems    Diagnosis  POA    **COPD exacerbation [J44.1]  Yes    Severe malnutrition [E43]  Yes    Hypoglycemia [E16.2]  Yes    Acute exacerbation of chronic obstructive pulmonary disease (COPD) [J44.1]  Yes    CAP (community acquired pneumonia) [J18.9]  Yes    Tobacco dependence [F17.200]  Yes    Acute on chronic respiratory failure [J96.20]  Yes    COPD with  acute exacerbation [J44.1]  Yes      Resolved Hospital Problems   No resolved problems to display.        Brief Hospital Course to date:  Mary West is a 62 y.o. female with a history of COPD (intermittent home O2 use), chronic pancreatitis, and ongoing tobacco use who presents to [facility] ED for complaint of worsening shortness of breath.     Acute Respiratory Failure w/ Hypoxia  COPD w/ acute exacerbation  Pneumonia LLL   - baseline ox is 2L qhs, currently on 6L   -CTA chest :  LLL pna  -Resp PCR negative.  MRSA PCR negative  -strep pneumo, legionella ag's negative  -continue Rocephin + Doxycycline   -Duo Nebs q4  -Incentive Spirometer  - Solumedrol wean to pred  IS/flutter valve, out of bed to chair    Chronic pancreatitis  Malabsorption  Malnutrition      Tobacco Use  -- counseled         Expected Discharge Location and Transportation:   Expected Discharge   Expected Discharge Date: 7/23/2023; Expected Discharge Time:      DVT prophylaxis:  Mechanical DVT prophylaxis orders are present.     AM-PAC 6 Clicks Score (PT): 23 (07/22/23 7945)    CODE STATUS:   Code Status and Medical Interventions:   Ordered at: 07/17/23 4755     Code Status (Patient has no pulse and is not breathing):    CPR (Attempt to Resuscitate)     Medical Interventions (Patient has pulse or is breathing):    Full Support       Kezia Perez MD  07/22/23

## 2023-07-23 VITALS
OXYGEN SATURATION: 95 % | HEIGHT: 67 IN | WEIGHT: 82 LBS | HEART RATE: 65 BPM | SYSTOLIC BLOOD PRESSURE: 138 MMHG | TEMPERATURE: 98 F | DIASTOLIC BLOOD PRESSURE: 76 MMHG | BODY MASS INDEX: 12.87 KG/M2 | RESPIRATION RATE: 18 BRPM

## 2023-07-23 PROCEDURE — 99239 HOSP IP/OBS DSCHRG MGMT >30: CPT | Performed by: INTERNAL MEDICINE

## 2023-07-23 PROCEDURE — 94799 UNLISTED PULMONARY SVC/PX: CPT

## 2023-07-23 PROCEDURE — 94664 DEMO&/EVAL PT USE INHALER: CPT

## 2023-07-23 PROCEDURE — 94761 N-INVAS EAR/PLS OXIMETRY MLT: CPT

## 2023-07-23 PROCEDURE — 63710000001 PREDNISONE PER 1 MG: Performed by: INTERNAL MEDICINE

## 2023-07-23 RX ORDER — CALCIUM CARBONATE 500 MG/1
2 TABLET, CHEWABLE ORAL 2 TIMES DAILY PRN
Start: 2023-07-23

## 2023-07-23 RX ORDER — IPRATROPIUM BROMIDE AND ALBUTEROL SULFATE 2.5; .5 MG/3ML; MG/3ML
3 SOLUTION RESPIRATORY (INHALATION)
Qty: 360 ML | Refills: 5 | Status: SHIPPED | OUTPATIENT
Start: 2023-07-23

## 2023-07-23 RX ORDER — FLUTICASONE FUROATE, UMECLIDINIUM BROMIDE AND VILANTEROL TRIFENATATE 100; 62.5; 25 UG/1; UG/1; UG/1
1 POWDER RESPIRATORY (INHALATION)
Qty: 1 EACH | Refills: 11 | Status: SHIPPED | OUTPATIENT
Start: 2023-07-23

## 2023-07-23 RX ORDER — ALBUTEROL SULFATE 90 UG/1
2 AEROSOL, METERED RESPIRATORY (INHALATION) EVERY 4 HOURS PRN
Qty: 6.7 G | Refills: 11 | Status: SHIPPED | OUTPATIENT
Start: 2023-07-23

## 2023-07-23 RX ORDER — PREDNISONE 20 MG/1
40 TABLET ORAL DAILY
Qty: 5 TABLET | Refills: 0 | Status: SHIPPED | OUTPATIENT
Start: 2023-07-23

## 2023-07-23 RX ORDER — MULTIPLE VITAMINS W/ MINERALS TAB 9MG-400MCG
1 TAB ORAL DAILY
Qty: 90 EACH | Refills: 3
Start: 2023-07-23

## 2023-07-23 RX ADMIN — IPRATROPIUM BROMIDE AND ALBUTEROL SULFATE 3 ML: .5; 2.5 SOLUTION RESPIRATORY (INHALATION) at 03:55

## 2023-07-23 RX ADMIN — PREDNISONE 40 MG: 20 TABLET ORAL at 08:38

## 2023-07-23 RX ADMIN — GUAIFENESIN 1200 MG: 600 TABLET, EXTENDED RELEASE ORAL at 08:38

## 2023-07-23 RX ADMIN — PANCRELIPASE 36000 UNITS OF LIPASE: 60000; 12000; 38000 CAPSULE, DELAYED RELEASE PELLETS ORAL at 08:38

## 2023-07-23 RX ADMIN — Medication 10 ML: at 08:38

## 2023-07-23 RX ADMIN — PANCRELIPASE 36000 UNITS OF LIPASE: 60000; 12000; 38000 CAPSULE, DELAYED RELEASE PELLETS ORAL at 12:07

## 2023-07-23 RX ADMIN — IPRATROPIUM BROMIDE AND ALBUTEROL SULFATE 3 ML: .5; 2.5 SOLUTION RESPIRATORY (INHALATION) at 08:30

## 2023-07-23 NOTE — CASE MANAGEMENT/SOCIAL WORK
Continued Stay Note  [facility]     Patient Name: Mary West  MRN: [id number]  Today's Date: 7/23/2023    Admit Date: 7/17/2023    Plan: Home   Discharge Plan       Row Name 07/23/23 0958       Plan    Plan Comments Spoke with pt about about hx of home O2 and she reports she had it but cannot remember with who or why it was taken away. Victor Hugo was able to track down a previous admit that she was set up with home O2 with [employer]. Victor Hugo spoke with the weekend oncall worker who confirms she had been set up with home O2 but she would not do the needed follow up to continue with it so they had to take the equipment back. They have agreed to accept her back as a client and provide her with a portable tank, home set up, and a neb machine with the understanding she would follow up with a PCP as soon as possible. If not they would once again have to take their equipment back. VICTOR HUGO spoke with pt and explained the importance of pt following up with a PCP and that she would be responsible for this. Pt verbalised understanding. Due to the weekend VICTOR HUGO is unable to arrange a new PCP for pt today but Monday morning the weekday VICTOR HUGO will arrange the next available appt with a PCP for pt and will contact pt both by her phone and her email @ [email or web address]. Pt is in agreement with plan. VICTOR HUGO will arrange a lyft for pt to return home today if needed.    Final Discharge Disposition Code 01 - home or self-care                   Discharge Codes    No documentation.                 Expected Discharge Date and Time       Expected Discharge Date Expected Discharge Time    Jul 23, 2023               Savannah Albert RN

## 2023-07-23 NOTE — DISCHARGE PLACEMENT REQUEST
"Keo West (62 y.o. Female)       Pt will need a portable tank to her room today for discharge. She is in room S217 @[facility]            Date of Birth   1960    Social Security Number       Address   [address] [place] [id number]    Home Phone   [phone]    MRN   [id number]       Worship   [employer]    Marital Status                               Admission Date   7/17/23    Admission Type   Emergency    Admitting Provider   Kezia Perez MD    Attending Provider   Kezia Perez MD    Department, Room/Bed   [facility] 2F, S217/1       Discharge Date       Discharge Disposition   Home or Self Care    Discharge Destination                                 Attending Provider: Kezia Perez MD    Allergies: Green Pepper, Mushroom, Methadone    Isolation: None   Infection: None   Code Status: CPR    Ht: 170.2 cm (67\")   Wt: 37.2 kg (82 lb)    Admission Cmt: None   Principal Problem: COPD exacerbation [J44.1]                   Active Insurance as of 7/17/2023       Primary Coverage       Payor Plan Insurance Group Employer/Plan Group    [facility] MEDICARE REPLACEMENT [facility] MEDICARE REPLACEMENT [id number] DUAL       Payor Plan Address Payor Plan Phone Number Payor Plan Fax Number Effective Dates    [address] [address] [phone]  1/20/2019 - None Entered    [facility] [id number]         Subscriber Name Subscriber Birth Date Member ID       KEO WEST 1960 [id number]                     Emergency Contacts        (Rel.) Home Phone Work Phone Mobile Phone    Pearl Owens (Daughter) -- -- [phone]    Anna Watson (Friend) -- -- [phone]             [address]  [address] [facility] [address]  Dept. Phone:  [phone]  Dept. Fax:  [phone] Date Ordered: Jul 23, 2023         Patient:  Keo West MRN:  [id number]   [address]  [facility] [address] " ":  1960  SSN:    Phone: [phone] Sex:  F     Weight: 37.2 kg (82 lb)         Ht Readings from Last 1 Encounters:   23 170.2 cm (67\")         Oxygen Therapy         (Order ID: [id number])    Diagnosis:  COPD with acute exacerbation (J44.1 [ICD-10-CM] 491.21 [ICD-9-CM])   Quantity:  1     Delivery Modality: Nasal Cannula  Liters Per Minute: 4  Duration: Continuous  Equipment:  Oxygen Concentrator &  &  Portable Gaseous Oxygen System & Portable Oxygen Contents Gaseous &  Conserving Regulator  The face to face evaluation was performed on: 2023  Which [employer] company is this being sent to? [facility] [[id number]]  Length of Need (99 Months = Lifetime): 99 Months = Lifetime        Authorizing Provider's Phone: [phone]  Verbal Order Mode: Verbal with readback   Authorizing Provider: Kezia Perez MD  Authorizing Provider's NPI: [id number]     Order Entered By: Savannah Albert RN 2023  9:41 AM     Electronically signed by:        [address]  [address] [place] [address]  Dept. Phone:  [phone]  Dept. Fax:  [phone] Date Ordered: 2023         Patient:  Mary West MRN:  [id number]   [address] [facility] [address] [place], [place] [address] :  1960  SSN:    Phone: [phone] Sex:  F     Weight: 37.2 kg (82 lb)         Ht Readings from Last 1 Encounters:   23 170.2 cm (67\")         Home Nebulizer          (Order ID: [id number])    Diagnosis:  COPD with acute exacerbation (J44.1 [ICD-10-CM] 491.21 [ICD-9-CM])  Pneumonia of left lower lobe due to infectious organism (J18.9 [ICD-10-CM] 486 [ICD-9-CM])  Impaired functional mobility, balance, gait, and endurance (Z74.09 [ICD-10-CM] V49.89 [ICD-9-CM])  Acute on chronic respiratory failure with hypoxia (J96.21 [ICD-10-CM] 518.84,799.02 [ICD-9-CM])  Acute respiratory failure due to COVID-19 (U07.1,J96.00 [ICD-10-CM] 518.81,079.89 [ICD-9-CM])  Acute " exacerbation of chronic obstructive pulmonary disease (COPD) (J44.1 [ICD-10-CM] 491.21 [ICD-9-CM])  COPD exacerbation (J44.1 [ICD-10-CM] 491.21 [ICD-9-CM])   Quantity:  1     Nebulizer Equipment:  Nebulizer w/ Compressor  Nebulizer Accessories:  Nebulizer Kit - Administration Set, Non-Disposable  The face to face evaluation was performed on: 2023  Length of Need (99 Months = Lifetime): 99 Months = Lifetime        Authorizing Provider's Phone: [phone]  Verbal Order Mode: Verbal with readback   Authorizing Provider: Kezia Perez MD  Authorizing Provider's NPI: [id number]     Order Entered By: Savannah Albert RN 2023  9:41 AM     Electronically signed by:         Insurance Information                  [facility] MEDICARE REPLACEMENT/WELL[employer] MEDICARE REPLACEMENT Phone: [phone]    Subscriber: Mary West Subscriber#: [id number]    Group#: [id number] DUAL Precert#: --             History & Physical        Wilfrido Oconnor III, DO at 23              [facility] Medicine Services  HISTORY AND PHYSICAL    Patient Name: Mary West  : 1960  MRN: [id number]  Primary Care Physician: Provider, No Known  Date of admission: 2023    Subjective  Subjective     Chief Complaint:  Shortness of breath    HPI:  Mary West is a 62 y.o. female with a history of COPD (intermittent home O2 use), chronic pancreatitis, and ongoing tobacco use who presents to [facility] ED for complaint of worsening shortness of breath. She states this has been going on for about 2 weeks now but has worsened in the last few days. She reports that she wears supplemental O2 intermittently at night, but has had to use it much more often over the last 2 weeks. She states that her O2 tank ran out about 3 days ago, and has been trying to use her albuterol inhaler to combat her symptoms. She does also note somewhat worsening of her chronic cough as well. She  denies fever, chills, chest pain, abdominal pain, nausea, vomiting or diarrhea. She does continue to smoke half pack a day.         Review of Systems   Constitutional:  Positive for fatigue. Negative for chills, fever and unexpected weight change.   HENT:  Negative for postnasal drip, sore throat and trouble swallowing.    Eyes:  Negative for photophobia and visual disturbance.   Respiratory:  Positive for cough and shortness of breath. Negative for wheezing.    Cardiovascular:  Negative for chest pain and palpitations.   Gastrointestinal:  Negative for abdominal pain, diarrhea, nausea and vomiting.   Genitourinary:  Negative for dysuria and hematuria.   Musculoskeletal:  Negative for arthralgias and myalgias.   Skin: Negative.    Neurological:  Positive for weakness (Generalized). Negative for tremors, syncope, speech difficulty and headaches.   Psychiatric/Behavioral:  Negative for confusion. The patient is not nervous/anxious.         Personal History     Past Medical History:   Diagnosis Date    Alcohol abuse     Quit early 2000's    COPD (chronic obstructive pulmonary disease)     Pancreatitis              No past surgical history on file.    Family History:  family history includes Heart failure in her mother; Stroke in her father and mother.     Social History:  reports that she has been smoking. She has a 20.00 pack-year smoking history. She has never used smokeless tobacco. Drug use questions deferred to the physician. She reports that she does not drink alcohol.  Social History     Social History Narrative    Not on file       Medications:  Pancrelipase (Lip-Prot-Amyl), acetaminophen, albuterol sulfate HFA, calcium carbonate, dextromethorphan polistirex ER, famotidine, guaiFENesin, multivitamin with minerals, ondansetron ODT, oxyCODONE-acetaminophen, and tiotropium bromide monohydrate    Allergies   Allergen Reactions    Methadone Swelling       Objective  Objective     Vital Signs:   Temp:  [97.8 °F (36.6  °C)] 97.8 °F (36.6 °C)  Heart Rate:  [63-86] 69  Resp:  [20-24] 20  BP: (111-150)/(68-90) 111/85  Flow (L/min):  [3-4] 3    Physical Exam  Constitutional:       General: She is not in acute distress.     Appearance: Normal appearance.      Comments: Frail malnourished appearing female in no acute distress.    HENT:      Head: Atraumatic.      Right Ear: External ear normal.      Left Ear: External ear normal.      Nose: Nose normal.   Eyes:      Extraocular Movements: Extraocular movements intact.      Conjunctiva/sclera: Conjunctivae normal.      Pupils: Pupils are equal, round, and reactive to light.   Cardiovascular:      Rate and Rhythm: Normal rate and regular rhythm.      Pulses: Normal pulses.      Heart sounds: Normal heart sounds. No murmur heard.  Pulmonary:      Effort: Pulmonary effort is normal. No respiratory distress.      Breath sounds: Rales present. No wheezing or rhonchi.      Comments: On 5L NC  Abdominal:      General: Bowel sounds are normal. There is no distension.      Tenderness: There is no abdominal tenderness. There is no guarding or rebound.   Musculoskeletal:         General: Normal range of motion.      Cervical back: No rigidity.      Right lower leg: No edema.      Left lower leg: No edema.   Skin:     General: Skin is warm and dry.      Coloration: Skin is not jaundiced.      Findings: No lesion or rash.   Neurological:      General: No focal deficit present.      Mental Status: She is alert and oriented to person, place, and time.   Psychiatric:         Attention and Perception: Attention normal.         Mood and Affect: Mood normal.         Behavior: Behavior normal.         Thought Content: Thought content normal.          Result Review:  I have personally reviewed the results from the time of this admission to 7/17/2023 20:41 EDT and agree with these findings:  [x]  Laboratory list / accordion  [x]  Microbiology  [x]  Radiology  [x]  EKG/Telemetry   []  Cardiology/Vascular   []   Pathology  []  Old records  []  Other:      LAB RESULTS:      Lab 07/17/23  1708   WBC 7.20   HEMOGLOBIN 16.2*   HEMATOCRIT 51.2*   PLATELETS 209   NEUTROS ABS 4.51   IMMATURE GRANS (ABS) 0.01   LYMPHS ABS 2.08   MONOS ABS 0.45   EOS ABS 0.08   .7*   LACTATE 1.4         Lab 07/17/23  1708   SODIUM 145   POTASSIUM 4.1   CHLORIDE 109*   CO2 28.0   ANION GAP 8.0   BUN 16   CREATININE 0.59   EGFR 102.0   GLUCOSE 58*   CALCIUM 9.0         Lab 07/17/23  1708   TOTAL PROTEIN 6.6   ALBUMIN 4.1   GLOBULIN 2.5   ALT (SGPT) 12   AST (SGOT) 23   BILIRUBIN 0.7   ALK PHOS 109         Lab 07/17/23  1708   PROBNP 6,751.0*   HSTROP T 18*                 Brief Urine Lab Results       None          Microbiology Results (last 10 days)       ** No results found for the last 240 hours. **            XR Chest 1 View    Result Date: 7/17/2023  XR CHEST 1 VW Date of Exam: 7/17/2023 4:22 PM EDT Indication: SOA triage protocol Comparison: 2/5/2023. Findings: Mildly increased right-sided pleural effusion noted. Trace effusion persist on the left. Advanced chronic emphysematous changes are stable, without pneumothorax or new focal consolidation. Unchanged heart and mediastinal contours.     Impression: Impression: Advanced emphysema and mildly enlarged layering right-sided pleural effusion. Electronically Signed: Broderick Reynoso  7/17/2023 4:46 PM EDT  Workstation ID: [id number]    CT Angiogram Chest    Result Date: 7/17/2023  CT ANGIOGRAM CHEST Date of Exam: 7/17/2023 7:05 PM EDT Indication: hypoxia, r/o PE. Comparison: Chest radiograph from earlier today and CT chest from January 30, 2022 Technique: CTA of the chest was performed after the uneventful intravenous administration of 85 mL Isovue-370. Reconstructed coronal and sagittal images were also obtained. In addition, a 3-D volume rendered image was created for interpretation. Automated exposure control and iterative reconstruction methods were used. Findings: The central  tracheobronchial tree is clear. There is advanced emphysema. There are small bilateral pleural effusions. There is a small consolidation in the superior segment of the left lower lobe. The heart is enlarged. The great vessels are normal in caliber. There is no evidence of pulmonary embolus. No abnormally enlarged lymph nodes are identified. Partial evaluation of the upper abdomen demonstrates pneumobilia, likely secondary to prior sphincterotomy. No aggressive osseous lesions are identified. There is a stable mild chronic compression deformity in the lower thoracic spine.     Impression: Impression: 1.Negative for pulmonary embolus. 2.Small consolidation within left lower lobe, likely a mild infectious or inflammatory process. 3.Advanced emphysema. 4.Cardiomegaly. Electronically Signed: Alton Coffman  7/17/2023 7:35 PM EDT  Workstation ID: [id number]     Results for orders placed during the hospital encounter of 01/30/22    Adult Transthoracic Echo Complete W/ Cont if Necessary Per Protocol    Interpretation Summary  · Estimated right ventricular systolic pressure from tricuspid regurgitation is normal (<35 mmHg). Calculated right ventricular systolic pressure from tricuspid regurgitation is 29 mmHg.  · Estimated left ventricular EF = 50% Left ventricular systolic function is normal.      Assessment & Plan  Assessment & Plan       COPD with acute exacerbation    Acute on chronic respiratory failure    CAP (community acquired pneumonia)    Tobacco dependence    Hypoglycemia      Mary West is a 62 y.o. female with a history of COPD (intermittent home O2 use), chronic pancreatitis, and ongoing tobacco use who presents to [facility] ED for complaint of worsening shortness of breath.    Acute Respiratory Failure w/ Hypoxia  COPD w/ acute exacerbation  Pneumonia  -Currently on 5L NC. Her baseline is 2L only at night.   -CTA chest tonight negative for PE, but does show pneumonia of of the left lower lobe.  -Resp PCR  "pending  -Sputum cultures pending  -PNA urine cultures pending  -Received Ceftriaxone and Azithromycin in the ED. Will change to Rocephin + Doxycycline  -Duo Nebs q4  -Incentive Spirometer    Tobacco Use  -Mary West  reports that she has been smoking. She has a 20.00 pack-year smoking history. She has never used smokeless tobacco.. I have educated her on the risk of diseases from using tobacco products such as cancer, COPD, and heart disease.     I advised her to quit and she is not willing to quit.    I spent 6 minutes counseling the patient.             DVT prophylaxis:  SCDS    CODE STATUS:  Full Code       Expected DischargeTBD     This note has been completed as part of a split-shared workflow.     Signature: Electronically signed by Sharifa Harris PA-C, 07/17/23, 9:28 PM EDT      Total APC time spent: 65 mintues        Attending   Admission Attestation       I have performed an independent face-to-face diagnostic evaluation including performing an independent physical examination as documented here.  The documented plan of care above was reviewed and developed with the advanced practice clinician (APC).      Brief Summary Statement:   Mary West is a 62 y.o. female who states that she has had worsening shortness of breath over the last 2 weeks.  She has noticed increased frequency of cough as well.  She has known COPD and uses oxygen at home when at rest and at night.  She states that 3 days ago she ran out of O2 and her symptoms \"got a lot worse really quickly.\"  She states that because \"we are understaffed where I work\" she tried to use her albuterol inhaler and frequent short rest periods while continuing to work, but this was not effective.  She confirms that earlier today (Monday) she had a syncopal episode at work after coughing; she states her O2 saturation was not checked at that time; she denies any fever or chills, abdominal pain, chest pain, or slurred speech/facial " "droop.    Remainder of detailed HPI is as noted by APC and has been reviewed and/or edited by me for completeness.    Attending Physical Exam:  Temp:  [97.8 °F (36.6 °C)] 97.8 °F (36.6 °C)  Heart Rate:  [63-86] 81  Resp:  [20-24] 20  BP: (111-150)/(68-90) 113/81  Flow (L/min):  [3-4] 3    Constitutional: Awake, alert, NAD, pleasant.  Thin, chronically ill-appearing.  Eyes: PERRLA, sclerae anicteric, no conjunctival injection  HENT: NCAT, mucous membranes moist  Neck: Supple, no thyromegaly, no lymphadenopathy, trachea midline  Respiratory: Decreased/\"distant\" breath sounds to auscultation bilaterally, mild bilateral rales, nonlabored respirations.  During my visit she is on 5 L by nasal cannula and is satting 91-92%.  Cardiovascular: RRR, no murmurs, rubs, or gallops, palpable pedal pulses bilaterally  Gastrointestinal: Positive bowel sounds, soft, nontender, nondistended  Musculoskeletal: No bilateral ankle edema, no clubbing or cyanosis to extremities  Psychiatric: Appropriate affect, cooperative  Neurologic: Oriented x 3, strength symmetric in all extremities, Cranial Nerves grossly intact to confrontation, speech clear  Skin: No rashes, normal turgor.    Brief Assessment/Plan :  See detailed assessment and plan developed with APC which I have reviewed and/or edited for completeness.    Total time spent: 27 minutes  Time spent includes time reviewing chart, face-to-face time, counseling patient/family/caregiver, ordering medications/tests/procedures, communicating with other health care professionals, documenting clinical information in the electronic health record, and coordination of care.         Wilfrido Oconnor III, DO  07/17/23               Electronically signed by Wilfrido Oconnor III, DO at 07/17/23 2140       Oxygen Therapy (last day)       Date/Time SpO2 Device (Oxygen Therapy) Flow (L/min) Oxygen Concentration (%) ETCO2 (mmHg)    07/23/23 0830 90 humidified;nasal cannula 4 -- --    " 07/23/23 0800 87 -- -- -- --    07/23/23 0713 91 humidified;nasal cannula 4 -- --    07/23/23 0355 95 humidified;nasal cannula 4 -- --    07/23/23 0320 95 -- -- -- --    07/22/23 2300 94 -- -- -- --    07/22/23 2000 -- -- 4 -- --    07/22/23 1935 91 humidified;nasal cannula 4 -- --    07/22/23 1910 91 -- -- -- --    07/22/23 1800 -- humidified;nasal cannula 4 -- --    07/22/23 1603 94 humidified;nasal cannula 4 -- --    07/22/23 1600 -- humidified;nasal cannula 4 -- --    07/22/23 1550 95 high-flow nasal cannula 4 -- --    07/22/23 1435 -- nasal cannula 5 -- --    07/22/23 1345 94 -- -- -- --    07/22/23 1226 94 nasal cannula 5 -- --    07/22/23 1200 -- nasal cannula 5.5 -- --    07/22/23 1132 96 nasal cannula 5 -- --    07/22/23 1000 -- nasal cannula 5.5 -- --    07/22/23 0755 -- nasal cannula 5.5 -- --    07/22/23 0750 92 nasal cannula 5.5 -- --    07/22/23 0738 95 nasal cannula 6 -- --    07/22/23 0600 94 humidified;nasal cannula 6 50 --    07/22/23 0400 93 humidified;nasal cannula 6 50 --    07/22/23 0305 92 humidified;nasal cannula 6 -- --    07/22/23 0255 -- humidified;nasal cannula 6 -- --    07/22/23 0200 94 humidified;nasal cannula 6 50 --    07/22/23 0000 92 humidified;nasal cannula 6 50 --          Ventilator/Non-Invasive Ventilation Settings (From admission, onward)      None

## 2023-07-23 NOTE — PLAN OF CARE
Goal Outcome Evaluation:              Outcome Evaluation: VSS, 3LNC. Home oxygen tank delivered to bedside, instructed to call company once she gets home for setup. DC home via lyft

## 2023-07-23 NOTE — PLAN OF CARE
Goal Outcome Evaluation:              Outcome Evaluation: VSS, O2@4L NC, states she has rested, voices no needs or concerns, NAD noted.

## 2023-07-24 LAB
QT INTERVAL: 394 MS
QTC INTERVAL: 434 MS

## 2023-07-24 NOTE — DISCHARGE SUMMARY
[facility] Medicine Services  DISCHARGE SUMMARY    Patient Name: Mary West  : 1960  MRN: [id number]    Date of Admission: 2023  4:39 PM  Date of Discharge:  2023  Primary Care Physician: Provider, No Known    Consults       No orders found from 2023 to 2023.            Hospital Course     Presenting Problem: dyspnea     Active Hospital Problems    Diagnosis  POA    **COPD exacerbation [J44.1]  Yes    Severe malnutrition [E43]  Yes    Hypoglycemia [E16.2]  Yes    Acute exacerbation of chronic obstructive pulmonary disease (COPD) [J44.1]  Yes    CAP (community acquired pneumonia) [J18.9]  Yes    Tobacco dependence [F17.200]  Yes    Acute on chronic respiratory failure [J96.20]  Yes    COPD with acute exacerbation [J44.1]  Yes      Resolved Hospital Problems   No resolved problems to display.          Hospital Course:  Mary West is a 62 y.o. female with a history of COPD (intermittent home O2 use), chronic pancreatitis, and ongoing tobacco use who presents to [facility] ED for complaint of worsening shortness of breath for 2 weeks, continues to smoke, and states her home oxygen tank rank dry.      Acute Respiratory Failure w/ Hypoxia  COPD w/ acute exacerbation  Pneumonia LLL   - baseline ox is 2L qhs.  She initially required up to 6L, but improved to baseline.   -CTA chest :  LLL pna.  She was treated with CTX and doxycycline.  MRSA pc screen was negative  -steroids and nebs given      Chronic pancreatitis  Malabsorption  Malnutrition      Tobacco Use -- counseled      Discharge Follow Up Recommendations for outpatient labs/diagnostics:   * FU with PCP per routine     Day of Discharge     HPI:  feels much better and would like to go home    Review of Systems  No fever.    Minimal GRISSOM    Vital Signs:          Physical Exam:  Gen:  WD/WN  Neuro: alert and oriented, clear speech, follows commands, grossly nonfocal  HEENT:  NC/AT   Neck:  Supple, no  LAD  Heart RRR no murmur  Lungs CTA nonlabored no wheeze   Abd:  Soft, nontender, no rebound or guarding, pos BS  Extrem:  No c/c/e      Pertinent  and/or Most Recent Results     LAB RESULTS:      Lab 07/21/23  0757 07/20/23  0806 07/19/23  1023 07/18/23  0535 07/17/23  1708   WBC 15.45* 14.79* 7.00 4.54 7.20   HEMOGLOBIN 15.6 14.8 13.9 14.4 16.2*   HEMATOCRIT 48.1* 47.0* 44.5 47.0* 51.2*   PLATELETS 245 201 177 179 209   NEUTROS ABS  --  13.93* 5.35 3.62 4.51   IMMATURE GRANS (ABS)  --  0.08* 0.03 0.05 0.01   LYMPHS ABS  --  0.61* 1.08 0.73 2.08   MONOS ABS  --  0.15 0.41 0.11 0.45   EOS ABS  --  0.00 0.07 0.01 0.08   .3* 104.4* 104.0* 108.3* 104.7*   PROCALCITONIN  --   --   --   --  0.02   LACTATE  --   --   --   --  1.4         Lab 07/21/23  0757 07/20/23  0806 07/19/23  1023 07/18/23  0543 07/17/23  1708   SODIUM 137 138 143 141 145   POTASSIUM 5.5* 5.4* 4.4 4.6 4.1   CHLORIDE 99 102 109* 107 109*   CO2 28.0 28.0 24.0 24.0 28.0   ANION GAP 10.0 8.0 10.0 10.0 8.0   BUN 23 19 18 17 16   CREATININE 0.56* 0.66 0.50* 0.57 0.59   EGFR 103.3 99.3 106.2 102.9 102.0   GLUCOSE 174* 170* 106* 129* 58*   CALCIUM 9.5 9.0 8.7 8.7 9.0         Lab 07/17/23  1708   TOTAL PROTEIN 6.6   ALBUMIN 4.1   GLOBULIN 2.5   ALT (SGPT) 12   AST (SGOT) 23   BILIRUBIN 0.7   ALK PHOS 109         Lab 07/17/23  1708   PROBNP 6,751.0*   HSTROP T 18*         Lab 07/18/23  0543   CHOLESTEROL 108   LDL CHOL 58   HDL CHOL 37*   TRIGLYCERIDES 54             Lab 07/17/23 2048   PH, ARTERIAL 7.376   PCO2, ARTERIAL 44.8   PO2 ART 53.0*   FIO2 36   HCO3 ART 26.2*   BASE EXCESS ART 0.6   CARBOXYHEMOGLOBIN 4.4*     Brief Urine Lab Results       None          Microbiology Results (last 10 days)       Procedure Component Value - Date/Time    Respiratory Culture - Sputum, Cough [[id number]] Collected: 07/21/23 1550    Lab Status: Final result Specimen: Sputum from Cough Updated: 07/21/23 1821     Respiratory Culture Rejected     Gram Stain Rare (1+)  WBCs per low power field      Many (4+) Epithelial cells per low power field      Moderate (3+) Yeast with hyphae seen      Moderate (3+) Gram variable bacilli      Rare (1+) Gram positive cocci    Narrative:      Specimen rejected due to oropharyngeal contamination. Please reorder and recollect specimen if clinically necessary.  Specimen rejected due to oropharyngeal contamination.    S. Pneumo Ag Urine or CSF - Urine, Urine, Clean Catch [[id number]]  (Normal) Collected: 07/18/23 0116    Lab Status: Final result Specimen: Urine, Clean Catch Updated: 07/18/23 0933     Strep Pneumo Ag Negative    Legionella Antigen, Urine - Urine, Urine, Clean Catch [[id number]]  (Normal) Collected: 07/18/23 0116    Lab Status: Final result Specimen: Urine, Clean Catch Updated: 07/18/23 0933     LEGIONELLA ANTIGEN, URINE Negative    Respiratory Panel PCR w/COVID-19(SARS-CoV-2) JONAS/RINA/NAFISA/PAD/COR/MAD/SALOME In-House, NP Swab in UTM/VTM, 3-4 HR TAT - Swab, Nasopharynx [[id number]]  (Normal) Collected: 07/17/23 2351    Lab Status: Final result Specimen: Swab from Nasopharynx Updated: 07/18/23 0103     ADENOVIRUS, PCR Not Detected     Coronavirus 229E Not Detected     Coronavirus HKU1 Not Detected     Coronavirus NL63 Not Detected     Coronavirus OC43 Not Detected     COVID19 Not Detected     Human Metapneumovirus Not Detected     Human Rhinovirus/Enterovirus Not Detected     Influenza A PCR Not Detected     Influenza B PCR Not Detected     Parainfluenza Virus 1 Not Detected     Parainfluenza Virus 2 Not Detected     Parainfluenza Virus 3 Not Detected     Parainfluenza Virus 4 Not Detected     RSV, PCR Not Detected     Bordetella pertussis pcr Not Detected     Bordetella parapertussis PCR Not Detected     Chlamydophila pneumoniae PCR Not Detected     Mycoplasma pneumo by PCR Not Detected    Narrative:      In the setting of a positive respiratory panel with a viral infection PLUS a negative procalcitonin without other underlying concern for  bacterial infection, consider observing off antibiotics or discontinuation of antibiotics and continue supportive care. If the respiratory panel is positive for atypical bacterial infection (Bordetella pertussis, Chlamydophila pneumoniae, or Mycoplasma pneumoniae), consider antibiotic de-escalation to target atypical bacterial infection.    MRSA Screen, PCR (Inpatient) - Swab, Nares [[id number]]  (Normal) Collected: 07/17/23 2351    Lab Status: Final result Specimen: Swab from Nares Updated: 07/18/23 0843     MRSA PCR Negative    Narrative:      The negative predictive value of this diagnostic test is high and should only be used to consider de-escalating anti-MRSA therapy. A positive result may indicate colonization with MRSA and must be correlated clinically.  MRSA Negative    Blood Culture - Blood, Hand, Left [[id number]]  (Normal) Collected: 07/17/23 1726    Lab Status: Final result Specimen: Blood from Hand, Left Updated: 07/22/23 1745     Blood Culture No growth at 5 days    Blood Culture - Blood, Arm, Right [[id number]]  (Normal) Collected: 07/17/23 1705    Lab Status: Final result Specimen: Blood from Arm, Right Updated: 07/22/23 1731     Blood Culture No growth at 5 days            XR Chest 1 View    Result Date: 7/17/2023  XR CHEST 1 VW Date of Exam: 7/17/2023 4:22 PM EDT Indication: SOA triage protocol Comparison: 2/5/2023. Findings: Mildly increased right-sided pleural effusion noted. Trace effusion persist on the left. Advanced chronic emphysematous changes are stable, without pneumothorax or new focal consolidation. Unchanged heart and mediastinal contours.     Impression: Advanced emphysema and mildly enlarged layering right-sided pleural effusion. Electronically Signed: Broderick Reynoso  7/17/2023 4:46 PM EDT  Workstation ID: [id number]    CT Angiogram Chest    Result Date: 7/17/2023  CT ANGIOGRAM CHEST Date of Exam: 7/17/2023 7:05 PM EDT Indication: hypoxia, r/o PE. Comparison: Chest radiograph from earlier  today and CT chest from January 30, 2022 Technique: CTA of the chest was performed after the uneventful intravenous administration of 85 mL Isovue-370. Reconstructed coronal and sagittal images were also obtained. In addition, a 3-D volume rendered image was created for interpretation. Automated exposure control and iterative reconstruction methods were used. Findings: The central tracheobronchial tree is clear. There is advanced emphysema. There are small bilateral pleural effusions. There is a small consolidation in the superior segment of the left lower lobe. The heart is enlarged. The great vessels are normal in caliber. There is no evidence of pulmonary embolus. No abnormally enlarged lymph nodes are identified. Partial evaluation of the upper abdomen demonstrates pneumobilia, likely secondary to prior sphincterotomy. No aggressive osseous lesions are identified. There is a stable mild chronic compression deformity in the lower thoracic spine.     Impression: 1.Negative for pulmonary embolus. 2.Small consolidation within left lower lobe, likely a mild infectious or inflammatory process. 3.Advanced emphysema. 4.Cardiomegaly. Electronically Signed: Alton Coffman  7/17/2023 7:35 PM EDT  Workstation ID: [id number]             Results for orders placed during the hospital encounter of 01/30/22    Adult Transthoracic Echo Complete W/ Cont if Necessary Per Protocol    Interpretation Summary  · Estimated right ventricular systolic pressure from tricuspid regurgitation is normal (<35 mmHg). Calculated right ventricular systolic pressure from tricuspid regurgitation is 29 mmHg.  · Estimated left ventricular EF = 50% Left ventricular systolic function is normal.      Discharge Details        Discharge Medications        New Medications        Instructions Start Date   albuterol sulfate  (90 Base) MCG/ACT inhaler  Commonly known as: PROVENTIL HFA;VENTOLIN HFA;PROAIR HFA   2 puffs, Inhalation, Every 4 Hours PRN       ipratropium-albuterol 0.5-2.5 mg/3 ml nebulizer  Commonly known as: DUO-NEB   3 mL, Nebulization, Every 4 Hours - RT      predniSONE 20 MG tablet  Commonly known as: DELTASONE   40 mg, Oral, Daily      Trelegy Ellipta 100-62.5-25 MCG/ACT inhaler  Generic drug: Fluticasone-Umeclidin-Vilant   1 puff, Inhalation, Daily - RT             Changes to Medications        Instructions Start Date   multivitamin with minerals tablet tablet  What changed: additional instructions   1 tablet, Oral, Daily             Continue These Medications        Instructions Start Date   calcium carbonate 500 MG chewable tablet  Commonly known as: TUMS   2 tablets, Oral, 2 Times Daily PRN, OTC      Pancrelipase (Lip-Prot-Amyl) 74584-847982 units capsule delayed-release particles capsule  Commonly known as: CREON   36,000 units of lipase, Oral, 3 Times Daily With Meals             Stop These Medications      oxyCODONE-acetaminophen  MG per tablet  Commonly known as: PERCOCET              Allergies   Allergen Reactions    Green Pepper Swelling     Throat and face    Mushroom Swelling     Throat and face    Methadone Swelling         Discharge Disposition:  Home or Self Care    Diet:  Hospital:No active diet order      Activity:      CODE STATUS:    Code Status and Medical Interventions:   Ordered at: 07/17/23 2130     Code Status (Patient has no pulse and is not breathing):    CPR (Attempt to Resuscitate)     Medical Interventions (Patient has pulse or is breathing):    Full Support       Additional Instructions for the Follow-ups that You Need to Schedule       Discharge Follow-up with PCP   As directed       Currently Documented PCP:    Provider, No Known    PCP Phone Number:    None     Follow Up Details: 1-2 weeks                       Kezia Perez MD  07/24/23      Time Spent on Discharge:  I spent  36  minutes on this discharge activity which included: face-to-face encounter with the patient, reviewing the data in the system,  coordination of the care with the nursing staff as well as consultants, documentation, and entering orders.

## 2023-07-24 NOTE — PROGRESS NOTES
Enter Query Response Below      Query Response: bacterial pna unspecified              If applicable, please update the problem list.     Patient: Mary West        : 1960  Account: [id number]           Admit Date: 2023        How to Respond to this query:       a. Click New Note     b. Answer query within the yellow box.                c. Update the Problem List, if applicable.      If you have any questions about this query contact me at: [phone]    Dr. Perez:     62-year-old female admitted with acute resp failure, chronic obstructive pulmonary disease exacerbation, and pneumonia.  The respiratory culture collected on 23 with finding of moderate 93+) gram variable bacilli, and rate (1+) gram positive cocci.  Medications during her hospital stay included Azithromycin 23,   IV Rocephin 23-23, and IV doxycycline 23-23.      Please clarify the type of pneumonia the patient was treated/monitored for:    Gram negative pneumonia (excluding Haemophilus influenzae)  Bacterial pneumonia unspecified  Other- specify______  Unable to determine    By submitting this query, we are merely seeking further clarification of documentation to accurately reflect all conditions that you are monitoring, evaluating, treating or that extend the hospitalization or utilize additional resources of care. Please utilize your independent clinical judgment when addressing the question(s) above.     This query and your response, once completed, will be entered into the legal medical record.    Sincerely,  Concetta Tse RN,BSN  [email or web address]  Clinical Documentation Integrity Program

## 2023-07-25 NOTE — PAYOR COMM NOTE
"  Keo West (62 y.o. Female)       Date of Birth   1960    Social Security Number       Address   [address] [place] [address]    Home Phone   [phone]    MRN   [id number]       Jainism   Yarsanism    Marital Status                               Admission Date   7/17/23    Admission Type   Emergency    Admitting Provider   Kezia Perez MD    Attending Provider       Department, Room/Bed   [address], [address]/1       Discharge Date   7/23/2023    Discharge Disposition   Home or Self Care    Discharge Destination                                 Attending Provider: (none)   Allergies: Green Pepper, Mushroom, Methadone    Isolation: None   Infection: None   Code Status: Prior    Ht: 170.2 cm (67\")   Wt: 37.2 kg (82 lb)    Admission Cmt: None   Principal Problem: COPD exacerbation [J44.1]                   Active Insurance as of 7/17/2023       Primary Coverage       Payor Plan Insurance Group Employer/Plan Group    [facility] [id number]       Payor Plan Address Payor Plan Phone Number Payor Plan Fax Number Effective Dates    [address] [address] [phone]  1/20/2019 - None Entered    [facility] [id number]         Subscriber Name Subscriber Birth Date Member ID       KEO WEST 1960 [id number]                     Emergency Contacts        (Rel.) Home Phone Work Phone Mobile Phone    Pearl Owens (Daughter) -- -- [phone]    SwathiAnna mark (Friend) -- -- [phone]              Discharge Summary    No notes of this type exist for this encounter.       "

## 2023-07-26 ENCOUNTER — READMISSION MANAGEMENT (OUTPATIENT)
Dept: CALL CENTER | Facility: HOSPITAL | Age: 63
End: 2023-07-26
Payer: MEDICARE

## 2023-07-26 NOTE — OUTREACH NOTE
COPD/PN Week 1 Survey      Flowsheet Row Responses   [facility] facility patient discharged from? [place]   Does the patient have one of the following disease processes/diagnoses(primary or secondary)? COPD   Week 1 attempt successful? No   Unsuccessful attempts Attempt 1            Shirley TAYLOR - Registered Nurse

## 2023-07-26 NOTE — OUTREACH NOTE
COPD/PN Week 1 Survey      Flowsheet Row Responses   [facility] patient discharged from? [facility]   Does the patient have one of the following disease processes/diagnoses(primary or secondary)? COPD   Week 1 attempt successful? Yes   Call start time 1409   Unsuccessful attempts Attempt 1   Call end time 1418   Discharge diagnosis A/C respiratory failure, COPD acute exacerbation, CAP (community acquired pneumonia)   Meds reviewed with patient/caregiver? Yes   Is the patient having any side effects they believe may be caused by any medication additions or changes? No   Does the patient have all medications ordered at discharge? No   What is keeping the patient from filling the prescriptions? --  [plans to  meds today]   Nursing Interventions No intervention needed   Is the patient taking all medications as directed (includes completed medication regime)? N/A   Does the patient have a primary care provider?  No   Does the patient have an appointment with their PCP or specialist within 7 days of discharge? No   Comments regarding PCP working on getting new PCP   What is preventing the patient from scheduling follow up appointments within 7 days of discharge? Haven't had time, Waiting on return call   Nursing Interventions Advised patient to make appointment   Has the patient kept scheduled appointments due by today? N/A   Has home health visited the patient within 72 hours of discharge? N/A   Psychosocial issues? No   Comments pt has O2 setup and can keep it as long as pt keeps up with PCP appts, which is a requirement by O2 company   Did the patient receive a copy of their discharge instructions? Yes   Nursing interventions Reviewed instructions with patient   What is the patient's perception of their health status since discharge? Improving   Nursing Interventions Nurse provided patient education   If the patient is a current smoker, are they able to teach back resources for cessation? --  [has decreased  to 2 cigs/day and occasional vaping]   Is the patient/caregiver able to teach back the hierarchy of who to call/visit for symptoms/problems? PCP, Specialist, Home health nurse, Urgent Care, ED, 911 Yes   Is the patient able to teach back COPD zones? Yes   Patient reports what zone on this call? Green Zone   Green Zone Reports doing well, Usual activity and exercise level, Usual amounts of cough and phlegm/mucous, Slept well last night, Breathing without shortness of breath, Appetite is good   Green Zone interventions: Take daily medications, Use oxygen as prescribed, Continue regular exercise/diet plan, At all times avoid cigarette smoking, vaping and inhaled irritants   Week 1 call completed? Yes   Call end time 1418            Shirley TAYLOR - Registered Nurse

## 2023-08-04 ENCOUNTER — READMISSION MANAGEMENT (OUTPATIENT)
Dept: CALL CENTER | Facility: HOSPITAL | Age: 63
End: 2023-08-04
Payer: MEDICARE

## 2023-08-08 ENCOUNTER — READMISSION MANAGEMENT (OUTPATIENT)
Dept: CALL CENTER | Facility: HOSPITAL | Age: 63
End: 2023-08-08
Payer: MEDICARE

## 2023-08-08 NOTE — OUTREACH NOTE
COPD/PN Week 2 Survey      Flowsheet Row Responses   [facility] facility patient discharged from? [place]   Does the patient have one of the following disease processes/diagnoses(primary or secondary)? COPD   Week 2 attempt successful? No   Unsuccessful attempts Attempt 2            Tracie ALMEIDA - Registered Nurse

## 2023-09-16 ENCOUNTER — APPOINTMENT (OUTPATIENT)
Dept: CT IMAGING | Facility: HOSPITAL | Age: 63
DRG: 190 | End: 2023-09-16
Payer: MEDICARE

## 2023-09-16 ENCOUNTER — APPOINTMENT (OUTPATIENT)
Dept: GENERAL RADIOLOGY | Facility: HOSPITAL | Age: 63
DRG: 190 | End: 2023-09-16
Payer: MEDICARE

## 2023-09-16 ENCOUNTER — HOSPITAL ENCOUNTER (INPATIENT)
Facility: HOSPITAL | Age: 63
LOS: 3 days | Discharge: HOME | DRG: 190 | End: 2023-09-20
Attending: EMERGENCY MEDICINE | Admitting: INTERNAL MEDICINE
Payer: MEDICARE

## 2023-09-16 DIAGNOSIS — J96.21 ACUTE ON CHRONIC RESPIRATORY FAILURE WITH HYPOXIA AND HYPERCAPNIA: ICD-10-CM

## 2023-09-16 DIAGNOSIS — J96.21 ACUTE ON CHRONIC RESPIRATORY FAILURE WITH HYPOXIA: Primary | ICD-10-CM

## 2023-09-16 DIAGNOSIS — J44.1 ACUTE EXACERBATION OF CHRONIC OBSTRUCTIVE PULMONARY DISEASE (COPD): ICD-10-CM

## 2023-09-16 DIAGNOSIS — E46 MALNUTRITION, UNSPECIFIED TYPE: ICD-10-CM

## 2023-09-16 DIAGNOSIS — F17.200 TOBACCO DEPENDENCE: ICD-10-CM

## 2023-09-16 DIAGNOSIS — R09.02 HYPOXIA: ICD-10-CM

## 2023-09-16 DIAGNOSIS — J90 BILATERAL PLEURAL EFFUSION: ICD-10-CM

## 2023-09-16 DIAGNOSIS — J96.22 ACUTE ON CHRONIC RESPIRATORY FAILURE WITH HYPOXIA AND HYPERCAPNIA: ICD-10-CM

## 2023-09-16 DIAGNOSIS — J44.1 COPD EXACERBATION: ICD-10-CM

## 2023-09-16 LAB
ALBUMIN SERPL-MCNC: 4.1 G/DL (ref 3.5–5.2)
ALBUMIN/GLOB SERPL: 1.9 G/DL
ALP SERPL-CCNC: 82 U/L (ref 39–117)
ALT SERPL W P-5'-P-CCNC: 10 U/L (ref 1–33)
ANION GAP SERPL CALCULATED.3IONS-SCNC: 10 MMOL/L (ref 5–15)
ARTERIAL PATENCY WRIST A: ABNORMAL
AST SERPL-CCNC: 22 U/L (ref 1–32)
ATMOSPHERIC PRESS: ABNORMAL MM[HG]
B PARAPERT DNA SPEC QL NAA+PROBE: NOT DETECTED
B PERT DNA SPEC QL NAA+PROBE: NOT DETECTED
BASE EXCESS BLDA CALC-SCNC: 5.5 MMOL/L (ref 0–2)
BASOPHILS # BLD AUTO: 0.05 10*3/MM3 (ref 0–0.2)
BASOPHILS NFR BLD AUTO: 0.6 % (ref 0–1.5)
BDY SITE: ABNORMAL
BILIRUB SERPL-MCNC: 0.6 MG/DL (ref 0–1.2)
BODY TEMPERATURE: 37 C
BUN SERPL-MCNC: 13 MG/DL (ref 8–23)
BUN/CREAT SERPL: 23.6 (ref 7–25)
C PNEUM DNA NPH QL NAA+NON-PROBE: NOT DETECTED
CALCIUM SPEC-SCNC: 9.2 MG/DL (ref 8.6–10.5)
CHLORIDE SERPL-SCNC: 106 MMOL/L (ref 98–107)
CO2 BLDA-SCNC: 31.9 MMOL/L (ref 22–33)
CO2 SERPL-SCNC: 30 MMOL/L (ref 22–29)
COHGB MFR BLD: 3.9 % (ref 0–2)
CREAT SERPL-MCNC: 0.55 MG/DL (ref 0.57–1)
CRP SERPL-MCNC: <0.3 MG/DL (ref 0–0.5)
D DIMER PPP FEU-MCNC: 1.11 MCGFEU/ML (ref 0–0.63)
D-LACTATE SERPL-SCNC: 2 MMOL/L (ref 0.5–2)
DEPRECATED RDW RBC AUTO: 53.5 FL (ref 37–54)
EGFRCR SERPLBLD CKD-EPI 2021: 103.1 ML/MIN/1.73
EOSINOPHIL # BLD AUTO: 0.1 10*3/MM3 (ref 0–0.4)
EOSINOPHIL NFR BLD AUTO: 1.2 % (ref 0.3–6.2)
EPAP: 0
ERYTHROCYTE [DISTWIDTH] IN BLOOD BY AUTOMATED COUNT: 13.8 % (ref 12.3–15.4)
FLUAV SUBTYP SPEC NAA+PROBE: NOT DETECTED
FLUBV RNA ISLT QL NAA+PROBE: NOT DETECTED
GLOBULIN UR ELPH-MCNC: 2.2 GM/DL
GLUCOSE SERPL-MCNC: 93 MG/DL (ref 65–99)
HADV DNA SPEC NAA+PROBE: NOT DETECTED
HCO3 BLDA-SCNC: 30.6 MMOL/L (ref 20–26)
HCOV 229E RNA SPEC QL NAA+PROBE: NOT DETECTED
HCOV HKU1 RNA SPEC QL NAA+PROBE: NOT DETECTED
HCOV NL63 RNA SPEC QL NAA+PROBE: NOT DETECTED
HCOV OC43 RNA SPEC QL NAA+PROBE: NOT DETECTED
HCT VFR BLD AUTO: 42.6 % (ref 34–46.6)
HCT VFR BLD CALC: 40 % (ref 38–51)
HGB BLD-MCNC: 13.6 G/DL (ref 12–15.9)
HGB BLDA-MCNC: 13.1 G/DL (ref 14–18)
HMPV RNA NPH QL NAA+NON-PROBE: NOT DETECTED
HPIV1 RNA ISLT QL NAA+PROBE: NOT DETECTED
HPIV2 RNA SPEC QL NAA+PROBE: NOT DETECTED
HPIV3 RNA NPH QL NAA+PROBE: NOT DETECTED
HPIV4 P GENE NPH QL NAA+PROBE: NOT DETECTED
IMM GRANULOCYTES # BLD AUTO: 0.03 10*3/MM3 (ref 0–0.05)
IMM GRANULOCYTES NFR BLD AUTO: 0.4 % (ref 0–0.5)
INHALED O2 CONCENTRATION: 44 %
IPAP: 0
LDH SERPL-CCNC: 244 U/L (ref 135–214)
LYMPHOCYTES # BLD AUTO: 2.1 10*3/MM3 (ref 0.7–3.1)
LYMPHOCYTES NFR BLD AUTO: 25.4 % (ref 19.6–45.3)
M PNEUMO IGG SER IA-ACNC: NOT DETECTED
MCH RBC QN AUTO: 33.1 PG (ref 26.6–33)
MCHC RBC AUTO-ENTMCNC: 31.9 G/DL (ref 31.5–35.7)
MCV RBC AUTO: 103.6 FL (ref 79–97)
METHGB BLD QL: 0.1 % (ref 0–1.5)
MODALITY: ABNORMAL
MONOCYTES # BLD AUTO: 0.51 10*3/MM3 (ref 0.1–0.9)
MONOCYTES NFR BLD AUTO: 6.2 % (ref 5–12)
NEUTROPHILS NFR BLD AUTO: 5.48 10*3/MM3 (ref 1.7–7)
NEUTROPHILS NFR BLD AUTO: 66.2 % (ref 42.7–76)
NRBC BLD AUTO-RTO: 0 /100 WBC (ref 0–0.2)
NT-PROBNP SERPL-MCNC: 7481 PG/ML (ref 0–900)
OXYHGB MFR BLDV: 90.9 % (ref 94–99)
PAW @ PEAK INSP FLOW SETTING VENT: 0 CMH2O
PCO2 BLDA: 45.5 MM HG (ref 35–45)
PCO2 TEMP ADJ BLD: 45.5 MM HG (ref 35–45)
PH BLDA: 7.43 PH UNITS (ref 7.35–7.45)
PH, TEMP CORRECTED: 7.43 PH UNITS
PLATELET # BLD AUTO: 232 10*3/MM3 (ref 140–450)
PMV BLD AUTO: 10.3 FL (ref 6–12)
PO2 BLDA: 71.4 MM HG (ref 83–108)
PO2 TEMP ADJ BLD: 71.4 MM HG (ref 83–108)
POTASSIUM SERPL-SCNC: 3.3 MMOL/L (ref 3.5–5.2)
PROCALCITONIN SERPL-MCNC: 0.04 NG/ML (ref 0–0.25)
PROT SERPL-MCNC: 6.3 G/DL (ref 6–8.5)
RBC # BLD AUTO: 4.11 10*6/MM3 (ref 3.77–5.28)
RHINOVIRUS RNA SPEC NAA+PROBE: NOT DETECTED
RSV RNA NPH QL NAA+NON-PROBE: NOT DETECTED
SARS-COV-2 RNA NPH QL NAA+NON-PROBE: NOT DETECTED
SODIUM SERPL-SCNC: 146 MMOL/L (ref 136–145)
TOTAL RATE: 0 BREATHS/MINUTE
TROPONIN T SERPL HS-MCNC: 24 NG/L
WBC NRBC COR # BLD: 8.27 10*3/MM3 (ref 3.4–10.8)

## 2023-09-16 PROCEDURE — 80053 COMPREHEN METABOLIC PANEL: CPT | Performed by: PHYSICIAN ASSISTANT

## 2023-09-16 PROCEDURE — 99285 EMERGENCY DEPT VISIT HI MDM: CPT

## 2023-09-16 PROCEDURE — 85379 FIBRIN DEGRADATION QUANT: CPT | Performed by: PHYSICIAN ASSISTANT

## 2023-09-16 PROCEDURE — 0202U NFCT DS 22 TRGT SARS-COV-2: CPT | Performed by: PHYSICIAN ASSISTANT

## 2023-09-16 PROCEDURE — 84484 ASSAY OF TROPONIN QUANT: CPT | Performed by: PHYSICIAN ASSISTANT

## 2023-09-16 PROCEDURE — 36600 WITHDRAWAL OF ARTERIAL BLOOD: CPT

## 2023-09-16 PROCEDURE — 83050 HGB METHEMOGLOBIN QUAN: CPT

## 2023-09-16 PROCEDURE — 94799 UNLISTED PULMONARY SVC/PX: CPT

## 2023-09-16 PROCEDURE — 25010000002 METHYLPREDNISOLONE PER 40 MG: Performed by: PHYSICIAN ASSISTANT

## 2023-09-16 PROCEDURE — 71045 X-RAY EXAM CHEST 1 VIEW: CPT

## 2023-09-16 PROCEDURE — 83880 ASSAY OF NATRIURETIC PEPTIDE: CPT | Performed by: PHYSICIAN ASSISTANT

## 2023-09-16 PROCEDURE — 82375 ASSAY CARBOXYHB QUANT: CPT

## 2023-09-16 PROCEDURE — 94640 AIRWAY INHALATION TREATMENT: CPT

## 2023-09-16 PROCEDURE — 83615 LACTATE (LD) (LDH) ENZYME: CPT | Performed by: PHYSICIAN ASSISTANT

## 2023-09-16 PROCEDURE — 93010 ELECTROCARDIOGRAM REPORT: CPT | Performed by: INTERNAL MEDICINE

## 2023-09-16 PROCEDURE — 85025 COMPLETE CBC W/AUTO DIFF WBC: CPT | Performed by: PHYSICIAN ASSISTANT

## 2023-09-16 PROCEDURE — 71275 CT ANGIOGRAPHY CHEST: CPT

## 2023-09-16 PROCEDURE — 84145 PROCALCITONIN (PCT): CPT | Performed by: PHYSICIAN ASSISTANT

## 2023-09-16 PROCEDURE — 82805 BLOOD GASES W/O2 SATURATION: CPT

## 2023-09-16 PROCEDURE — 25510000001 IOPAMIDOL PER 1 ML: Performed by: EMERGENCY MEDICINE

## 2023-09-16 PROCEDURE — 93005 ELECTROCARDIOGRAM TRACING: CPT | Performed by: PHYSICIAN ASSISTANT

## 2023-09-16 PROCEDURE — 83605 ASSAY OF LACTIC ACID: CPT | Performed by: PHYSICIAN ASSISTANT

## 2023-09-16 PROCEDURE — 86140 C-REACTIVE PROTEIN: CPT | Performed by: PHYSICIAN ASSISTANT

## 2023-09-16 RX ORDER — METHYLPREDNISOLONE SODIUM SUCCINATE 40 MG/ML
80 INJECTION, POWDER, LYOPHILIZED, FOR SOLUTION INTRAMUSCULAR; INTRAVENOUS ONCE
Status: COMPLETED | OUTPATIENT
Start: 2023-09-16 | End: 2023-09-16

## 2023-09-16 RX ORDER — IPRATROPIUM BROMIDE AND ALBUTEROL SULFATE 2.5; .5 MG/3ML; MG/3ML
3 SOLUTION RESPIRATORY (INHALATION) ONCE
Status: COMPLETED | OUTPATIENT
Start: 2023-09-16 | End: 2023-09-16

## 2023-09-16 RX ORDER — SODIUM CHLORIDE 0.9 % (FLUSH) 0.9 %
10 SYRINGE (ML) INJECTION AS NEEDED
Status: DISCONTINUED | OUTPATIENT
Start: 2023-09-16 | End: 2023-09-20 | Stop reason: HOSPADM

## 2023-09-16 RX ADMIN — IOPAMIDOL 75 ML: 755 INJECTION, SOLUTION INTRAVENOUS at 23:33

## 2023-09-16 RX ADMIN — METHYLPREDNISOLONE SODIUM SUCCINATE 80 MG: 40 INJECTION INTRAMUSCULAR; INTRAVENOUS at 21:53

## 2023-09-16 RX ADMIN — IPRATROPIUM BROMIDE AND ALBUTEROL SULFATE 3 ML: 2.5; .5 SOLUTION RESPIRATORY (INHALATION) at 20:41

## 2023-09-17 ENCOUNTER — APPOINTMENT (OUTPATIENT)
Dept: CARDIOLOGY | Facility: HOSPITAL | Age: 63
DRG: 190 | End: 2023-09-17
Payer: MEDICARE

## 2023-09-17 ENCOUNTER — APPOINTMENT (OUTPATIENT)
Dept: CT IMAGING | Facility: HOSPITAL | Age: 63
DRG: 190 | End: 2023-09-17
Payer: MEDICARE

## 2023-09-17 PROBLEM — R55 SYNCOPE: Status: ACTIVE | Noted: 2023-09-17

## 2023-09-17 PROBLEM — R77.8 ELEVATED TROPONIN: Status: ACTIVE | Noted: 2023-09-17

## 2023-09-17 PROBLEM — J44.9 COPD (CHRONIC OBSTRUCTIVE PULMONARY DISEASE): Status: ACTIVE | Noted: 2023-09-17

## 2023-09-17 PROBLEM — E87.6 HYPOKALEMIA: Status: ACTIVE | Noted: 2023-09-17

## 2023-09-17 PROBLEM — R79.89 ELEVATED TROPONIN: Status: ACTIVE | Noted: 2023-09-17

## 2023-09-17 LAB
ANION GAP SERPL CALCULATED.3IONS-SCNC: 10 MMOL/L (ref 5–15)
BASOPHILS # BLD AUTO: 0.01 10*3/MM3 (ref 0–0.2)
BASOPHILS NFR BLD AUTO: 0.2 % (ref 0–1.5)
BH CV ECHO MEAS - AI P1/2T: 640.2 MSEC
BH CV ECHO MEAS - AO MAX PG: 6.8 MMHG
BH CV ECHO MEAS - AO MEAN PG: 4 MMHG
BH CV ECHO MEAS - AO ROOT DIAM: 2.7 CM
BH CV ECHO MEAS - AO V2 MAX: 130 CM/SEC
BH CV ECHO MEAS - AO V2 VTI: 26.6 CM
BH CV ECHO MEAS - AVA(I,D): 2.06 CM2
BH CV ECHO MEAS - EDV(CUBED): 64 ML
BH CV ECHO MEAS - EDV(MOD-SP2): 60.5 ML
BH CV ECHO MEAS - EDV(MOD-SP4): 87.8 ML
BH CV ECHO MEAS - EF(MOD-BP): 54.4 %
BH CV ECHO MEAS - EF(MOD-SP2): 49.4 %
BH CV ECHO MEAS - EF(MOD-SP4): 59.9 %
BH CV ECHO MEAS - ESV(CUBED): 24.4 ML
BH CV ECHO MEAS - ESV(MOD-SP2): 30.6 ML
BH CV ECHO MEAS - ESV(MOD-SP4): 35.2 ML
BH CV ECHO MEAS - FS: 27.5 %
BH CV ECHO MEAS - IVS/LVPW: 1 CM
BH CV ECHO MEAS - IVSD: 0.7 CM
BH CV ECHO MEAS - LA DIMENSION: 3.7 CM
BH CV ECHO MEAS - LAT PEAK E' VEL: 5.1 CM/SEC
BH CV ECHO MEAS - LV MASS(C)D: 78.4 GRAMS
BH CV ECHO MEAS - LV MAX PG: 2.7 MMHG
BH CV ECHO MEAS - LV MEAN PG: 1 MMHG
BH CV ECHO MEAS - LV V1 MAX: 81.7 CM/SEC
BH CV ECHO MEAS - LV V1 VTI: 15.8 CM
BH CV ECHO MEAS - LVIDD: 4 CM
BH CV ECHO MEAS - LVIDS: 2.9 CM
BH CV ECHO MEAS - LVOT AREA: 3.5 CM2
BH CV ECHO MEAS - LVOT DIAM: 2.1 CM
BH CV ECHO MEAS - LVPWD: 0.7 CM
BH CV ECHO MEAS - MED PEAK E' VEL: 4.6 CM/SEC
BH CV ECHO MEAS - MV A MAX VEL: 80 CM/SEC
BH CV ECHO MEAS - MV DEC SLOPE: 219 CM/SEC2
BH CV ECHO MEAS - MV DEC TIME: 0.18 MSEC
BH CV ECHO MEAS - MV E MAX VEL: 42.3 CM/SEC
BH CV ECHO MEAS - MV E/A: 0.53
BH CV ECHO MEAS - MV MAX PG: 2.7 MMHG
BH CV ECHO MEAS - MV MEAN PG: 1 MMHG
BH CV ECHO MEAS - MV P1/2T: 60.3 MSEC
BH CV ECHO MEAS - MV V2 VTI: 20.1 CM
BH CV ECHO MEAS - MVA(P1/2T): 3.6 CM2
BH CV ECHO MEAS - MVA(VTI): 2.7 CM2
BH CV ECHO MEAS - PA ACC TIME: 0.11 SEC
BH CV ECHO MEAS - PA V2 MAX: 107 CM/SEC
BH CV ECHO MEAS - PI END-D VEL: 114 CM/SEC
BH CV ECHO MEAS - RAP SYSTOLE: 15 MMHG
BH CV ECHO MEAS - RVSP: 76 MMHG
BH CV ECHO MEAS - SV(LVOT): 54.7 ML
BH CV ECHO MEAS - SV(MOD-SP2): 29.9 ML
BH CV ECHO MEAS - SV(MOD-SP4): 52.6 ML
BH CV ECHO MEAS - TAPSE (>1.6): 1.95 CM
BH CV ECHO MEAS - TR MAX PG: 60.8 MMHG
BH CV ECHO MEAS - TR MAX VEL: 390 CM/SEC
BH CV ECHO MEASUREMENTS AVERAGE E/E' RATIO: 8.72
BH CV XLRA - RV BASE: 4 CM
BH CV XLRA - RV LENGTH: 6.5 CM
BH CV XLRA - RV MID: 3.1 CM
BH CV XLRA - TDI S': 11.3 CM/SEC
BUN SERPL-MCNC: 15 MG/DL (ref 8–23)
BUN/CREAT SERPL: 22.1 (ref 7–25)
CALCIUM SPEC-SCNC: 8.8 MG/DL (ref 8.6–10.5)
CHLORIDE SERPL-SCNC: 106 MMOL/L (ref 98–107)
CO2 SERPL-SCNC: 28 MMOL/L (ref 22–29)
CREAT SERPL-MCNC: 0.68 MG/DL (ref 0.57–1)
DEPRECATED RDW RBC AUTO: 52.5 FL (ref 37–54)
EGFRCR SERPLBLD CKD-EPI 2021: 98 ML/MIN/1.73
EOSINOPHIL # BLD AUTO: 0 10*3/MM3 (ref 0–0.4)
EOSINOPHIL NFR BLD AUTO: 0 % (ref 0.3–6.2)
ERYTHROCYTE [DISTWIDTH] IN BLOOD BY AUTOMATED COUNT: 13.7 % (ref 12.3–15.4)
GEN 5 2HR TROPONIN T REFLEX: 13 NG/L
GLUCOSE SERPL-MCNC: 197 MG/DL (ref 65–99)
HCT VFR BLD AUTO: 44 % (ref 34–46.6)
HGB BLD-MCNC: 14.2 G/DL (ref 12–15.9)
IMM GRANULOCYTES # BLD AUTO: 0.01 10*3/MM3 (ref 0–0.05)
IMM GRANULOCYTES NFR BLD AUTO: 0.2 % (ref 0–0.5)
LEFT ATRIUM VOLUME INDEX: 27.4 ML/M2
LYMPHOCYTES # BLD AUTO: 0.59 10*3/MM3 (ref 0.7–3.1)
LYMPHOCYTES NFR BLD AUTO: 12.1 % (ref 19.6–45.3)
MAGNESIUM SERPL-MCNC: 1.7 MG/DL (ref 1.6–2.4)
MCH RBC QN AUTO: 33.3 PG (ref 26.6–33)
MCHC RBC AUTO-ENTMCNC: 32.3 G/DL (ref 31.5–35.7)
MCV RBC AUTO: 103 FL (ref 79–97)
MONOCYTES # BLD AUTO: 0.09 10*3/MM3 (ref 0.1–0.9)
MONOCYTES NFR BLD AUTO: 1.9 % (ref 5–12)
NEUTROPHILS NFR BLD AUTO: 4.16 10*3/MM3 (ref 1.7–7)
NEUTROPHILS NFR BLD AUTO: 85.6 % (ref 42.7–76)
NRBC BLD AUTO-RTO: 0 /100 WBC (ref 0–0.2)
PHOSPHATE SERPL-MCNC: 3.4 MG/DL (ref 2.5–4.5)
PLATELET # BLD AUTO: 225 10*3/MM3 (ref 140–450)
PMV BLD AUTO: 10.4 FL (ref 6–12)
POTASSIUM SERPL-SCNC: 4.3 MMOL/L (ref 3.5–5.2)
QT INTERVAL: 428 MS
QT INTERVAL: 436 MS
QT INTERVAL: 440 MS
QTC INTERVAL: 473 MS
QTC INTERVAL: 477 MS
QTC INTERVAL: 478 MS
RBC # BLD AUTO: 4.27 10*6/MM3 (ref 3.77–5.28)
SODIUM SERPL-SCNC: 144 MMOL/L (ref 136–145)
TROPONIN T DELTA: -1 NG/L
TROPONIN T SERPL HS-MCNC: 14 NG/L
TROPONIN T SERPL HS-MCNC: 20 NG/L
WBC NRBC COR # BLD: 4.86 10*3/MM3 (ref 3.4–10.8)

## 2023-09-17 PROCEDURE — 94799 UNLISTED PULMONARY SVC/PX: CPT

## 2023-09-17 PROCEDURE — 85025 COMPLETE CBC W/AUTO DIFF WBC: CPT | Performed by: NURSE PRACTITIONER

## 2023-09-17 PROCEDURE — 84484 ASSAY OF TROPONIN QUANT: CPT | Performed by: PHYSICIAN ASSISTANT

## 2023-09-17 PROCEDURE — 25010000002 ENOXAPARIN PER 10 MG: Performed by: NURSE PRACTITIONER

## 2023-09-17 PROCEDURE — 93005 ELECTROCARDIOGRAM TRACING: CPT | Performed by: NURSE PRACTITIONER

## 2023-09-17 PROCEDURE — 70450 CT HEAD/BRAIN W/O DYE: CPT

## 2023-09-17 PROCEDURE — 97162 PT EVAL MOD COMPLEX 30 MIN: CPT

## 2023-09-17 PROCEDURE — 84484 ASSAY OF TROPONIN QUANT: CPT | Performed by: NURSE PRACTITIONER

## 2023-09-17 PROCEDURE — 93306 TTE W/DOPPLER COMPLETE: CPT

## 2023-09-17 PROCEDURE — 93010 ELECTROCARDIOGRAM REPORT: CPT | Performed by: INTERNAL MEDICINE

## 2023-09-17 PROCEDURE — 94761 N-INVAS EAR/PLS OXIMETRY MLT: CPT

## 2023-09-17 PROCEDURE — 84100 ASSAY OF PHOSPHORUS: CPT | Performed by: NURSE PRACTITIONER

## 2023-09-17 PROCEDURE — 80048 BASIC METABOLIC PNL TOTAL CA: CPT | Performed by: NURSE PRACTITIONER

## 2023-09-17 PROCEDURE — 93306 TTE W/DOPPLER COMPLETE: CPT | Performed by: INTERNAL MEDICINE

## 2023-09-17 PROCEDURE — 25010000002 METHYLPREDNISOLONE PER 40 MG: Performed by: NURSE PRACTITIONER

## 2023-09-17 PROCEDURE — 97530 THERAPEUTIC ACTIVITIES: CPT

## 2023-09-17 PROCEDURE — 94664 DEMO&/EVAL PT USE INHALER: CPT

## 2023-09-17 PROCEDURE — 83735 ASSAY OF MAGNESIUM: CPT | Performed by: NURSE PRACTITIONER

## 2023-09-17 RX ORDER — SODIUM CHLORIDE 0.9 % (FLUSH) 0.9 %
10 SYRINGE (ML) INJECTION AS NEEDED
Status: DISCONTINUED | OUTPATIENT
Start: 2023-09-17 | End: 2023-09-20 | Stop reason: HOSPADM

## 2023-09-17 RX ORDER — BISACODYL 10 MG
10 SUPPOSITORY, RECTAL RECTAL DAILY PRN
Status: DISCONTINUED | OUTPATIENT
Start: 2023-09-17 | End: 2023-09-20 | Stop reason: HOSPADM

## 2023-09-17 RX ORDER — IPRATROPIUM BROMIDE AND ALBUTEROL SULFATE 2.5; .5 MG/3ML; MG/3ML
3 SOLUTION RESPIRATORY (INHALATION)
Status: DISCONTINUED | OUTPATIENT
Start: 2023-09-17 | End: 2023-09-17

## 2023-09-17 RX ORDER — OXYCODONE AND ACETAMINOPHEN 10; 325 MG/1; MG/1
1 TABLET ORAL EVERY 4 HOURS PRN
Status: DISCONTINUED | OUTPATIENT
Start: 2023-09-17 | End: 2023-09-20 | Stop reason: HOSPADM

## 2023-09-17 RX ORDER — CALCIUM CARBONATE 500 MG/1
2 TABLET, CHEWABLE ORAL 2 TIMES DAILY PRN
Status: DISCONTINUED | OUTPATIENT
Start: 2023-09-17 | End: 2023-09-20 | Stop reason: HOSPADM

## 2023-09-17 RX ORDER — BISACODYL 5 MG/1
5 TABLET, DELAYED RELEASE ORAL DAILY PRN
Status: DISCONTINUED | OUTPATIENT
Start: 2023-09-17 | End: 2023-09-20 | Stop reason: HOSPADM

## 2023-09-17 RX ORDER — ALBUTEROL SULFATE 2.5 MG/3ML
2.5 SOLUTION RESPIRATORY (INHALATION) EVERY 6 HOURS PRN
Status: DISCONTINUED | OUTPATIENT
Start: 2023-09-17 | End: 2023-09-17

## 2023-09-17 RX ORDER — ACETAMINOPHEN 325 MG/1
650 TABLET ORAL EVERY 4 HOURS PRN
Status: DISCONTINUED | OUTPATIENT
Start: 2023-09-17 | End: 2023-09-20 | Stop reason: HOSPADM

## 2023-09-17 RX ORDER — POTASSIUM CHLORIDE 20 MEQ/1
40 TABLET, EXTENDED RELEASE ORAL EVERY 4 HOURS
Status: COMPLETED | OUTPATIENT
Start: 2023-09-17 | End: 2023-09-17

## 2023-09-17 RX ORDER — AMOXICILLIN 250 MG
2 CAPSULE ORAL 2 TIMES DAILY
Status: DISCONTINUED | OUTPATIENT
Start: 2023-09-17 | End: 2023-09-20 | Stop reason: HOSPADM

## 2023-09-17 RX ORDER — SODIUM CHLORIDE 9 MG/ML
40 INJECTION, SOLUTION INTRAVENOUS AS NEEDED
Status: DISCONTINUED | OUTPATIENT
Start: 2023-09-17 | End: 2023-09-20 | Stop reason: HOSPADM

## 2023-09-17 RX ORDER — POLYETHYLENE GLYCOL 3350 17 G/17G
17 POWDER, FOR SOLUTION ORAL DAILY PRN
Status: DISCONTINUED | OUTPATIENT
Start: 2023-09-17 | End: 2023-09-20 | Stop reason: HOSPADM

## 2023-09-17 RX ORDER — OXYCODONE AND ACETAMINOPHEN 10; 325 MG/1; MG/1
1 TABLET ORAL EVERY 4 HOURS PRN
COMMUNITY
Start: 2023-09-14

## 2023-09-17 RX ORDER — ALBUTEROL SULFATE 2.5 MG/3ML
2.5 SOLUTION RESPIRATORY (INHALATION)
Status: DISCONTINUED | OUTPATIENT
Start: 2023-09-17 | End: 2023-09-20 | Stop reason: HOSPADM

## 2023-09-17 RX ORDER — MULTIPLE VITAMINS W/ MINERALS TAB 9MG-400MCG
1 TAB ORAL DAILY
Status: DISCONTINUED | OUTPATIENT
Start: 2023-09-17 | End: 2023-09-20 | Stop reason: HOSPADM

## 2023-09-17 RX ORDER — IPRATROPIUM BROMIDE AND ALBUTEROL SULFATE 2.5; .5 MG/3ML; MG/3ML
3 SOLUTION RESPIRATORY (INHALATION) EVERY 4 HOURS PRN
Status: DISCONTINUED | OUTPATIENT
Start: 2023-09-17 | End: 2023-09-20 | Stop reason: HOSPADM

## 2023-09-17 RX ORDER — DOXYCYCLINE 100 MG/1
100 CAPSULE ORAL EVERY 12 HOURS SCHEDULED
Status: DISCONTINUED | OUTPATIENT
Start: 2023-09-17 | End: 2023-09-20 | Stop reason: HOSPADM

## 2023-09-17 RX ORDER — BUDESONIDE AND FORMOTEROL FUMARATE DIHYDRATE 160; 4.5 UG/1; UG/1
2 AEROSOL RESPIRATORY (INHALATION)
Status: DISCONTINUED | OUTPATIENT
Start: 2023-09-17 | End: 2023-09-20 | Stop reason: HOSPADM

## 2023-09-17 RX ORDER — ACETAMINOPHEN 650 MG/1
650 SUPPOSITORY RECTAL EVERY 4 HOURS PRN
Status: DISCONTINUED | OUTPATIENT
Start: 2023-09-17 | End: 2023-09-20 | Stop reason: HOSPADM

## 2023-09-17 RX ORDER — ACETAMINOPHEN 160 MG/5ML
650 SOLUTION ORAL EVERY 4 HOURS PRN
Status: DISCONTINUED | OUTPATIENT
Start: 2023-09-17 | End: 2023-09-20 | Stop reason: HOSPADM

## 2023-09-17 RX ORDER — METHYLPREDNISOLONE SODIUM SUCCINATE 40 MG/ML
40 INJECTION, POWDER, LYOPHILIZED, FOR SOLUTION INTRAMUSCULAR; INTRAVENOUS EVERY 8 HOURS
Status: DISCONTINUED | OUTPATIENT
Start: 2023-09-17 | End: 2023-09-19

## 2023-09-17 RX ORDER — SODIUM CHLORIDE 0.9 % (FLUSH) 0.9 %
10 SYRINGE (ML) INJECTION EVERY 12 HOURS SCHEDULED
Status: DISCONTINUED | OUTPATIENT
Start: 2023-09-17 | End: 2023-09-20 | Stop reason: HOSPADM

## 2023-09-17 RX ORDER — ENOXAPARIN SODIUM 100 MG/ML
30 INJECTION SUBCUTANEOUS DAILY
Status: DISCONTINUED | OUTPATIENT
Start: 2023-09-17 | End: 2023-09-20 | Stop reason: HOSPADM

## 2023-09-17 RX ADMIN — Medication 10 ML: at 08:42

## 2023-09-17 RX ADMIN — DOXYCYCLINE 100 MG: 100 CAPSULE ORAL at 08:41

## 2023-09-17 RX ADMIN — MULTIPLE VITAMINS W/ MINERALS TAB 1 TABLET: TAB at 08:41

## 2023-09-17 RX ADMIN — METHYLPREDNISOLONE SODIUM SUCCINATE 40 MG: 40 INJECTION, POWDER, LYOPHILIZED, FOR SOLUTION INTRAMUSCULAR; INTRAVENOUS at 15:05

## 2023-09-17 RX ADMIN — TIOTROPIUM BROMIDE INHALATION SPRAY 2 PUFF: 3.12 SPRAY, METERED RESPIRATORY (INHALATION) at 07:26

## 2023-09-17 RX ADMIN — METHYLPREDNISOLONE SODIUM SUCCINATE 40 MG: 40 INJECTION, POWDER, LYOPHILIZED, FOR SOLUTION INTRAMUSCULAR; INTRAVENOUS at 23:29

## 2023-09-17 RX ADMIN — ALBUTEROL SULFATE 2.5 MG: 2.5 SOLUTION RESPIRATORY (INHALATION) at 15:59

## 2023-09-17 RX ADMIN — PANCRELIPASE 36000 UNITS OF LIPASE: 36000; 180000; 114000 CAPSULE, DELAYED RELEASE PELLETS ORAL at 17:01

## 2023-09-17 RX ADMIN — ENOXAPARIN SODIUM 30 MG: 30 INJECTION SUBCUTANEOUS at 08:41

## 2023-09-17 RX ADMIN — IPRATROPIUM BROMIDE AND ALBUTEROL SULFATE 3 ML: 2.5; .5 SOLUTION RESPIRATORY (INHALATION) at 11:59

## 2023-09-17 RX ADMIN — SENNOSIDES AND DOCUSATE SODIUM 2 TABLET: 8.6; 5 TABLET ORAL at 20:02

## 2023-09-17 RX ADMIN — PANCRELIPASE 36000 UNITS OF LIPASE: 36000; 180000; 114000 CAPSULE, DELAYED RELEASE PELLETS ORAL at 11:30

## 2023-09-17 RX ADMIN — PANCRELIPASE 36000 UNITS OF LIPASE: 36000; 180000; 114000 CAPSULE, DELAYED RELEASE PELLETS ORAL at 08:41

## 2023-09-17 RX ADMIN — BUDESONIDE AND FORMOTEROL FUMARATE DIHYDRATE 2 PUFF: 160; 4.5 AEROSOL RESPIRATORY (INHALATION) at 19:48

## 2023-09-17 RX ADMIN — Medication 10 ML: at 20:03

## 2023-09-17 RX ADMIN — SENNOSIDES AND DOCUSATE SODIUM 2 TABLET: 8.6; 5 TABLET ORAL at 08:41

## 2023-09-17 RX ADMIN — IPRATROPIUM BROMIDE AND ALBUTEROL SULFATE 3 ML: 2.5; .5 SOLUTION RESPIRATORY (INHALATION) at 07:25

## 2023-09-17 RX ADMIN — BUDESONIDE AND FORMOTEROL FUMARATE DIHYDRATE 2 PUFF: 160; 4.5 AEROSOL RESPIRATORY (INHALATION) at 07:25

## 2023-09-17 RX ADMIN — OXYCODONE HYDROCHLORIDE AND ACETAMINOPHEN 1 TABLET: 10; 325 TABLET ORAL at 17:14

## 2023-09-17 RX ADMIN — POTASSIUM CHLORIDE 40 MEQ: 1500 TABLET, EXTENDED RELEASE ORAL at 08:41

## 2023-09-17 RX ADMIN — ALBUTEROL SULFATE 2.5 MG: 2.5 SOLUTION RESPIRATORY (INHALATION) at 19:48

## 2023-09-17 RX ADMIN — DOXYCYCLINE 100 MG: 100 CAPSULE ORAL at 20:02

## 2023-09-17 RX ADMIN — POTASSIUM CHLORIDE 40 MEQ: 1500 TABLET, EXTENDED RELEASE ORAL at 04:40

## 2023-09-17 RX ADMIN — METHYLPREDNISOLONE SODIUM SUCCINATE 40 MG: 40 INJECTION, POWDER, LYOPHILIZED, FOR SOLUTION INTRAMUSCULAR; INTRAVENOUS at 08:42

## 2023-09-17 RX ADMIN — ACETAMINOPHEN 650 MG: 325 TABLET ORAL at 17:01

## 2023-09-17 NOTE — H&P
[facility] Medicine Services  HISTORY AND PHYSICAL    Patient Name: Mary West  : 1960  MRN: [id number]  Primary Care Physician: Provider, No Known  Date of admission: 2023    Subjective   Subjective     Chief Complaint:  Shortness of breath, syncope     HPI:  Mary West is a 63 y.o. female with PMH significant for COPD with chronic respiratory failure on 2 LNC, chronic pancreatitis, malnutrition, who presents to the ED with complaint of shortness of breath and syncope.  She states that over the past week she has had progressive shortness of breath.  Today the shortness of breath was significantly increased despite using her oxygen and inhalers at home.  She also reports dizziness and lightheadedness with standing and 1 episode of syncope this morning.  She denies fever, chest pain, nausea, vomiting, diarrhea.  Upon arrival to the ED, she is noted to have mildly elevated troponin and elevated proBNP.  She is also noted to be hypokalemic.  Respiratory panel is negative.  She did have elevated D-dimer with CTA chest negative for PE and concern for stable small bilateral pleural effusions and severe emphysema.  She was given Solu-Medrol and DuoNeb treatment while in the ED.  She will be admitted to hospital medicine for further evaluation.    Review of Systems   Constitutional:  Positive for activity change. Negative for appetite change, chills, diaphoresis, fatigue, fever and unexpected weight change.   HENT: Negative.  Negative for congestion, sneezing, sore throat and trouble swallowing.    Eyes: Negative.  Negative for visual disturbance.   Respiratory:  Positive for chest tightness, shortness of breath and wheezing. Negative for cough.    Cardiovascular: Negative.  Negative for chest pain, palpitations and leg swelling.   Gastrointestinal: Negative.  Negative for abdominal distention, abdominal pain, constipation, diarrhea, nausea and vomiting.   Endocrine: Negative.   Negative for polydipsia and polyphagia.   Genitourinary: Negative.  Negative for difficulty urinating, dysuria, frequency and urgency.   Musculoskeletal: Negative.  Negative for arthralgias, back pain, gait problem, myalgias and neck pain.   Skin: Negative.  Negative for color change, pallor, rash and wound.   Allergic/Immunologic: Negative.  Negative for immunocompromised state.   Neurological:  Positive for dizziness, syncope, weakness and light-headedness. Negative for seizures, facial asymmetry, speech difficulty, numbness and headaches.   Hematological: Negative.  Does not bruise/bleed easily.   Psychiatric/Behavioral:  Negative for confusion. The patient is not nervous/anxious.               Personal History     Past Medical History:   Diagnosis Date    Alcohol abuse     Quit early 2000's    COPD (chronic obstructive pulmonary disease)     Pancreatitis          No past surgical history on file.    Family History:  family history includes Heart failure in her mother; Stroke in her father and mother.     Social History:  reports that she has been smoking cigarettes. She has a 20.00 pack-year smoking history. She has never used smokeless tobacco. She reports that she does not drink alcohol and does not use drugs.  Social History     Social History Narrative    Not on file       Medications:  Fluticasone-Umeclidin-Vilant, Pancrelipase (Lip-Prot-Amyl), albuterol sulfate HFA, calcium carbonate, ipratropium-albuterol, multivitamin with minerals, and predniSONE    Allergies   Allergen Reactions    Green Pepper Swelling     Throat and face    Mushroom Swelling     Throat and face    Methadone Swelling       Objective   Objective     Vital Signs:   Temp:  [98.3 °F (36.8 °C)] 98.3 °F (36.8 °C)  Heart Rate:  [67-90] 68  Resp:  [18-20] 18  BP: (141-164)/(77-94) 145/77  Flow (L/min):  [6] 6    Physical Exam   Constitutional: Awake, alert, cachetic, temporal muscle wasting   Eyes: PERRLA, sclerae anicteric, no conjunctival  injection  HENT: NCAT, mucous membranes moist  Neck: Supple, no thyromegaly, no lymphadenopathy, trachea midline  Respiratory: decreased to auscultation bilaterally, nonlabored respirations, 6L NC in place.    Cardiovascular: RRR, no murmurs, rubs, or gallops, palpable pedal pulses bilaterally  Gastrointestinal: Positive bowel sounds, soft, nontender, nondistended  Musculoskeletal: No bilateral ankle edema, no clubbing or cyanosis to extremities  Psychiatric: Appropriate affect, cooperative  Neurologic: Oriented x 3, strength symmetric in all extremities, Cranial Nerves grossly intact to confrontation, speech clear  Skin: No rashes      Result Review:  I have personally reviewed the results from the time of this admission to 9/17/2023 01:46 EDT and agree with these findings:  [x]  Laboratory list / accordion  [x]  Microbiology  [x]  Radiology  []  EKG/Telemetry   []  Cardiology/Vascular   []  Pathology  [x]  Old records  []  Other:  Most notable findings include:     LAB RESULTS:      Lab 09/16/23 2010   WBC 8.27   HEMOGLOBIN 13.6   HEMATOCRIT 42.6   PLATELETS 232   NEUTROS ABS 5.48   IMMATURE GRANS (ABS) 0.03   LYMPHS ABS 2.10   MONOS ABS 0.51   EOS ABS 0.10   .6*   CRP <0.30   PROCALCITONIN 0.04   LACTATE 2.0   *   D DIMER QUANT 1.11*         Lab 09/16/23 2010   SODIUM 146*   POTASSIUM 3.3*   CHLORIDE 106   CO2 30.0*   ANION GAP 10.0   BUN 13   CREATININE 0.55*   EGFR 103.1   GLUCOSE 93   CALCIUM 9.2         Lab 09/16/23 2010   TOTAL PROTEIN 6.3   ALBUMIN 4.1   GLOBULIN 2.2   ALT (SGPT) 10   AST (SGOT) 22   BILIRUBIN 0.6   ALK PHOS 82         Lab 09/17/23  0029 09/16/23 2010   PROBNP  --  7,481.0*   HSTROP T 20* 24*                 Lab 09/16/23 2039   PH, ARTERIAL 7.435   PCO2, ARTERIAL 45.5*   PO2 ART 71.4*   FIO2 44   HCO3 ART 30.6*   BASE EXCESS ART 5.5*   CARBOXYHEMOGLOBIN 3.9*     Brief Urine Lab Results       None          Microbiology Results (last 10 days)       Procedure Component Value  - Date/Time    COVID PRE-OP / PRE-PROCEDURE SCREENING ORDER (NO ISOLATION) - Swab, Nasopharynx [[id number]]  (Normal) Collected: 09/16/23 2021    Lab Status: Final result Specimen: Swab from Nasopharynx Updated: 09/16/23 2119    Narrative:      The following orders were created for panel order COVID PRE-OP / PRE-PROCEDURE SCREENING ORDER (NO ISOLATION) - Swab, Nasopharynx.  Procedure                               Abnormality         Status                     ---------                               -----------         ------                     Respiratory Panel PCR w/...[[id number]]  Normal              Final result                 Please view results for these tests on the individual orders.    Respiratory Panel PCR w/COVID-19(SARS-CoV-2) JONAS/RINA/NAFISA/PAD/COR/MAD/SALOME In-House, NP Swab in UTM/VTM, 3-4 HR TAT - Swab, Nasopharynx [[id number]]  (Normal) Collected: 09/16/23 2021    Lab Status: Final result Specimen: Swab from Nasopharynx Updated: 09/16/23 2119     ADENOVIRUS, PCR Not Detected     Coronavirus 229E Not Detected     Coronavirus HKU1 Not Detected     Coronavirus NL63 Not Detected     Coronavirus OC43 Not Detected     COVID19 Not Detected     Human Metapneumovirus Not Detected     Human Rhinovirus/Enterovirus Not Detected     Influenza A PCR Not Detected     Influenza B PCR Not Detected     Parainfluenza Virus 1 Not Detected     Parainfluenza Virus 2 Not Detected     Parainfluenza Virus 3 Not Detected     Parainfluenza Virus 4 Not Detected     RSV, PCR Not Detected     Bordetella pertussis pcr Not Detected     Bordetella parapertussis PCR Not Detected     Chlamydophila pneumoniae PCR Not Detected     Mycoplasma pneumo by PCR Not Detected    Narrative:      In the setting of a positive respiratory panel with a viral infection PLUS a negative procalcitonin without other underlying concern for bacterial infection, consider observing off antibiotics or discontinuation of antibiotics and continue supportive care. If  the respiratory panel is positive for atypical bacterial infection (Bordetella pertussis, Chlamydophila pneumoniae, or Mycoplasma pneumoniae), consider antibiotic de-escalation to target atypical bacterial infection.            XR Chest 1 View    Result Date: 9/16/2023  XR CHEST 1 VW Date of Exam: 9/16/2023 8:40 PM EDT Indication: shortness of breath Comparison: Chest CT 7/17/2023 and chest radiograph same date. Findings: There is diffuse interstitial prominence and there are cystic areas of lucency in both lungs. There are stable small bilateral pleural effusions. There is improved left midlung airspace disease. No definite new focal lung opacity. No pneumothorax. Heart size is unchanged. Bones are demineralized. No definite acute osseous abnormality. Stable mild lower thoracic compression deformity.     Impression: Impression: 1.Improved left midlung airspace disease. 2.Stable small bilateral pleural effusions. 3.Stable chronic changes in the lungs. Electronically Signed: Bishop Anand MD  9/16/2023 9:17 PM EDT  Workstation ID: [id number]    CT Angiogram Chest    Result Date: 9/16/2023  CT ANGIOGRAM CHEST Date of Exam: 9/16/2023 11:26 PM EDT Indication: SOA, hypoxia, positive D Dimer, r/o PE. Comparison: 7/17/2023. Technique: CTA of the chest was performed after the uneventful intravenous administration of 75 mL Isovue-370. Reconstructed coronal and sagittal images were also obtained. In addition, a 3-D volume rendered image was created for interpretation. Automated exposure control and iterative reconstruction methods were used. Findings: There is no evidence of pulmonary embolus. Stable small bilateral pleural effusions, slightly larger on the right. Stable severe emphysema. Interlobular septal thickening may represent mild superimposed interstitial edema. There is stable scarring along the posterior left lower lobe superiorly. There is no pneumothorax or lobar consolidation. Airways appear patent. Thyroid is not  well seen. There is mild aortic atherosclerosis. The heart is overall normal size, but the right ventricle appears enlarged. The effusion. There is mild coronary artery calcification. No mediastinal lymphadenopathy. No acute findings in the superficial soft tissues. No acute findings in the limited evaluation of the upper abdomen. Patient is status post cholecystectomy. There is mild pneumobilia. This is unchanged. There are no acute osseous abnormalities or destructive bone lesions. There is a stable mild compression deformity at T12. Bones are demineralized. Stable ununited chronic left lateral eighth rib fracture.     Impression: Impression: 1.No evidence of pulmonary embolus. 2.Stable small bilateral pleural effusions, slightly larger on the right. 3.Stable severe emphysema. 4.Stable enlargement of the right ventricle and mild coronary artery calcification. Electronically Signed: Bishop Anand MD  9/16/2023 11:52 PM EDT  Workstation ID: [id number]     Results for orders placed during the hospital encounter of 01/30/22    Adult Transthoracic Echo Complete W/ Cont if Necessary Per Protocol    Interpretation Summary  · Estimated right ventricular systolic pressure from tricuspid regurgitation is normal (<35 mmHg). Calculated right ventricular systolic pressure from tricuspid regurgitation is 29 mmHg.  · Estimated left ventricular EF = 50% Left ventricular systolic function is normal.      Assessment & Plan   Assessment & Plan       COPD with acute exacerbation    Acute on chronic respiratory failure with hypoxia and hypercapnia    Chronic Pancreatitis    Severe malnutrition    COPD (chronic obstructive pulmonary disease)    Hypokalemia    Elevated troponin    Syncope    63 y.o. female with PMH significant for COPD with chronic respiratory failure on 2 LNC, chronic pancreatitis, malnutrition, who presents to the ED with complaint of shortness of breath and syncope who was found to have concern for COPD with acute  exacerbation.    Acute on chronic respiratory failure with hypercapnia and hypoxia  COPD exacerbation  - Baseline supplemental oxygen to L NC, currently on 6 LNC  - Scheduled and as needed DuoNebs  - Solu-Medrol 80 mg given in the ED, continue 40 mg 3 times daily for now with plan to taper as appropriate  - COPD navigator  - Consider pulmonary consult in the a.m.  - Hold antibiotic therapy for now, no increased cough or sputum production  - CBC, BMP in the a.m.    Syncope  - Orthostatic BP  - Likely related to hypoxia  - Echo in the a.m.  - Fall precautions  - CT head pending    Elevated troponin  Elevated proBNP  - Troponin trending down  - Denies chest pain  - Echo in the a.m.  - Does not appear volume overloaded, stable pleural effusions noted on CTA chest    Hypokalemia  - Electrolyte replacement  - BMP, magnesium in the a.m    Chronic pancreatitis  - Continue Creon    Severe malnutrition  - Nutritional consult in the a.m.    Tobacco abuse  - Reports longer smoking, but now using vapes  - Encourage cessation    DVT prophylaxis: lovenox     CODE STATUS:         Expected Discharge  Expected Discharge Date: 9/20/2023; Expected Discharge Time:       This note has been completed as part of a split-shared workflow.     Signature: Electronically signed by RAFA Pepper, 09/17/23, 2:01 AM EDT.            Attending   Admission Attestation       I have performed an independent face-to-face diagnostic evaluation including performing an independent physical examination.  The documented plan of care above was reviewed and developed with the advanced practice clinician (APC).  I have updated the HPI as appropriate.    Brief HPI    63 year old with heavy smoking history, COPD with worsening shortness of breath. Recently quit cigarettes but started vaping. No fevers, chills, chest pain. Reports feeling much better now.    Attending Physical Exam:  Temp:  [98.3 °F (36.8 °C)] 98.3 °F (36.8 °C)  Heart Rate:  [67-90]  68  Resp:  [18-20] 18  BP: (141-164)/(77-94) 145/77  Flow (L/min):  [6] 6    Awake, alert, oriented x3  Comfortable  Diminished air movement bilaterally w/o rales or rhonchi  Heart RRR no m/r/g  Abdomen soft  No LE edema    Assessment and Plan:    COPD exacerbation  -mild hypercapnia on blood gas  -on 6L currently saturating 88%, appears comfortable  -c/w nebs, IV steroids, doxy  -needs consistent follow up with pulm    Elevated BNP  Small pleural effusions  -check echo in am, last echo 2022 50% EF    Agree with plan per APC note    See assessment and plan documented by APC above and updated/edited by me as appropriate.    Raman Savage MD  09/17/23

## 2023-09-17 NOTE — THERAPY EVALUATION
Patient Name: Mary West  : 1960    MRN: [id number]                              Today's Date: 2023       Admit Date: 2023    Visit Dx:     ICD-10-CM ICD-9-CM   1. Acute on chronic respiratory failure with hypoxia  J96.21 518.84     799.02   2. Acute exacerbation of chronic obstructive pulmonary disease (COPD)  J44.1 491.21   3. Bilateral pleural effusion  J90 511.9   4. Hypoxia  R09.02 799.02   5. Tobacco dependence  F17.200 305.1   6. Malnutrition, unspecified type  E46 263.9     Patient Active Problem List   Diagnosis    COVID-19    COPD with acute exacerbation    Acute on chronic respiratory failure with hypoxia and hypercapnia    Acute respiratory failure due to COVID-19    Remote history alcohol abuse    Tobacco dependence    CAP (community acquired pneumonia)    Chronic Pancreatitis    Chronic narcotic use    Cytokine release syndrome, grade 2    Hypoglycemia    COPD exacerbation    Acute exacerbation of chronic obstructive pulmonary disease (COPD)    Severe malnutrition    COPD (chronic obstructive pulmonary disease)    Hypokalemia    Elevated troponin    Syncope     Past Medical History:   Diagnosis Date    Alcohol abuse     Quit early     COPD (chronic obstructive pulmonary disease)     Pancreatitis      History reviewed. No pertinent surgical history.   General Information       Row Name 23 1500          Physical Therapy Time and Intention    Document Type evaluation  -ML     Mode of Treatment physical therapy  -ML       Row Name 23 1500          General Information    Patient Profile Reviewed yes  -ML     Prior Level of Function independent:;all household mobility;community mobility;gait;transfer;ADL's;home management;cooking;cleaning;driving;shopping;using stairs;work  patient reports she only wear 2 LPM while at home, does not wear the oxygen outside of the house because the tanks are too heavy to carry  -ML     Existing Precautions/Restrictions oxygen therapy  device and L/min  oxygen desaturaiton  -ML     Barriers to Rehab medically complex  -ML       Row Name 09/17/23 1500          Living Environment    People in Home alone  -ML       Row Name 09/17/23 1500          Home Main Entrance    Number of Stairs, Main Entrance other (see comments)  13  -ML       Row Name 09/17/23 1500          Stairs Within Home, Primary    Number of Stairs, Within Home, Primary none  -ML       Row Name 09/17/23 1500          Cognition    Orientation Status (Cognition) oriented x 4  -ML       Row Name 09/17/23 1500          Safety Issues, Functional Mobility    Safety Issues Affecting Function (Mobility) insight into deficits/self-awareness;safety precaution awareness  -ML     Impairments Affecting Function (Mobility) endurance/activity tolerance;shortness of breath;strength  -ML               User Key  (r) = Recorded By, (t) = Taken By, (c) = Cosigned By      Initials Name Provider Type    ML Tika Sandoval Physical Therapist                   Mobility       Row Name 09/17/23 1502          Bed Mobility    Bed Mobility supine-sit  -ML     Supine-Sit Isabella (Bed Mobility) standby assist  -ML       Row Name 09/17/23 1502          Sit-Stand Transfer    Sit-Stand Isabella (Transfers) standby assist  -ML     Assistive Device (Sit-Stand Transfers) other (see comments)  no AD  -ML       Row Name 09/17/23 1502          Gait/Stairs (Locomotion)    [place] Level (Gait) supervision  -ML     Assistive Device (Gait) other (see comments)  no AD  -ML     Distance in Feet (Gait) 25  -ML     Deviations/Abnormal Patterns (Gait) bilateral deviations;base of support, narrow;yuli decreased;gait speed decreased  -ML     Comment, (Gait/Stairs) Patient remained on 6 LPM during ambulation with oxygen desaturation to 78%, with seated recovery patient with improvement to 87%. Patient cued for pursed lip and paced breathing.  -ML               User Key  (r) = Recorded By, (t) = Taken By, (c) = Cosigned By       Initials Name Provider Type    Tika Greenwood Physical Therapist                   Obj/Interventions       Row Name 09/17/23 1505          Range of Motion Comprehensive    General Range of Motion bilateral lower extremity ROM WFL  -ML       Row Name 09/17/23 1505          Strength Comprehensive (MMT)    General Manual Muscle Testing (MMT) Assessment lower extremity strength deficits identified  -ML     Comment, General Manual Muscle Testing (MMT) Assessment BLE grossly 4/5 observed with mobility  -ML       Row Name 09/17/23 1505          Motor Skills    Motor Skills functional endurance  -ML     Functional Endurance oxygen desaturation to 78% while on 6 LPM during ambulation in room, with seated rest break patient recovered to 87%  -ML       Row Name 09/17/23 1505          Balance    Balance Assessment sitting static balance;sitting dynamic balance;sit to stand dynamic balance;standing static balance;standing dynamic balance  -ML     Static Sitting Balance independent  -ML     Dynamic Sitting Balance independent  -ML     Position, Sitting Balance unsupported;sitting edge of bed  -ML     Sit to Stand Dynamic Balance standby assist  -ML     Static Standing Balance standby assist  -ML     Dynamic Standing Balance supervision  -ML     Position/Device Used, Standing Balance unsupported  -ML     Balance Interventions sitting;standing;sit to stand;occupation based/functional task  -ML               User Key  (r) = Recorded By, (t) = Taken By, (c) = Cosigned By      Initials Name Provider Type    ML Tika Sandoval Physical Therapist                   Goals/Plan       Row Name 09/17/23 1509          Bed Mobility Goal 1 (PT)    Activity/Assistive Device (Bed Mobility Goal 1, PT) sit to supine/supine to sit  -ML     [place] Level/Cues Needed (Bed Mobility Goal 1, PT) independent  -ML     Time Frame (Bed Mobility Goal 1, PT) 10 days  -ML       Row Name 09/17/23 1500          Transfer Goal 1 (PT)    Activity/Assistive  Device (Transfer Goal 1, PT) sit-to-stand/stand-to-sit;bed-to-chair/chair-to-bed  -ML     [place] Level/Cues Needed (Transfer Goal 1, PT) independent  -ML     Time Frame (Transfer Goal 1, PT) 10 days  -ML       Row Name 09/17/23 1509          Gait Training Goal 1 (PT)    Activity/Assistive Device (Gait Training Goal 1, PT) gait (walking locomotion);increase endurance/gait distance  -ML     [place] Level (Gait Training Goal 1, PT) independent  -ML     Distance (Gait Training Goal 1, PT) 200  -ML     Time Frame (Gait Training Goal 1, PT) 10 days  -ML       Row Name 09/17/23 1509          Stairs Goal 1 (PT)    Activity/Assistive Device (Stairs Goal 1, PT) ascending stairs;descending stairs  -ML     [place] Level/Cues Needed (Stairs Goal 1, PT) modified independence  -ML     Number of Stairs (Stairs Goal 1, PT) 13  -ML     Time Frame (Stairs Goal 1, PT) 10 days  -ML       Row Name 09/17/23 1500          Therapy Assessment/Plan (PT)    Planned Therapy Interventions (PT) balance training;bed mobility training;gait training;home exercise program;neuromuscular re-education;patient/family education;postural re-education;ROM (range of motion);stair training;strengthening;stretching;transfer training  -ML               User Key  (r) = Recorded By, (t) = Taken By, (c) = Cosigned By      Initials Name Provider Type     Tika Sandoval Physical Therapist                   Clinical Impression       Row Name 09/17/23 1506          Pain    Pretreatment Pain Rating 7/10  -ML     Posttreatment Pain Rating 7/10  -ML     Pain Location generalized  -ML     Pain Location - head  -ML     Pain Intervention(s) Repositioned;Ambulation/increased activity;Rest  -ML       Row Name 09/17/23 1503          Plan of Care Review    Plan of Care Reviewed With patient  -ML     Outcome Evaluation Physical therapy evaluation complete. The patient with oxygen desaturaiton to 78% during ambulation while on 6 LPM, with seated rest break patient  recovered to 87%. Patient presents below baseline for mobility and would continue to benefit from skilled PT to address activity tolerance deficits. At hospital discharge recommend patient return home with assist.  -ML       Row Name 09/17/23 1506          Therapy Assessment/Plan (PT)    Rehab Potential (PT) good, to achieve stated therapy goals  -     Criteria for Skilled Interventions Met (PT) yes;meets criteria;skilled treatment is necessary  -ML     Therapy Frequency (PT) daily  -ML       Row Name 09/17/23 1506          Vital Signs    Pre SpO2 (%) 88  -ML     O2 Delivery Pre Treatment nasal cannula  -ML     Intra SpO2 (%) 78   -ML     O2 Delivery Intra Treatment nasal cannula  -ML     Post SpO2 (%) 87  -ML     O2 Delivery Post Treatment nasal cannula  -ML     Pre Patient Position Supine  -ML     Intra Patient Position Standing  -ML     Post Patient Position Sitting  -ML       Row Name 09/17/23 1506          Positioning and Restraints    Pre-Treatment Position in bed  -ML     Post Treatment Position chair  -ML     In Chair notified nsg;reclined;call light within reach;encouraged to call for assist;exit alarm on;waffle cushion;legs elevated  -ML               User Key  (r) = Recorded By, (t) = Taken By, (c) = Cosigned By      Initials Name Provider Type     Tika Sandoval Physical Therapist                   Outcome Measures       Row Name 09/17/23 1510          How much help from another person do you currently need...    Turning from your back to your side while in flat bed without using bedrails? 4  -ML     Moving from lying on back to sitting on the side of a flat bed without bedrails? 4  -ML     Moving to and from a bed to a chair (including a wheelchair)? 4  -ML     Standing up from a chair using your arms (e.g., wheelchair, bedside chair)? 4  -ML     Climbing 3-5 steps with a railing? 3  -ML     To walk in hospital room? 3  -ML     AM-PAC 6 Clicks Score (PT) 22  -ML     Highest level of mobility 7 -->  Walked 25 feet or more  -       Row Name 09/17/23 1510          Functional Assessment    Outcome Measure Options AM-PAC 6 Clicks Basic Mobility (PT)  -               User Key  (r) = Recorded By, (t) = Taken By, (c) = Cosigned By      Initials Name Provider Type     Tika Sandoval Physical Therapist                                 Physical Therapy Education       Title: PT OT SLP Therapies (In Progress)       Topic: Physical Therapy (In Progress)       Point: Mobility training (Done)       Learning Progress Summary             Patient Acceptance, E, VU,NR by  at 9/17/2023 1510    Comment: home oxygen management, pursed lip breathing, energy conservation techniques                         Point: Home exercise program (Not Started)       Learner Progress:  Not documented in this visit.              Point: Body mechanics (Not Started)       Learner Progress:  Not documented in this visit.              Point: Precautions (Done)       Learning Progress Summary             Patient Acceptance, E, VU,NR by  at 9/17/2023 1510    Comment: home oxygen management, pursed lip breathing, energy conservation techniques                                         User Key       Initials Effective Dates Name Provider Type Discipline     04/22/21 -  Tika Sandoval Physical Therapist PT                  PT Recommendation and Plan  Planned Therapy Interventions (PT): balance training, bed mobility training, gait training, home exercise program, neuromuscular re-education, patient/family education, postural re-education, ROM (range of motion), stair training, strengthening, stretching, transfer training  Plan of Care Reviewed With: patient  Outcome Evaluation: Physical therapy evaluation complete. The patient with oxygen desaturaiton to 78% during ambulation while on 6 LPM, with seated rest break patient recovered to 87%. Patient presents below baseline for mobility and would continue to benefit from skilled PT to address activity  tolerance deficits. At hospital discharge recommend patient return home with assist.     Time Calculation:   PT Evaluation Complexity  History, PT Evaluation Complexity: 1-2 personal factors and/or comorbidities  Examination of Body Systems (PT Eval Complexity): total of 3 or more elements  Clinical Presentation (PT Evaluation Complexity): evolving  Clinical Decision Making (PT Evaluation Complexity): moderate complexity  Overall Complexity (PT Evaluation Complexity): moderate complexity     PT Charges       Row Name 09/17/23 1511             Time Calculation    Start Time 1428  -ML      PT Received On 09/17/23  -ML      PT Goal Re-Cert Due Date 09/27/23  -ML         Timed Charges    56599 - PT Therapeutic Activity Minutes 15  -ML         Untimed Charges    PT Eval/Re-eval Minutes 31  -ML         Total Minutes    Timed Charges Total Minutes 15  -ML      Untimed Charges Total Minutes 31  -ML       Total Minutes 46  -ML                User Key  (r) = Recorded By, (t) = Taken By, (c) = Cosigned By      Initials Name Provider Type    Tika Greenwood Physical Therapist                  Therapy Charges for Today       Code Description Service Date Service Provider Modifiers Qty    42156589681 HC PT THERAPEUTIC ACT EA 15 MIN 9/17/2023 Tika Sandoval GP 1    70279068201 HC PT EVAL MOD COMPLEXITY 3 9/17/2023 Tika Sandoval GP 1            PT G-Codes  Outcome Measure Options: AM-PAC 6 Clicks Basic Mobility (PT)  AM-PAC 6 Clicks Score (PT): 22  PT Discharge Summary  Anticipated Discharge Disposition (PT): home with assist    Tika Sandoval  9/17/2023

## 2023-09-17 NOTE — PLAN OF CARE
Problem: Adult Inpatient Plan of Care  Goal: Optimal Comfort and Wellbeing  Outcome: Ongoing, Not Progressing  Intervention: Provide Person-Centered Care  Recent Flowsheet Documentation  Taken 9/17/2023 0226 by Araseli Gill RN  Trust Relationship/Rapport:   care explained   choices provided   emotional support provided   empathic listening provided   questions answered   questions encouraged   reassurance provided   thoughts/feelings acknowledged     Problem: COPD (Chronic Obstructive Pulmonary Disease) Comorbidity  Goal: Maintenance of COPD Symptom Control  Outcome: Ongoing, Not Progressing  Intervention: Maintain COPD-Symptom Control  Recent Flowsheet Documentation  Taken 9/17/2023 0226 by Araseli Gill RN  Supportive Measures:   active listening utilized   self-care encouraged   self-reflection promoted   self-responsibility promoted   verbalization of feelings encouraged   Goal Outcome Evaluation:  Plan of Care Reviewed With: patient        Progress: no change  Outcome Evaluation: Pt came from the ED with COPD acute exacerbation. 88-90% on 6L NC/ AOx4/VSS/NSR. Pt. fell over O2 tank at home and may have hit her chest during her fall was unconcious several hours. Pt. denies headache, pain or SOA, except with movement to bathroom. Continue monitoring.

## 2023-09-17 NOTE — PROGRESS NOTES
[facility]  ADMISSION FOLLOW-UP NOTE          Patient admitted after midnight, H&P by my partner performed earlier on today's date reviewed.  Interim findings, labs, and charting also reviewed.        The [facility] Problem List has been managed and updated to include any new diagnoses:  Active Hospital Problems    Diagnosis  POA    **COPD with acute exacerbation [J44.1]  Yes    COPD (chronic obstructive pulmonary disease) [J44.9]  Yes    Hypokalemia [E87.6]  Yes    Elevated troponin [R77.8]  Yes    Syncope [R55]  Yes    Severe malnutrition [E43]  Yes    COPD exacerbation [J44.1]  Yes    Acute on chronic respiratory failure with hypoxia and hypercapnia [J96.21, J96.22]  Yes    Chronic Pancreatitis [K85.90]  Yes      Resolved Hospital Problems   No resolved problems to display.         ADDITIONAL PLAN:  - detailed assessment and plan from admission reviewed    63 y.o. female with PMH significant for COPD with chronic respiratory failure on 4 LNC, chronic pancreatitis, malnutrition, who presents to the ED with complaint of shortness of breath and syncope who was found to have concern for COPD with acute exacerbation.     Acute on chronic respiratory failure with hypercapnia and hypoxia  COPD exacerbation  - Baseline supplemental oxygen 4L NC(per her report, will have CM clarify), currently on 6 LNC, wean as tolerated   - Scheduled and as needed DuoNebs  - Continue Symbicort/Spiriva   - Continue Solu-Medrol 40 mg 3 times daily for now with plan to taper as appropriate  - COPD navigator  - Consider pulmonary consult tomorrow pending progress. Does not currently follow with Pulm outpatient   - Continue Doxycycline  - Will need a nebulizer machine for home     Syncope  - Orthostatic BP negative   - Likely related to hypoxia  - Echo pending  - Fall precautions  - CT head pending     Elevated troponin  Elevated proBNP  - Troponin trending down  - Denies chest pain  - Echo  pending  - Does not appear volume overloaded, stable pleural effusions noted on CTA chest     Hypokalemia  - Electrolyte replacement     Chronic pancreatitis  - Continue Creon     Severe malnutrition  - Nutritional consult      Tobacco abuse  - Reports longer smoking, but now using vapes  - Encourage cessation    Expected Discharge   Expected Discharge Date: 9/20/2023; Expected Discharge Time:      Monica Gupta DO  09/17/23

## 2023-09-17 NOTE — PLAN OF CARE
Goal Outcome Evaluation:   Patient is alert, oriented and pleasant. VSS. NSR on tele. 6lpm via NC, oxygen desaturation with exertion/ambulation. Patient ambulated to restroom. Patient c/o headache, initially gave Tylenol, then patient verbalized that she feels HA is due to not having her Percocet, new order from MD for prn Percocet, dose administered. Education about not exceeding 3g acetaminophen in 24 hours. Patient's plan of care is ongoing. Bed in lowest position and call light within reach.

## 2023-09-17 NOTE — PLAN OF CARE
Goal Outcome Evaluation:  Plan of Care Reviewed With: patient           Outcome Evaluation: Physical therapy evaluation complete. The patient with oxygen desaturation to 78% during ambulation while on 6 LPM, with seated rest break patient recovered to 87%. Patient presents below baseline for mobility and would continue to benefit from skilled PT to address activity tolerance deficits. At hospital discharge recommend patient return home with assist.      Anticipated Discharge Disposition (PT): home with assist

## 2023-09-17 NOTE — ED PROVIDER NOTES
"Subjective   History of Present Illness  This is a 63-year-old female that presents the ER with worsening shortness of breath x1 week.  Patient has chronic shortness of breath with advanced emphysema.  She has smoked half pack to 1 pack/day for at least 50 years.  She started smoking at the age of 13.  She does not follow with pulmonology routinely.  She says that her PCP through [facility], [facility], is trying to set her up with pulmonology.  She was admitted to our facility on 7/17/2023 through 7/23/2023 for COPD exacerbation, severe malnutrition, hypoglycemia, and left lower lobe community-acquired pneumonia.  She presented to the ER with worsening shortness of breath, continued tobacco dependence, and said that her home oxygen tank \"ran dry\".  During last admission, patient initially required up to 6 L but improved back to her baseline at 2 L per nasal cannula.  CTA of the chest revealed left lower lobe pneumonia.  She was treated with ceftriaxone and doxycycline and given steroids and nebulizer treatments.  Patient says that she feels similar to the last admission.  She stopped smoking 2 weeks ago when she was initiated on a Trilegy inhaler.  She says that she felt great for the first 3 to 4 days of using this inhaler.  She then started having some nicotine urges and then started vaping.  Patient said she has had increased shortness of breath x1 week.  She reports chest tightness and wheezing.  She went through 3 oxygen tanks at home and then ran out.  She came straight to the ER this evening due to shortness of breath.  O2 sat upon arrival was 77% on room air.    History provided by:  Patient  Shortness of Breath  Severity:  Severe  Onset quality:  Gradual  Duration:  1 week  Timing:  Constant  Progression:  Worsening  Chronicity:  Chronic  Context comment:  Patient has history of advanced emphysema.  She quit smoking 2 weeks ago and started vaping.  She uses 2 L of O2 continuously.  Worsening shortness " of breath x1 week, but she ran out of O2 in tanks today.  O2 sat 77% on arrival.  Relieved by:  Nothing  Worsened by:  Nothing  Ineffective treatments: Trilegy inhaler, O2 per 2L NC continuously.  Associated symptoms: cough (Mild, chronic nonproductive cough) and wheezing    Associated symptoms: no abdominal pain, no chest pain, no claudication, no diaphoresis, no ear pain, no fever, no headaches, no hemoptysis, no neck pain, no PND, no rash, no sore throat, no sputum production, no syncope, no swollen glands and no vomiting    Risk factors: tobacco use (Patient has smoked at least half pack per day since the age of 13.  She quit smoking 2 weeks ago and started vaping nicotine products.)      Review of Systems   Constitutional:  Positive for activity change, appetite change and fatigue. Negative for diaphoresis and fever.   HENT: Negative.  Negative for congestion, ear pain, postnasal drip, rhinorrhea, sinus pressure, sinus pain, sneezing and sore throat.    Respiratory:  Positive for cough (Mild, chronic nonproductive cough), chest tightness, shortness of breath and wheezing. Negative for hemoptysis and sputum production.         Longstanding tobacco dependence.  Last cigarette was this morning.  Patient has smoked half pack to 1 pack/day since the age of 13.  History of advanced emphysema.  She utilizes O2 at 2 L per nasal cannula continuously at home.   Cardiovascular: Negative.  Negative for chest pain, claudication, syncope and PND.   Gastrointestinal: Negative.  Negative for abdominal pain and vomiting.   Genitourinary: Negative.    Musculoskeletal: Negative.  Negative for back pain and neck pain.   Skin:  Negative for rash.   Neurological: Negative.  Negative for dizziness, syncope and headaches.   All other systems reviewed and are negative.    Past Medical History:   Diagnosis Date    Alcohol abuse     Quit early 2000's    COPD (chronic obstructive pulmonary disease)     Pancreatitis        Allergies    Allergen Reactions    Green Pepper Swelling     Throat and face    Mushroom Swelling     Throat and face    Methadone Swelling       No past surgical history on file.    Family History   Problem Relation Age of Onset    Heart failure Mother     Stroke Mother     Stroke Father        Social History     Socioeconomic History    Marital status:    Tobacco Use    Smoking status: Every Day     Packs/day: 0.50     Years: 40.00     Pack years: 20.00     Types: Cigarettes    Smokeless tobacco: Never   Vaping Use    Vaping Use: Never used   Substance and Sexual Activity    Alcohol use: Never    Drug use: Never    Sexual activity: Defer           Objective   Physical Exam  Vitals and nursing note reviewed.   Constitutional:       General: She is not in acute distress.     Appearance: Normal appearance. She is not ill-appearing, toxic-appearing or diaphoretic.      Comments: Thin built, cachectic female.  No acute distress.  Nontoxic.   HENT:      Head: Normocephalic and atraumatic.      Nose: Nose normal.      Mouth/Throat:      Mouth: Mucous membranes are moist.      Pharynx: Oropharynx is clear.   Eyes:      Extraocular Movements: Extraocular movements intact.      Conjunctiva/sclera: Conjunctivae normal.      Pupils: Pupils are equal, round, and reactive to light.   Cardiovascular:      Rate and Rhythm: Normal rate and regular rhythm. No extrasystoles are present.     Pulses: Normal pulses.      Heart sounds: Normal heart sounds.      Comments: Regular rate and rhythm.  No ectopy.  No pedal edema to lower extremities.  Pulmonary:      Effort: Pulmonary effort is normal. Tachypnea present. No accessory muscle usage or retractions.      Breath sounds: Decreased breath sounds present. No wheezing, rhonchi or rales.      Comments: Tachypnea with conversation.  O2 per nasal cannula in place.  No retractions or accessory muscle use.  Globally diminished breath sounds secondary to COPD.  No pleuritic type chest pain  with deep inspiration.  No wheezes or rhonchi.  No respiratory distress.  Abdominal:      General: Bowel sounds are normal. There is no distension.      Palpations: Abdomen is soft.      Tenderness: There is no abdominal tenderness. There is no right CVA tenderness, left CVA tenderness, guarding or rebound.      Comments: Abdomen soft and nontender.  No flank or CVA tenderness.   Musculoskeletal:         General: Normal range of motion.      Cervical back: Normal range of motion and neck supple.      Right lower leg: No edema.      Left lower leg: No edema.   Skin:     General: Skin is warm and dry.   Neurological:      General: No focal deficit present.      Mental Status: She is alert.       Procedures           ED Course  ED Course as of 09/17/23 0130   Sun Sep 17, 2023   0125 EKGs x2 show sinus rhythm.  Initial EKG showed PVCs, but 2 are EKG showed sinus rhythm without ectopy or arrhythmia.  No acute ST-T wave changes consistent with ischemia.  CBC and chemistries were essentially normal.  ABG showed pH 7.43, PCO2 45, PO2 71, bicarb is 30.  Respiratory PCR panel was completely negative.  BNP is 7481.  D-dimer was elevated at 1.11.  Lactic acid is 2.0.  LDH is 244.  CRP is negative.  High-sensitivity troponin was 24 and repeat 2-hour high-sensitivity troponin was 20.  Patient given IV Solu-Medrol and DuoNeb treatment.  She felt much improved.  Patient was hypoxic, but she appears comfortable on 6 L per nasal cannula.  She is not having any retractions or accessory muscle use.  Chest x-ray reveals improved left midlung airspace disease.  Stable small bilateral pleural effusions and stable chronic changes.  Heart score was 2.  We proceeded with CTA of the chest with contrast.  There was no evidence of pulmonary embolism.  Stable small bilateral pleural effusions, slightly larger on the right.  Stable severe emphysema.  Stable enlargement of the right ventricle and mild coronary artery calcifications.  Patient has  no home oxygen supplementation at this time.  She also does not have a nebulizer machine.  Recommend case management consult to help set up some resources for patient and we also recommend hospitalist to consult pulmonology to establish care for long-term management of emphysema.  Recommend aggressive pulmonary toilet.  Discussed admission with Dr. Savage, hospitalist, and he is agreeable to admission on telemetry.  I updated patient on all results and need for admission and she is appreciative and agreeable. [FC]      ED Course User Index  [FC] Myesha Dick PA-C           Recent Results (from the past 24 hour(s))   Comprehensive Metabolic Panel    Collection Time: 09/16/23  8:10 PM    Specimen: Blood   Result Value Ref Range    Glucose 93 65 - 99 mg/dL    BUN 13 8 - 23 mg/dL    Creatinine 0.55 (L) 0.57 - 1.00 mg/dL    Sodium 146 (H) 136 - 145 mmol/L    Potassium 3.3 (L) 3.5 - 5.2 mmol/L    Chloride 106 98 - 107 mmol/L    CO2 30.0 (H) 22.0 - 29.0 mmol/L    Calcium 9.2 8.6 - 10.5 mg/dL    Total Protein 6.3 6.0 - 8.5 g/dL    Albumin 4.1 3.5 - 5.2 g/dL    ALT (SGPT) 10 1 - 33 U/L    AST (SGOT) 22 1 - 32 U/L    Alkaline Phosphatase 82 39 - 117 U/L    Total Bilirubin 0.6 0.0 - 1.2 mg/dL    Globulin 2.2 gm/dL    A/G Ratio 1.9 g/dL    BUN/Creatinine Ratio 23.6 7.0 - 25.0    Anion Gap 10.0 5.0 - 15.0 mmol/L    eGFR 103.1 >60.0 mL/min/1.73   Lactate Dehydrogenase    Collection Time: 09/16/23  8:10 PM    Specimen: Blood   Result Value Ref Range     (H) 135 - 214 U/L   Procalcitonin    Collection Time: 09/16/23  8:10 PM    Specimen: Blood   Result Value Ref Range    Procalcitonin 0.04 0.00 - 0.25 ng/mL   C-reactive Protein    Collection Time: 09/16/23  8:10 PM    Specimen: Blood   Result Value Ref Range    C-Reactive Protein <0.30 0.00 - 0.50 mg/dL   Lactic Acid, Plasma    Collection Time: 09/16/23  8:10 PM    Specimen: Blood   Result Value Ref Range    Lactate 2.0 0.5 - 2.0 mmol/L   Single High Sensitivity  Troponin T    Collection Time: 09/16/23  8:10 PM    Specimen: Blood   Result Value Ref Range    HS Troponin T 24 (H) <10 ng/L   BNP    Collection Time: 09/16/23  8:10 PM    Specimen: Blood   Result Value Ref Range    proBNP 7,481.0 (H) 0.0 - 900.0 pg/mL   CBC Auto Differential    Collection Time: 09/16/23  8:10 PM    Specimen: Blood   Result Value Ref Range    WBC 8.27 3.40 - 10.80 10*3/mm3    RBC 4.11 3.77 - 5.28 10*6/mm3    Hemoglobin 13.6 12.0 - 15.9 g/dL    Hematocrit 42.6 34.0 - 46.6 %    .6 (H) 79.0 - 97.0 fL    MCH 33.1 (H) 26.6 - 33.0 pg    MCHC 31.9 31.5 - 35.7 g/dL    RDW 13.8 12.3 - 15.4 %    RDW-SD 53.5 37.0 - 54.0 fl    MPV 10.3 6.0 - 12.0 fL    Platelets 232 140 - 450 10*3/mm3    Neutrophil % 66.2 42.7 - 76.0 %    Lymphocyte % 25.4 19.6 - 45.3 %    Monocyte % 6.2 5.0 - 12.0 %    Eosinophil % 1.2 0.3 - 6.2 %    Basophil % 0.6 0.0 - 1.5 %    Immature Grans % 0.4 0.0 - 0.5 %    Neutrophils, Absolute 5.48 1.70 - 7.00 10*3/mm3    Lymphocytes, Absolute 2.10 0.70 - 3.10 10*3/mm3    Monocytes, Absolute 0.51 0.10 - 0.90 10*3/mm3    Eosinophils, Absolute 0.10 0.00 - 0.40 10*3/mm3    Basophils, Absolute 0.05 0.00 - 0.20 10*3/mm3    Immature Grans, Absolute 0.03 0.00 - 0.05 10*3/mm3    nRBC 0.0 0.0 - 0.2 /100 WBC   D-dimer, Quantitative    Collection Time: 09/16/23  8:10 PM    Specimen: Blood   Result Value Ref Range    D-Dimer, Quantitative 1.11 (H) 0.00 - 0.63 MCGFEU/mL   ECG 12 Lead Dyspnea    Collection Time: 09/16/23  8:14 PM   Result Value Ref Range    QT Interval 428 ms    QTC Interval 477 ms   Respiratory Panel PCR w/COVID-19(SARS-CoV-2) JONAS/RINA/NAFISA/PAD/COR/MAD/SALOME In-House, NP Swab in [facility], 3-4 HR TAT - Swab, Nasopharynx    Collection Time: 09/16/23  8:21 PM    Specimen: Nasopharynx; Swab   Result Value Ref Range    ADENOVIRUS, PCR Not Detected Not Detected    Coronavirus 229E Not Detected Not Detected    Coronavirus HKU1 Not Detected Not Detected    Coronavirus NL63 Not Detected Not Detected     Coronavirus OC43 Not Detected Not Detected    COVID19 Not Detected Not Detected - Ref. Range    Human Metapneumovirus Not Detected Not Detected    Human Rhinovirus/Enterovirus Not Detected Not Detected    Influenza A PCR Not Detected Not Detected    Influenza B PCR Not Detected Not Detected    Parainfluenza Virus 1 Not Detected Not Detected    Parainfluenza Virus 2 Not Detected Not Detected    Parainfluenza Virus 3 Not Detected Not Detected    Parainfluenza Virus 4 Not Detected Not Detected    RSV, PCR Not Detected Not Detected    Bordetella pertussis pcr Not Detected Not Detected    Bordetella parapertussis PCR Not Detected Not Detected    Chlamydophila pneumoniae PCR Not Detected Not Detected    Mycoplasma pneumo by PCR Not Detected Not Detected   Blood Gas, Arterial With Co-Ox    Collection Time: 09/16/23  8:39 PM    Specimen: Arterial Blood   Result Value Ref Range    Site Right Brachial     Collin's Test N/A     pH, Arterial 7.435 7.350 - 7.450 pH units    pCO2, Arterial 45.5 (H) 35.0 - 45.0 mm Hg    pO2, Arterial 71.4 (L) 83.0 - 108.0 mm Hg    HCO3, Arterial 30.6 (H) 20.0 - 26.0 mmol/L    Base Excess, Arterial 5.5 (H) 0.0 - 2.0 mmol/L    Hemoglobin, Blood Gas 13.1 (L) 14 - 18 g/dL    Hematocrit, Blood Gas 40.0 38.0 - 51.0 %    Oxyhemoglobin 90.9 (L) 94 - 99 %    Methemoglobin 0.10 0.00 - 1.50 %    Carboxyhemoglobin 3.9 (H) 0 - 2 %    CO2 Content 31.9 22 - 33 mmol/L    Temperature 37.0 C    Barometric Pressure for Blood Gas      Modality Nasal Cannula     FIO2 44 %    Rate 0 Breaths/minute    PIP 0 cmH2O    IPAP 0     EPAP 0     pH, Temp Corrected 7.435 pH Units    pCO2, Temperature Corrected 45.5 (H) 35 - 45 mm Hg    pO2, Temperature Corrected 71.4 (L) 83 - 108 mm Hg   ECG 12 Lead Dyspnea    Collection Time: 09/16/23 11:17 PM   Result Value Ref Range    QT Interval 436 ms    QTC Interval 473 ms   Single High Sensitivity Troponin T    Collection Time: 09/17/23 12:29 AM    Specimen: Blood   Result Value Ref Range     HS Troponin T 20 (H) <10 ng/L     Note: In addition to lab results from this visit, the labs listed above may include labs taken at another facility or during a different encounter within the last 24 hours. Please correlate lab times with ED admission and discharge times for further clarification of the services performed during this visit.    CT Angiogram Chest   Final Result   Impression:   1.No evidence of pulmonary embolus.   2.Stable small bilateral pleural effusions, slightly larger on the right.   3.Stable severe emphysema.   4.Stable enlargement of the right ventricle and mild coronary artery calcification.            Electronically Signed: Bishop Anand MD     9/16/2023 11:52 PM EDT     Workstation ID: [id number]      XR Chest 1 View   Final Result   Impression:   1.Improved left midlung airspace disease.   2.Stable small bilateral pleural effusions.   3.Stable chronic changes in the lungs.            Electronically Signed: Bishop Anand MD     9/16/2023 9:17 PM EDT     Workstation ID: [id number]        Vitals:    09/16/23 2318 09/16/23 2320 09/17/23 0000 09/17/23 0020   BP:  152/88 157/87 153/90   Pulse:  74 71 70   Resp:       Temp:       TempSrc:       SpO2: 96%  91% 90%   Weight:       Height:         Medications   sodium chloride 0.9 % flush 10 mL (has no administration in time range)   methylPREDNISolone sodium succinate (SOLU-Medrol) injection 80 mg (80 mg Intravenous Given 9/16/23 2153)   ipratropium-albuterol (DUO-NEB) nebulizer solution 3 mL (3 mL Nebulization Given 9/16/23 2041)   iopamidol (ISOVUE-370) 76 % injection 100 mL (75 mL Intravenous Given 9/16/23 2333)     ECG/EMG Results (last 24 hours)       Procedure Component Value Units Date/Time    ECG 12 Lead Dyspnea [[id number]] Collected: 09/16/23 2014     Updated: 09/16/23 2013     QT Interval 428 ms      QTC Interval 477 ms     Narrative:      Test Reason : Dyspnea  Blood Pressure :   */*   mmHG  Vent. Rate :  75 BPM     Atrial Rate :  75  BPM     P-R Int : 112 ms          QRS Dur :  98 ms      QT Int : 428 ms       P-R-T Axes :  66  91  76 degrees     QTc Int : 477 ms    Sinus rhythm with premature supraventricular complexes  Possible Left atrial enlargement  Rightward axis  Pulmonary disease pattern  Incomplete right bundle branch block  Abnormal ECG  When compared with ECG of 17-JUL-2023 15:27,  premature supraventricular complexes are now present  Nonspecific T wave abnormality no longer evident in Inferior leads    Referred By:            Confirmed By:           ECG 12 Lead Dyspnea   Preliminary Result   Test Reason : Dyspnea   Blood Pressure :   */*   mmHG   Vent. Rate :  71 BPM     Atrial Rate :  71 BPM      P-R Int : 120 ms          QRS Dur :  92 ms       QT Int : 436 ms       P-R-T Axes :  63  98  52 degrees      QTc Int : 473 ms      Normal sinus rhythm   Possible Left atrial enlargement   Rightward axis   Incomplete right bundle branch block   Septal infarct , age undetermined   Abnormal ECG   When compared with ECG of 16-SEP-2023 20:14, (Unconfirmed)   premature supraventricular complexes are no longer present   Septal infarct is now present   Nonspecific T wave abnormality now evident in Inferior leads      Referred By:            Confirmed By:       ECG 12 Lead Dyspnea   Preliminary Result   Test Reason : Dyspnea   Blood Pressure :   */*   mmHG   Vent. Rate :  75 BPM     Atrial Rate :  75 BPM      P-R Int : 112 ms          QRS Dur :  98 ms       QT Int : 428 ms       P-R-T Axes :  66  91  76 degrees      QTc Int : 477 ms      Sinus rhythm with premature supraventricular complexes   Possible Left atrial enlargement   Rightward axis   Pulmonary disease pattern   Incomplete right bundle branch block   Abnormal ECG   When compared with ECG of 17-JUL-2023 15:27,   premature supraventricular complexes are now present   Nonspecific T wave abnormality no longer evident in Inferior leads      Referred By:            Confirmed By:                 PERC Rule for Pulmonary Embolism - MDCalc  Calculated on Sep 17 2023 1:25 AM  2 criteria -> If any criteria are positive, the PERC rule cannot be used to rule out PE in this patient.                           Medical Decision Making  Amount and/or Complexity of Data Reviewed  Labs: ordered.  Radiology: ordered.  ECG/medicine tests: ordered.    Risk  Prescription drug management.  Decision regarding hospitalization.        Final diagnoses:   Acute on chronic respiratory failure with hypoxia   Acute exacerbation of chronic obstructive pulmonary disease (COPD)   Bilateral pleural effusion   Hypoxia   Tobacco dependence   Malnutrition, unspecified type       ED Disposition  ED Disposition       ED Disposition   Decision to Admit    Condition   --    Comment   Level of Care: Telemetry [5]   Diagnosis: COPD (chronic obstructive pulmonary disease) [909256]   Certification: I Certify That Inpatient Hospital Services Are Medically Necessary For Greater Than 2 Midnights                 No follow-up provider specified.       Medication List      No changes were made to your prescriptions during this visit.            Myesha Dick PA-C  09/17/23 0130

## 2023-09-18 LAB
ANION GAP SERPL CALCULATED.3IONS-SCNC: 7 MMOL/L (ref 5–15)
BUN SERPL-MCNC: 20 MG/DL (ref 8–23)
BUN/CREAT SERPL: 43.5 (ref 7–25)
CALCIUM SPEC-SCNC: 8.9 MG/DL (ref 8.6–10.5)
CHLORIDE SERPL-SCNC: 106 MMOL/L (ref 98–107)
CO2 SERPL-SCNC: 29 MMOL/L (ref 22–29)
CREAT SERPL-MCNC: 0.46 MG/DL (ref 0.57–1)
DEPRECATED RDW RBC AUTO: 52.6 FL (ref 37–54)
EGFRCR SERPLBLD CKD-EPI 2021: 107.7 ML/MIN/1.73
ERYTHROCYTE [DISTWIDTH] IN BLOOD BY AUTOMATED COUNT: 13.9 % (ref 12.3–15.4)
FOLATE SERPL-MCNC: 11.7 NG/ML (ref 4.78–24.2)
GLUCOSE SERPL-MCNC: 170 MG/DL (ref 65–99)
HCT VFR BLD AUTO: 42.2 % (ref 34–46.6)
HGB BLD-MCNC: 13.7 G/DL (ref 12–15.9)
MCH RBC QN AUTO: 33.6 PG (ref 26.6–33)
MCHC RBC AUTO-ENTMCNC: 32.5 G/DL (ref 31.5–35.7)
MCV RBC AUTO: 103.4 FL (ref 79–97)
PLATELET # BLD AUTO: 215 10*3/MM3 (ref 140–450)
PMV BLD AUTO: 10.5 FL (ref 6–12)
POTASSIUM SERPL-SCNC: 5.2 MMOL/L (ref 3.5–5.2)
RBC # BLD AUTO: 4.08 10*6/MM3 (ref 3.77–5.28)
SODIUM SERPL-SCNC: 142 MMOL/L (ref 136–145)
VIT B12 BLD-MCNC: 677 PG/ML (ref 211–946)
WBC NRBC COR # BLD: 9.97 10*3/MM3 (ref 3.4–10.8)

## 2023-09-18 PROCEDURE — 85027 COMPLETE CBC AUTOMATED: CPT | Performed by: FAMILY MEDICINE

## 2023-09-18 PROCEDURE — 97110 THERAPEUTIC EXERCISES: CPT

## 2023-09-18 PROCEDURE — 94664 DEMO&/EVAL PT USE INHALER: CPT

## 2023-09-18 PROCEDURE — 94799 UNLISTED PULMONARY SVC/PX: CPT

## 2023-09-18 PROCEDURE — 25010000002 METHYLPREDNISOLONE PER 40 MG: Performed by: NURSE PRACTITIONER

## 2023-09-18 PROCEDURE — 99222 1ST HOSP IP/OBS MODERATE 55: CPT | Performed by: INTERNAL MEDICINE

## 2023-09-18 PROCEDURE — 97165 OT EVAL LOW COMPLEX 30 MIN: CPT

## 2023-09-18 PROCEDURE — 25010000002 ENOXAPARIN PER 10 MG: Performed by: NURSE PRACTITIONER

## 2023-09-18 PROCEDURE — 82746 ASSAY OF FOLIC ACID SERUM: CPT | Performed by: FAMILY MEDICINE

## 2023-09-18 PROCEDURE — 94761 N-INVAS EAR/PLS OXIMETRY MLT: CPT

## 2023-09-18 PROCEDURE — 25010000002 FUROSEMIDE PER 20 MG: Performed by: INTERNAL MEDICINE

## 2023-09-18 PROCEDURE — 80048 BASIC METABOLIC PNL TOTAL CA: CPT | Performed by: FAMILY MEDICINE

## 2023-09-18 PROCEDURE — 82607 VITAMIN B-12: CPT | Performed by: FAMILY MEDICINE

## 2023-09-18 RX ORDER — FUROSEMIDE 10 MG/ML
40 INJECTION INTRAMUSCULAR; INTRAVENOUS EVERY 12 HOURS SCHEDULED
Status: COMPLETED | OUTPATIENT
Start: 2023-09-18 | End: 2023-09-18

## 2023-09-18 RX ADMIN — BUDESONIDE AND FORMOTEROL FUMARATE DIHYDRATE 2 PUFF: 160; 4.5 AEROSOL RESPIRATORY (INHALATION) at 20:20

## 2023-09-18 RX ADMIN — ALBUTEROL SULFATE 2.5 MG: 2.5 SOLUTION RESPIRATORY (INHALATION) at 07:24

## 2023-09-18 RX ADMIN — FUROSEMIDE 40 MG: 10 INJECTION, SOLUTION INTRAMUSCULAR; INTRAVENOUS at 20:03

## 2023-09-18 RX ADMIN — FUROSEMIDE 40 MG: 10 INJECTION, SOLUTION INTRAMUSCULAR; INTRAVENOUS at 09:01

## 2023-09-18 RX ADMIN — PANCRELIPASE 36000 UNITS OF LIPASE: 36000; 180000; 114000 CAPSULE, DELAYED RELEASE PELLETS ORAL at 09:01

## 2023-09-18 RX ADMIN — DOXYCYCLINE 100 MG: 100 CAPSULE ORAL at 20:03

## 2023-09-18 RX ADMIN — PANCRELIPASE 36000 UNITS OF LIPASE: 36000; 180000; 114000 CAPSULE, DELAYED RELEASE PELLETS ORAL at 13:16

## 2023-09-18 RX ADMIN — TIOTROPIUM BROMIDE INHALATION SPRAY 2 PUFF: 3.12 SPRAY, METERED RESPIRATORY (INHALATION) at 07:26

## 2023-09-18 RX ADMIN — ALBUTEROL SULFATE 2.5 MG: 2.5 SOLUTION RESPIRATORY (INHALATION) at 20:20

## 2023-09-18 RX ADMIN — SENNOSIDES AND DOCUSATE SODIUM 2 TABLET: 8.6; 5 TABLET ORAL at 20:03

## 2023-09-18 RX ADMIN — ALBUTEROL SULFATE 2.5 MG: 2.5 SOLUTION RESPIRATORY (INHALATION) at 15:28

## 2023-09-18 RX ADMIN — OXYCODONE HYDROCHLORIDE AND ACETAMINOPHEN 1 TABLET: 10; 325 TABLET ORAL at 20:10

## 2023-09-18 RX ADMIN — MULTIPLE VITAMINS W/ MINERALS TAB 1 TABLET: TAB at 09:02

## 2023-09-18 RX ADMIN — METHYLPREDNISOLONE SODIUM SUCCINATE 40 MG: 40 INJECTION, POWDER, LYOPHILIZED, FOR SOLUTION INTRAMUSCULAR; INTRAVENOUS at 16:11

## 2023-09-18 RX ADMIN — ENOXAPARIN SODIUM 30 MG: 30 INJECTION SUBCUTANEOUS at 09:01

## 2023-09-18 RX ADMIN — DOXYCYCLINE 100 MG: 100 CAPSULE ORAL at 09:03

## 2023-09-18 RX ADMIN — BUDESONIDE AND FORMOTEROL FUMARATE DIHYDRATE 2 PUFF: 160; 4.5 AEROSOL RESPIRATORY (INHALATION) at 07:26

## 2023-09-18 RX ADMIN — Medication 10 ML: at 20:03

## 2023-09-18 RX ADMIN — ALBUTEROL SULFATE 2.5 MG: 2.5 SOLUTION RESPIRATORY (INHALATION) at 11:27

## 2023-09-18 RX ADMIN — Medication: at 20:10

## 2023-09-18 RX ADMIN — PANCRELIPASE 36000 UNITS OF LIPASE: 36000; 180000; 114000 CAPSULE, DELAYED RELEASE PELLETS ORAL at 17:17

## 2023-09-18 RX ADMIN — METHYLPREDNISOLONE SODIUM SUCCINATE 40 MG: 40 INJECTION, POWDER, LYOPHILIZED, FOR SOLUTION INTRAMUSCULAR; INTRAVENOUS at 09:02

## 2023-09-18 RX ADMIN — Medication 10 ML: at 09:02

## 2023-09-18 NOTE — THERAPY EVALUATION
Patient Name: Mary West  : 1960    MRN: [id number]                              Today's Date: 2023       Admit Date: 2023    Visit Dx:     ICD-10-CM ICD-9-CM   1. Acute on chronic respiratory failure with hypoxia  J96.21 518.84     799.02   2. Acute exacerbation of chronic obstructive pulmonary disease (COPD)  J44.1 491.21   3. Bilateral pleural effusion  J90 511.9   4. Hypoxia  R09.02 799.02   5. Tobacco dependence  F17.200 305.1   6. Malnutrition, unspecified type  E46 263.9     Patient Active Problem List   Diagnosis    COVID-19    COPD with acute exacerbation    Acute on chronic respiratory failure with hypoxia and hypercapnia    Acute respiratory failure due to COVID-19    Remote history alcohol abuse    Tobacco dependence    CAP (community acquired pneumonia)    Chronic Pancreatitis    Chronic narcotic use    Cytokine release syndrome, grade 2    Hypoglycemia    COPD exacerbation    Acute exacerbation of chronic obstructive pulmonary disease (COPD)    Severe malnutrition    COPD (chronic obstructive pulmonary disease)    Hypokalemia    Elevated troponin    Syncope     Past Medical History:   Diagnosis Date    Alcohol abuse     Quit early     COPD (chronic obstructive pulmonary disease)     Pancreatitis      History reviewed. No pertinent surgical history.   General Information       Row Name 23 0813          OT Time and Intention    Document Type evaluation  -HADLEY     Mode of Treatment individual therapy;occupational therapy  -HADLEY       Row Name 23 0813          General Information    Patient Profile Reviewed yes  -HADLEY     Prior Level of Function independent:;all household mobility;community mobility;gait;transfer;bed mobility;feeding;grooming;dressing;bathing;home management;cooking;cleaning;driving;shopping;using stairs;work  -HADLEY     Existing Precautions/Restrictions oxygen therapy device and L/min  pt.'s easily desats with activity  -HADLEY     Barriers to Rehab medically  complex  -       Row Name 09/18/23 0813          Occupational Profile    Environmental Supports and Barriers (Occupational Profile) 13 steps into home, tub shower, low toilet, 02 use at home  -       Row Name 09/18/23 0813          Living Environment    People in Home alone  -       Row Name 09/18/23 0813          Home Main Entrance    Number of Stairs, Main Entrance other (see comments)  13  -HADLEY     Stair Railings, Main Entrance railings on both sides of stairs  -       Row Name 09/18/23 0813          Stairs Within Home, Primary    Number of Stairs, Within Home, Primary none  -HADLEY       Row Name 09/18/23 0813          Cognition    Orientation Status (Cognition) oriented x 4  -       Row Name 09/18/23 0813          Safety Issues, Functional Mobility    Impairments Affecting Function (Mobility) endurance/activity tolerance;shortness of breath;strength  -               User Key  (r) = Recorded By, (t) = Taken By, (c) = Cosigned By      Initials Name Provider Type    Anna Maria, OT Occupational Therapist                     Mobility/ADL's       Row Name 09/18/23 0815          Bed Mobility    Bed Mobility supine-sit;sit-supine  -     Supine-Sit [place] (Bed Mobility) modified independence  -     Sit-Supine [place] (Bed Mobility) modified independence  -     Assistive Device (Bed Mobility) head of bed elevated  -       Row Name 09/18/23 0815          Transfers    Transfers sit-stand transfer;stand-sit transfer  -       Row Name 09/18/23 0815          Sit-Stand Transfer    Sit-Stand [place] (Transfers) independent  -       Row Name 09/18/23 0815          Stand-Sit Transfer    Stand-Sit [place] (Transfers) independent  -       Row Name 09/18/23 0815          Functional Mobility    Functional Mobility- Ind. Level independent  -     Functional Mobility-Distance (Feet) --  60  -HADLEY     Functional Mobility- Safety Issues supplemental O2  -HADLEY     Functional Mobility- Comment  02 saturation 88% as sat on bed, but then delayed drop to 82%, grossly 2 minutes PLB to recover  -[facility] Name 09/18/23 0815          Activities of Daily Living    BADL Assessment/Intervention lower body dressing  -[facility] Name 09/18/23 0815          Lower Body Dressing Assessment/Training    [place] Level (Lower Body Dressing) don;socks;independent  -     Position (Lower Body Dressing) edge of bed sitting  -               User Key  (r) = Recorded By, (t) = Taken By, (c) = Cosigned By      Initials Name Provider Type    Anna Maria, OT Occupational Therapist                   Obj/Interventions       Row Name 09/18/23 0816          Sensory Assessment (Somatosensory)    Sensory Assessment (Somatosensory) UE sensation intact  -[facility] Name 09/18/23 0816          Vision Assessment/Intervention    Visual Impairment/Limitations corrective lenses for reading  -[facility] Name 09/18/23 0816          Range of Motion Comprehensive    General Range of Motion bilateral upper extremity ROM WFL  -[facility] Name 09/18/23 0816          Strength Comprehensive (MMT)    General Manual Muscle Testing (MMT) Assessment upper extremity strength deficits identified  -     Comment, General Manual Muscle Testing (MMT) Assessment BUE grossly 4+/5  -[facility] Name 09/18/23 0816          Shoulder (Therapeutic Exercise)    Shoulder (Therapeutic Exercise) AROM (active range of motion)  -     Shoulder AROM (Therapeutic Exercise) 3 repetitions;5 repetitions;bilateral;flexion;extension;horizontal aBduction/aDduction;scapular retraction;aBduction;aDduction  cues for PLB with each and to pace self, 5 reps diaphragmatic breathing completed also, cues to make sure she fully expells between each repitition  -[facility] Name 09/18/23 0816          Motor Skills    Therapeutic Exercise shoulder  -[facility] Name 09/18/23 0816          Balance    Static Sitting Balance independent  -     Dynamic Sitting  Balance independent  -HADLEY     Position, Sitting Balance unsupported;sitting edge of bed  -HADLEY     Static Standing Balance independent  -HADLEY     Dynamic Standing Balance independent  -HADLEY     Position/Device Used, Standing Balance unsupported  -HADLEY     Balance Interventions sit to stand;occupation based/functional task  -HADLEY     Comment, Balance LBD  -HADLEY               User Key  (r) = Recorded By, (t) = Taken By, (c) = Cosigned By      Initials Name Provider Type    Anna Maria OT Occupational Therapist                   Goals/Plan       Row Name 09/18/23 0826          ROM Goal 1 (OT)    ROM Goal 1 (OT) Pt. will demonstrate independence using written HEP handout completing 5 reps each pulmonary TE.  -HADLEY     Time Frame (ROM Goal 1, OT) long term goal (LTG);10 days  -HADLEY     Progress/Outcome (ROM Goal 1, OT) new goal;goal ongoing  -HADLEY       Row Name 09/18/23 0826          Problem Specific Goal 1 (OT)    Problem Specific Goal 1 (OT) Pt. will complete a 10 minute ADL tasks with 50% of task in standing with 02 sats 88% or greater on 3L or less of 02 and ID 3 EC techniques she will use at home to support ADL independence.  -HADLEY     Time Frame (Problem Specific Goal 1, OT) long term goal (LTG);10 days  -HADLEY     Progress/Outcome (Problem Specific Goal 1, OT) new goal;goal ongoing  -HADLEY       Row Name 09/18/23 0826          Therapy Assessment/Plan (OT)    Planned Therapy Interventions (OT) activity tolerance training;BADL retraining;occupation/activity based interventions;patient/caregiver education/training;ROM/therapeutic exercise  -HADLEY               User Key  (r) = Recorded By, (t) = Taken By, (c) = Cosigned By      Initials Name Provider Type    Anna Maria OT Occupational Therapist                   Clinical Impression       Row Name 09/18/23 0821          Pain Assessment    Pretreatment Pain Rating 5/10  -HADLEY     Pain Location - Side/Orientation Bilateral  -HADLEY     Pain Location - abdomen;chest  -HADLEY      Pre/Posttreatment Pain Comment pain in chest and abdomen when coughing due to excess coughing  -       Row Name 09/18/23 0821          Plan of Care Review    Plan of Care Reviewed With patient  -HADLEY     Progress no change  -     Outcome Evaluation Pt. present with impaired activity tolerance and dyspnea with activity.  Patients 02 saturation with delayed drop to 82% with 60 ft mobility with grossly 2 minutes of PLB to recover.  Pt. with good balance and ROM.  Initiated pulmonary TE.  Pt. will benefit from skilled OT services to address deficit areas and promote return to prior high levle of independence working and performing all ADL tasks independently. Recommend home with assist at discharge and possible OP pulmonary rehab.  -       Row Name 09/18/23 0821          Therapy Assessment/Plan (OT)    Patient/Family Therapy Goal Statement (OT) return to PLOF  -     Rehab Potential (OT) good, to achieve stated therapy goals  -     Criteria for Skilled Therapeutic Interventions Met (OT) yes;meets criteria;skilled treatment is necessary  -     Therapy Frequency (OT) daily  -       Row Name 09/18/23 0821          Therapy Plan Review/Discharge Plan (OT)    Anticipated Discharge Disposition (OT) home with assist;home with outpatient therapy services  -       Row Name 09/18/23 0821          Vital Signs    Pre Systolic BP Rehab 151  -HADLEY     Pre Treatment Diastolic BP 82  -HADLEY     Post Systolic BP Rehab 149  -HADLEY     Post Treatment Diastolic BP 80  -HADLEY     Pretreatment Heart Rate (beats/min) 85  -HADLEY     Posttreatment Heart Rate (beats/min) 73  -HADLEY     Pre SpO2 (%) 90  -HADLEY     O2 Delivery Pre Treatment nasal cannula  -HADLEY     Intra SpO2 (%) 82   as high as 94% noted  -HADLEY     O2 Delivery Intra Treatment nasal cannula  -HADLEY     Post SpO2 (%) 92  -HADLEY     O2 Delivery Post Treatment nasal cannula  -HADLEY     Pre Patient Position Supine  -HADLEY     Intra Patient Position Standing  -HADLEY     Post Patient Position Supine  -HADLEY        Row Name 09/18/23 0821          Positioning and Restraints    Pre-Treatment Position in bed  -HADLEY     Post Treatment Position bed  -HADLEY     In Bed sitting;call light within reach;side rails up x2  -HADLEY               User Key  (r) = Recorded By, (t) = Taken By, (c) = Cosigned By      Initials Name Provider Type    Anna Maria OT Occupational Therapist                   Outcome Measures       Row Name 09/18/23 0828          How much help from another is currently needed...    Putting on and taking off regular lower body clothing? 4  extra time with long rest periods  -HADLEY     Bathing (including washing, rinsing, and drying) 4  extra time with long rest periods  -HADLEY     Toileting (which includes using toilet bed pan or urinal) 4  -HADLEY     Putting on and taking off regular upper body clothing 4  -HADLEY     Taking care of personal grooming (such as brushing teeth) 4  -HADLEY     Eating meals 4  -HADLEY     AM-PAC 6 Clicks Score (OT) 24  -HADLEY       Row Name 09/18/23 0828          Functional Assessment    Outcome Measure Options AM-PAC 6 Clicks Daily Activity (OT)  -HADLEY               User Key  (r) = Recorded By, (t) = Taken By, (c) = Cosigned By      Initials Name Provider Type    Anna Maria OT Occupational Therapist                    Occupational Therapy Education       Title: PT OT SLP Therapies (In Progress)       Topic: Occupational Therapy (In Progress)       Point: ADL training (Done)       Description:   Instruct learner(s) on proper safety adaptation and remediation techniques during self care or transfers.   Instruct in proper use of assistive devices.                  Learning Progress Summary             Patient Acceptance, E,D, VU,NR by HADLEY at 9/18/2023 0829    Comment: reason for consult, noted deficits, readings on monitor, PLB/diaphragmatic breathing, importance of expelling all of air                         Point: Home exercise program (Done)       Description:   Instruct learner(s) on appropriate technique  for monitoring, assisting and/or progressing therapeutic exercises/activities.                  Learning Progress Summary             Patient Acceptance, E,D, VU,NR by HADLEY at 9/18/2023 0829    Comment: reason for consult, noted deficits, readings on monitor, PLB/diaphragmatic breathing, importance of expelling all of air                         Point: Precautions (Not Started)       Description:   Instruct learner(s) on prescribed precautions during self-care and functional transfers.                  Learner Progress:  Not documented in this visit.              Point: Body mechanics (Done)       Description:   Instruct learner(s) on proper positioning and spine alignment during self-care, functional mobility activities and/or exercises.                  Learning Progress Summary             Patient Acceptance, E,D, VU,NR by HADLEY at 9/18/2023 0829    Comment: reason for consult, noted deficits, readings on monitor, PLB/diaphragmatic breathing, importance of expelling all of air                                         User Key       Initials Effective Dates Name Provider Type Discipline    HADLEY 07/11/23 -  Anna Rosario, OT Occupational Therapist OT                  OT Recommendation and Plan  Planned Therapy Interventions (OT): activity tolerance training, BADL retraining, occupation/activity based interventions, patient/caregiver education/training, ROM/therapeutic exercise  Therapy Frequency (OT): daily  Plan of Care Review  Plan of Care Reviewed With: patient  Progress: no change  Outcome Evaluation: Pt. present with impaired activity tolerance and dyspnea with activity.  Patients 02 saturation with delayed drop to 82% with 60 ft mobility with grossly 2 minutes of PLB to recover.  Pt. with good balance and ROM.  Initiated pulmonary TE.  Pt. will benefit from skilled OT services to address deficit areas and promote return to prior high levle of independence working and performing all ADL tasks independently.  Recommend home with assist at discharge and possible OP pulmonary rehab.     Time Calculation:   Evaluation Complexity (OT)  Review Occupational Profile/Medical/Therapy History Complexity: brief/low complexity  Assessment, Occupational Performance/Identification of Deficit Complexity: 1-3 performance deficits  Clinical Decision Making Complexity (OT): problem focused assessment/low complexity  Overall Complexity of Evaluation (OT): low complexity     Time Calculation- OT       Row Name 09/18/23 0830             Time Calculation- OT    OT Start Time 0745  -HADLEY      OT Received On 09/18/23  -HADLEY      OT Goal Re-Cert Due Date 09/28/23  -HADLEY         Timed Charges    50171 - OT Therapeutic Exercise Minutes 7  -HADLEY      50785 - OT Therapeutic Activity Minutes 5  -HADLEY         Untimed Charges    OT Eval/Re-eval Minutes 32  -HADLEY         Total Minutes    Timed Charges Total Minutes 12  -HADLEY      Untimed Charges Total Minutes 32  -HADLEY       Total Minutes 44  -HADLEY                User Key  (r) = Recorded By, (t) = Taken By, (c) = Cosigned By      Initials Name Provider Type    Anna Maria, OT Occupational Therapist                  Therapy Charges for Today       Code Description Service Date Service Provider Modifiers Qty    [id number]  OT THER PROC EA 15 MIN 9/18/2023 Anna Rosario, OT GO 1    [id number]  OT EVAL LOW COMPLEXITY 3 9/18/2023 Anna Rosario OT GO 1                 Anna Rosario OT  9/18/2023

## 2023-09-18 NOTE — CONSULTS
Clinical Nutrition   Nutrition Support Assessment  Reason for Visit: Consult/MSA      Patient Name: Mary West  YOB: 1960  MRN: [id number]  Date of Encounter: 09/18/23 10:18 EDT  Admission date: 9/16/2023    Comments:  Pt meets criteria for severe malnutrition in the context of chronic illness indicated by severe muscle wasting and subcutaneous fat loss. Of note, pt met criteria on 7/18/2022.     Ordered Boost BID w/breakfast and dinner, Boost VHC w/lunch.     Ordered snacks between meals.   Nutrition Assessment   Admission Diagnosis:  COPD (chronic obstructive pulmonary disease) [J44.9]      Problem List:    COPD with acute exacerbation    Acute on chronic respiratory failure with hypoxia and hypercapnia    Chronic Pancreatitis    COPD exacerbation    Severe malnutrition    COPD (chronic obstructive pulmonary disease)    Hypokalemia    Elevated troponin    Syncope        PMH:   She  has a past medical history of Alcohol abuse, COPD (chronic obstructive pulmonary disease), and Pancreatitis.    PSH:  She  has no past surgical history on file.    Applicable Nutrition Concerns:   Skin:  Oral:  GI:    Applicable Interval History:         Reported/Observed/Food/Nutrition Related History:     RD spoke w/pt at bedside, reports decreased po intake x 1 week 2/2 unable to keep any food down, suspecting pancreatitis flare. Pt reports she normally eats well, has been using [employer] for meals. Pt states normally she has boost for breakfast, sandwich or 's salad for lunch and ramen or similar for dinner. Pt states she stopped drinking Boost at home 2/2 insurance stopped sending, though is willing to drink while here. Per previous RD note, pt had snacks daily, pt is agreeable to snacks again and RD obtained preferences. Pt reports MTU=120fpj and reports her current wt is very low for her. Pt confirms allergy to mushrooms and green peppers.     Anthropometrics     Flowsheet Rows   "    Flowsheet Row First Filed Value   Admission Height 170.2 cm (67\") Documented at 09/16/2023 1939   Admission Weight 39 kg (86 lb) Documented at 09/16/2023 1939              Height: Height: 170.2 cm (67.01\")  Last Filed Weight: Weight: 38.1 kg (84 lb 1.6 oz) (09/18/23 0600)  Method: Weight Method: Bed scale  BMI: BMI (Calculated): 13.2  BMI classification: Underweight:<18.5kg/m2    UBW:  100-102lbs per pt report  Weight change:   able to confirm pt's wt of 97lbs in Feb 2022.    Weight      Weight (kg) Weight (lbs) Weight Method   7/17/2023 37.195 kg  82 lb  Bed scale     37.195 kg  82 lb  Bed scale     37.2 kg  82 lb 0.2 oz     9/16/2023 39.009 kg  86 lb  Stated    9/17/2023 39 kg  85 lb 15.7 oz     9/18/2023 38.148 kg  84 lb 1.6 oz  Bed scale        Nutrition Focused Physical Exam     Date:     9/18    Patient meets criteria for malnutrition diagnosis, see MSA note.    Current Nutrition Prescription   PO: Diet: Regular/House Diet; Texture: Regular Texture (IDDSI 7); Fluid Consistency: Thin (IDDSI 0)  Oral Nutrition Supplement:   Intake: Insufficient data      Nutrition Diagnosis   Date:  9/18            Updated:    Problem Malnutrition chronic severe   Etiology Energy needs > energy intake 2/2 COPD   Signs/Symptoms Severe muscle wasting and subcutaneous fat loss   Status:     Goal:   General: Nutrition to support treatment  PO: Increase intake  EN/PN: N/A    Nutrition Intervention      Follow treatment progress, Care plan reviewed, Advised available snacks, Interview for preferences, Encourage intake, Supplement provided    Ordered Boost BID w/breakfast and dinner, Boost VHC w/lunch.     Ordered snacks between meals. Fresh fruit, ice cream, pudding, turkey sandwich w/gonzales, lettuce and cheese, pretzels    Monitoring/Evaluation:   Per protocol, PO intake, Supplement intake, Weight      Elba Vicente RD  Time Spent: 30  "

## 2023-09-18 NOTE — CONSULTS
Referring Provider: RAFA Lewis  Reason for Consultation: AECOPD    Subjective .   Education: NN spoke with pt at BS.  Pt alert and able to answer questions appropriately.  Pt O2 sat 88% on  4 L currently, home O2 use unclear.  The patient states she is not sure what her concentrator is set to deliver and she states that she is out of O2 tanks.  Pt reports the ability to ambulate without issue at baseline before experiencing SOB.  Pt states use of rescue medication TNTC weekly, relief of SOB within ~2 mins.  Patient is up to date on COVID, flu and PNA vaccines.  Former smoker, quit date 8/2023.  She states that she does smoke cigarettes occasionally but has been mainly vaping over the last month.  Patient educated on the dangers of vaping and complete cessation encouraged.  Pt reports no issues at this time with medications or transportation for appointments.  Pt with previous hx of formal COPD teaching, no understanding of action plan, or NV.  COPD education reviewed in the form of explanation, handouts, and videos.  No new concerns or questions voiced at this time.  NN will continue to follow as needed.     Age: 63 y.o.  Sex: female  Smoker Status: former, pack years unclear  Pulmonologist: KAM  FEV1 (PFT): NA  Home O2: Unclear    Objective     SpO2 SpO2: 91 % (09/18/23 1528)  Device Device (Oxygen Therapy): humidified, nasal cannula (09/18/23 1528)  Flow Flow (L/min): 4 (09/18/23 1528)  Incentive Spirometer    IS Predicted Level (mL)     Number of Repetitions     Level Incentive Spirometer (mL)    Patient Tolerance     Inhaler Treatment Status Respiratory Treatment Status (Inhaler): given (09/18/23 0726)  Treatment Route Respiratory Treatment Status (Inhaler): given (09/18/23 0726)      Home Medications:  Medications Prior to Admission   Medication Sig Dispense Refill Last Dose    albuterol sulfate  (90 Base) MCG/ACT inhaler Inhale 2 puffs Every 4 (Four) Hours As Needed for Wheezing. 6.7 g 11 9/16/2023     Fluticasone-Umeclidin-Vilant (Trelegy Ellipta) 100-62.5-25 MCG/ACT inhaler Inhale 1 puff Daily. 1 each 11 9/16/2023    multivitamin with minerals tablet tablet Take 1 tablet by mouth Daily. 90 each 3 9/16/2023    oxyCODONE-acetaminophen (PERCOCET)  MG per tablet Take 1 tablet by mouth Every 4 (Four) Hours As Needed.   9/16/2023    Pancrelipase, Lip-Prot-Amyl, (CREON) 99247-121054 units capsule delayed-release particles capsule Take 1 capsule by mouth 3 (Three) Times a Day With Meals. 90 capsule 3 9/16/2023    calcium carbonate (TUMS) 500 MG chewable tablet Chew 2 tablets 2 (Two) Times a Day As Needed for Heartburn. OTC   Unknown    ipratropium-albuterol (DUO-NEB) 0.5-2.5 mg/3 ml nebulizer Take 3 mL by nebulization Every 4 (Four) Hours. 360 mL 5 Unknown       Discussion: Per current GOLD Standards, please consider: LAMA/LABA/ICS in place (Trelegy), Outpatient PFT, Rehab as appropriate, Palliative Care consult, NRT at ID, Annual LDCT per current screening guidelines (age 50-80 years old, smoking history of 20 pack years or more or has quit within past 15 years)           Tigist Umaña RN

## 2023-09-18 NOTE — CONSULTS
Malnutrition Severity Assessment    Patient Name:  Mary West  YOB: 1960  MRN: [id number]  Admit Date:  9/16/2023    Patient meets criteria for : Severe Malnutrition    Comments:  Pt meets criteria for severe malnutrition in the context of chronic illness indicated by severe muscle wasting and subcutaneous fat loss. Of note, pt met criteria on 7/18/2022.     Malnutrition Severity Assessment  Malnutrition Type: Chronic Disease - Related Malnutrition  Malnutrition Type (last 8 hours)       Malnutrition Severity Assessment       Row Name 09/18/23 1039       Malnutrition Severity Assessment    Malnutrition Type Chronic Disease - Related Malnutrition      Row Name 09/18/23 1039       Unintentional Weight Loss     Unintentional Weight Loss Findings --  limited wt hx available though pt does note wt loss, ? 13lbs      Row Name 09/18/23 1039       Muscle Loss    Loss of Muscle Mass Findings Severe    [place] Severe - deep hollowing/scooping, lack of muscle to touch, facial bones well defined    Clavicle Bone Region Severe - protruding prominent bone    Acromion Bone Region Severe - squared shoulders, bones, and acromion process protrusion prominent    Scapular Bone Region Severe - prominent bones, depressions easily visible between ribs, scapula, spine, shoulders    Dorsal Hand Region Severe - prominent depression    Patellar Region Severe - prominent bone, square looking, very little muscle definition    Anterior Thigh Region Severe - line/depression along thigh, obviously thin    Posterior Calf Region Severe - thin with very little definition/firmness      Row Name 09/18/23 1039       Fat Loss    Subcutaneous Fat Loss Findings Severe    Orbital Region  Severe - pronounced hollowness/depression, dark circles, loose saggy skin    Upper Arm Region Severe - mostly skin, very little space between folds, fingers touch    Thoracic & Lumbar Region Severe - ribs visible with prominent depressions, iliac  crest very prominent      Row Name 09/18/23 1039       Criteria Met (Must meet criteria for severity in at least 2 of these categories: M Wasting, Fat Loss, Fluid, Secondary Signs, Wt. Status, Intake)    Patient meets criteria for  Severe Malnutrition                    Electronically signed by:  Elba Vicente RD  09/18/23 10:41 EDT

## 2023-09-18 NOTE — CASE MANAGEMENT/SOCIAL WORK
Discharge Planning Assessment  [facility]     Patient Name: Mary West  MRN: [id number]  Today's Date: 9/18/2023    Admit Date: 9/16/2023    Plan: Home   Discharge Needs Assessment       Row Name 09/18/23 1747       Living Environment    People in Home alone    Current Living Arrangements apartment    Potentially Unsafe Housing Conditions none    Primary Care Provided by self    Provides Primary Care For no one    Family Caregiver if Needed friend(s)    Quality of Family Relationships unable to assess    Able to Return to Prior Arrangements yes       Resource/Environmental Concerns    Resource/Environmental Concerns none    Transportation Concerns no car       Transition Planning    Patient/Family Anticipates Transition to home    Patient/Family Anticipated Services at Transition     Transportation Anticipated family or friend will provide;other (see comments)  may need lyft       Discharge Needs Assessment    Readmission Within the Last 30 Days no previous admission in last 30 days    Equipment Currently Used at Home oxygen    Concerns to be Addressed discharge planning    Anticipated Changes Related to Illness none    Equipment Needed After Discharge nebulizer                   Discharge Plan       Row Name 09/18/23 1743       Plan    Plan Home    Patient/Family in Agreement with Plan yes    Plan Comments Spoke with patient in room to initiate discharge planning. Patient lives at home alone in [facility]. Prior to admission, she was independent with ADL's. No DME for mobility. Not current with home health. She states she wears home oxygen at 2-4 L continuously, except when at work & it is supplied through [employer]. She doesn't have a PCP. She will need a nebulizer. She has drug coverage and scripts filled at [facility]. Verified [employer] with patient. Her plan is home. She may need transport (Lyft) at discharge, if friend Alonso is working. CM will continue to follow.    Final  Discharge Disposition Code 01 - home or self-care                  Continued Care and Services - Admitted Since 9/16/2023    Coordination has not been started for this encounter.       Selected Continued Care - Prior Encounters Includes continued care and service providers with selected services from prior encounters from 6/18/2023 to 9/18/2023      Discharged on 7/23/2023 Admission date: 7/17/2023 - Discharge disposition: Home or Self Care      Durable Medical Equipment       Service Provider Selected Services Address Phone Fax Patient Preferred    [facility] [address] [place] [phone] --                          Expected Discharge Date and Time       Expected Discharge Date Expected Discharge Time    Sep 20, 2023            Demographic Summary       Row Name 09/18/23 1749       General Information    Admission Type inpatient    Arrived From emergency department    Referral Source admission list    Reason for Consult discharge planning    Preferred Language English                   Functional Status       Row Name 09/18/23 1746       Functional Status    Usual Activity Tolerance moderate    Current Activity Tolerance moderate       Functional Status, IADL    Medications independent    Meal Preparation independent    Housekeeping independent    Laundry independent    Shopping independent                   Psychosocial    No documentation.                  Abuse/Neglect    No documentation.                  Legal    No documentation.                  Substance Abuse    No documentation.                  Patient Forms    No documentation.                     Alicia Adrian RN

## 2023-09-18 NOTE — PLAN OF CARE
Goal Outcome Evaluation:  Plan of Care Reviewed With: patient        Progress: no change  Outcome Evaluation: Pt. present with impaired activity tolerance and dyspnea with activity.  Patients 02 saturation with delayed drop to 82% with 60 ft mobility with grossly 2 minutes of PLB to recover.  Pt. with good balance and ROM.  Initiated pulmonary TE.  Pt. will benefit from skilled OT services to address deficit areas and promote return to prior high levle of independence working and performing all ADL tasks independently. Recommend home with assist at discharge and possible OP pulmonary rehab.      Anticipated Discharge Disposition (OT): home with assist, home with outpatient therapy services

## 2023-09-18 NOTE — PROGRESS NOTES
[facility] Medicine Services  PROGRESS NOTE    Patient Name: Mary West  : 1960  MRN: [id number]    Date of Admission: 2023  Primary Care Physician: Provider, No Known    Subjective   Subjective     CC:  F/U AE COPD    HPI:  Patient seen and examined. States she feels better. Still on 6L NC.     Objective   Objective     Vital Signs:   Temp:  [97.7 °F (36.5 °C)-98.5 °F (36.9 °C)] 97.8 °F (36.6 °C)  Heart Rate:  [65-91] 91  Resp:  [16-20] 20  BP: (114-166)/(69-92) 129/75  Flow (L/min):  [3-6] 3     Physical Exam:  Constitutional: No acute distress, awake, alert, appears older than stated age  HENT: NCAT, mucous membranes moist  Respiratory: Decreased in bases, respiratory effort normal, exp wheezes  Cardiovascular: RRR, no murmurs, rubs, or gallops  Gastrointestinal: Positive bowel sounds, soft, nontender, nondistended  Musculoskeletal: No bilateral ankle edema  Psychiatric: Appropriate affect, cooperative  Neurologic: Oriented x 3, GOODWIN, speech clear  Skin: No rashes     Results Reviewed:  LAB RESULTS:      Lab 23  0823   WBC 9.97 4.86 8.27   HEMOGLOBIN 13.7 14.2 13.6   HEMATOCRIT 42.2 44.0 42.6   PLATELETS 215 225 232   NEUTROS ABS  --  4.16 5.48   IMMATURE GRANS (ABS)  --  0.01 0.03   LYMPHS ABS  --  0.59* 2.10   MONOS ABS  --  0.09* 0.51   EOS ABS  --  0.00 0.10   .4* 103.0* 103.6*   CRP  --   --  <0.30   PROCALCITONIN  --   --  0.04   LACTATE  --   --  2.0   LDH  --   --  244*   D DIMER QUANT  --   --  1.11*         Lab 23  0823   SODIUM 142 144 146*   POTASSIUM 5.2 4.3 3.3*   CHLORIDE 106 106 106   CO2 29.0 28.0 30.0*   ANION GAP 7.0 10.0 10.0   BUN 20 15 13   CREATININE 0.46* 0.68 0.55*   EGFR 107.7 98.0 103.1   GLUCOSE 170* 197* 93   CALCIUM 8.9 8.8 9.2   MAGNESIUM  --  1.7  --    PHOSPHORUS  --  3.4  --          Lab 23   TOTAL PROTEIN 6.3   ALBUMIN 4.1   GLOBULIN 2.2   ALT  (SGPT) 10   AST (SGOT) 22   BILIRUBIN 0.6   ALK PHOS 82         Lab 09/17/23  1003 09/17/23  0801 09/17/23  0029 09/16/23 2010   PROBNP  --   --   --  7,481.0*   HSTROP T 13* 14* 20* 24*                 Lab 09/16/23 2039   PH, ARTERIAL 7.435   PCO2, ARTERIAL 45.5*   PO2 ART 71.4*   FIO2 44   HCO3 ART 30.6*   BASE EXCESS ART 5.5*   CARBOXYHEMOGLOBIN 3.9*     Brief Urine Lab Results       None            Microbiology Results Abnormal       Procedure Component Value - Date/Time    COVID PRE-OP / PRE-PROCEDURE SCREENING ORDER (NO ISOLATION) - Swab, Nasopharynx [[id number]]  (Normal) Collected: 09/16/23 2021    Lab Status: Final result Specimen: Swab from Nasopharynx Updated: 09/16/23 2119    Narrative:      The following orders were created for panel order COVID PRE-OP / PRE-PROCEDURE SCREENING ORDER (NO ISOLATION) - Swab, Nasopharynx.  Procedure                               Abnormality         Status                     ---------                               -----------         ------                     Respiratory Panel PCR w/...[[id number]]  Normal              Final result                 Please view results for these tests on the individual orders.    Respiratory Panel PCR w/COVID-19(SARS-CoV-2) JONAS/RINA/NAFISA/PAD/COR/MAD/SALOME In-House, NP Swab in UTM/VTM, 3-4 HR TAT - Swab, Nasopharynx [[id number]]  (Normal) Collected: 09/16/23 2021    Lab Status: Final result Specimen: Swab from Nasopharynx Updated: 09/16/23 2119     ADENOVIRUS, PCR Not Detected     Coronavirus 229E Not Detected     Coronavirus HKU1 Not Detected     Coronavirus NL63 Not Detected     Coronavirus OC43 Not Detected     COVID19 Not Detected     Human Metapneumovirus Not Detected     Human Rhinovirus/Enterovirus Not Detected     Influenza A PCR Not Detected     Influenza B PCR Not Detected     Parainfluenza Virus 1 Not Detected     Parainfluenza Virus 2 Not Detected     Parainfluenza Virus 3 Not Detected     Parainfluenza Virus 4 Not Detected     RSV,  PCR Not Detected     Bordetella pertussis pcr Not Detected     Bordetella parapertussis PCR Not Detected     Chlamydophila pneumoniae PCR Not Detected     Mycoplasma pneumo by PCR Not Detected    Narrative:      In the setting of a positive respiratory panel with a viral infection PLUS a negative procalcitonin without other underlying concern for bacterial infection, consider observing off antibiotics or discontinuation of antibiotics and continue supportive care. If the respiratory panel is positive for atypical bacterial infection (Bordetella pertussis, Chlamydophila pneumoniae, or Mycoplasma pneumoniae), consider antibiotic de-escalation to target atypical bacterial infection.            Adult Transthoracic Echo Complete With Contrast if Necessary Per Protocol    Result Date: 9/17/2023    Left ventricular systolic function is normal. Calculated left ventricular EF = 54.4%   Left ventricular diastolic function is consistent with (grade I) impaired relaxation.   The right ventricular cavity is mildly dilated.   The right atrial cavity is mildly  dilated.   Moderate tricuspid valve regurgitation is present.   Estimated right ventricular systolic pressure from tricuspid regurgitation is markedly elevated (>55 mmHg). Calculated right ventricular systolic pressure from tricuspid regurgitation is 76 mmHg.   Severe pulmonary hypertension is present.     CT Head Without Contrast    Result Date: 9/17/2023  CT HEAD WO CONTRAST Date of Exam: 9/17/2023 1:47 PM EDT Indication: Syncope/presyncope, cerebrovascular cause suspected. Comparison: None available. Technique: Axial CT images were obtained of the head without contrast administration.  Automated exposure control and iterative construction methods were used. FINDINGS: Gray-white differentiation is maintained and there is no evidence of intracranial hemorrhage, mass or mass effect. Age-related changes of the brain are present including volume loss and typical  periventricular sequela of chronic small vessel ischemia. There is otherwise no evidence of intracranial hemorrhage, mass or mass effect. The ventricles are normal in size and configuration accounting for surrounding volume loss. The orbits are normal and the paranasal sinuses are grossly clear. There  is a prominent chronic appearing left mastoid effusion in addition to soft tissue density present within both external auditory canals, possible cerumen.     Impression: Age-related changes of the brain as above, otherwise without evidence of acute intracranial abnormality. Electronically Signed: Broderick Reynoso MD  9/17/2023 2:14 PM EDT  Workstation ID: [id number]    XR Chest 1 View    Result Date: 9/16/2023  XR CHEST 1 VW Date of Exam: 9/16/2023 8:40 PM EDT Indication: shortness of breath Comparison: Chest CT 7/17/2023 and chest radiograph same date. Findings: There is diffuse interstitial prominence and there are cystic areas of lucency in both lungs. There are stable small bilateral pleural effusions. There is improved left midlung airspace disease. No definite new focal lung opacity. No pneumothorax. Heart size is unchanged. Bones are demineralized. No definite acute osseous abnormality. Stable mild lower thoracic compression deformity.     Impression: Impression: 1.Improved left midlung airspace disease. 2.Stable small bilateral pleural effusions. 3.Stable chronic changes in the lungs. Electronically Signed: Bishop Anand MD  9/16/2023 9:17 PM EDT  Workstation ID: [id number]    CT Angiogram Chest    Result Date: 9/16/2023  CT ANGIOGRAM CHEST Date of Exam: 9/16/2023 11:26 PM EDT Indication: SOA, hypoxia, positive D Dimer, r/o PE. Comparison: 7/17/2023. Technique: CTA of the chest was performed after the uneventful intravenous administration of 75 mL Isovue-370. Reconstructed coronal and sagittal images were also obtained. In addition, a 3-D volume rendered image was created for interpretation. Automated exposure  control and iterative reconstruction methods were used. Findings: There is no evidence of pulmonary embolus. Stable small bilateral pleural effusions, slightly larger on the right. Stable severe emphysema. Interlobular septal thickening may represent mild superimposed interstitial edema. There is stable scarring along the posterior left lower lobe superiorly. There is no pneumothorax or lobar consolidation. Airways appear patent. Thyroid is not well seen. There is mild aortic atherosclerosis. The heart is overall normal size, but the right ventricle appears enlarged. The effusion. There is mild coronary artery calcification. No mediastinal lymphadenopathy. No acute findings in the superficial soft tissues. No acute findings in the limited evaluation of the upper abdomen. Patient is status post cholecystectomy. There is mild pneumobilia. This is unchanged. There are no acute osseous abnormalities or destructive bone lesions. There is a stable mild compression deformity at T12. Bones are demineralized. Stable ununited chronic left lateral eighth rib fracture.     Impression: Impression: 1.No evidence of pulmonary embolus. 2.Stable small bilateral pleural effusions, slightly larger on the right. 3.Stable severe emphysema. 4.Stable enlargement of the right ventricle and mild coronary artery calcification. Electronically Signed: Bishop Anand MD  9/16/2023 11:52 PM EDT  Workstation ID: [id number]     Results for orders placed during the hospital encounter of 09/16/23    Adult Transthoracic Echo Complete With Contrast if Necessary Per Protocol    Interpretation Summary    Left ventricular systolic function is normal. Calculated left ventricular EF = 54.4%    Left ventricular diastolic function is consistent with (grade I) impaired relaxation.    The right ventricular cavity is mildly dilated.    The right atrial cavity is mildly  dilated.    Moderate tricuspid valve regurgitation is present.    Estimated right ventricular  systolic pressure from tricuspid regurgitation is markedly elevated (>55 mmHg). Calculated right ventricular systolic pressure from tricuspid regurgitation is 76 mmHg.    Severe pulmonary hypertension is present.      Current medications:  Scheduled Meds:Pharmacy Consult, , Does not apply, Q12H  Albuterol Sulfate NEB Orderable, 2.5 mg, Nebulization, 4x Daily - RT  budesonide-formoterol, 2 puff, Inhalation, BID - RT   And  tiotropium bromide monohydrate, 2 puff, Inhalation, Daily - RT  doxycycline, 100 mg, Oral, Q12H  enoxaparin, 30 mg, Subcutaneous, Daily  furosemide, 40 mg, Intravenous, Q12H  methylPREDNISolone sodium succinate, 40 mg, Intravenous, Q8H  multivitamin with minerals, 1 tablet, Oral, Daily  Pancrelipase (Lip-Prot-Amyl), 36,000 units of lipase, Oral, TID With Meals  pharmacy consult - [place], , Does not apply, Daily  senna-docusate sodium, 2 tablet, Oral, BID  sodium chloride, 10 mL, Intravenous, Q12H      Continuous Infusions:   PRN Meds:.  acetaminophen **OR** acetaminophen **OR** acetaminophen    senna-docusate sodium **AND** polyethylene glycol **AND** bisacodyl **AND** bisacodyl    calcium carbonate    Calcium Replacement - Follow Nurse / BPA Driven Protocol    ipratropium-albuterol    Magnesium Standard Dose Replacement - Follow Nurse / BPA Driven Protocol    oxyCODONE-acetaminophen    Phosphorus Replacement - Follow Nurse / BPA Driven Protocol    Potassium Replacement - Follow Nurse / BPA Driven Protocol    [COMPLETED] Insert Peripheral IV **AND** sodium chloride    sodium chloride    sodium chloride    Assessment & Plan   Assessment & Plan     Active Hospital Problems    Diagnosis  POA    **COPD with acute exacerbation [J44.1]  Yes    COPD (chronic obstructive pulmonary disease) [J44.9]  Yes    Hypokalemia [E87.6]  Yes    Elevated troponin [R77.8]  Yes    Syncope [R55]  Yes    Severe malnutrition [E43]  Yes    COPD exacerbation [J44.1]  Yes    Acute on chronic respiratory failure with hypoxia and  hypercapnia [J96.21, J96.22]  Yes    Chronic Pancreatitis [K85.90]  Yes      Resolved Hospital Problems   No resolved problems to display.        Brief Hospital Course to date:  Mary West is a 63 y.o. female with PMH significant for COPD with chronic respiratory failure on 4 LNC, chronic pancreatitis, malnutrition, who presents to the ED with complaint of shortness of breath and syncope who was found to have concern for COPD with acute exacerbation.    This patient's problems and plans were partially entered by my partner and updated as appropriate by me 09/18/23.      Acute on chronic respiratory failure with hypercapnia and hypoxia  COPD exacerbation  Pulmonary HTN   HFpEF   - Baseline supplemental oxygen 4L NC (per her report, will have CM clarify), wean as tolerated   - Scheduled and as needed DuoNebs  - Continue Symbicort/Spiriva   - Continue Solu-Medrol 40 mg 3 times daily for now with plan to taper as appropriate  - COPD navigator  - Pulmonology following, appreciate assistance   - Continue Doxycycline  - Will need a nebulizer machine for home  - Lasix 40mg IV q12 x 2 doses   -Echo with EF 54.5%, grade 1 diastolic dysfunction, mod TVR, RVSP>55, severe pulmonary HTN     Syncope  - Orthostatic BP negative   - Likely related to hypoxia  - Echo as above  - Fall precautions  - CT head negative     Elevated troponin  Elevated proBNP  - Troponin trending down  - Denies chest pain  - Echo as above  -- IV Lasix x2 doses     Hypokalemia  - Electrolyte replacement per protocol      Chronic pancreatitis  - Continue Creon     Severe malnutrition  - RD following      Tobacco abuse  - Reports longer smoking, but now using vapes  - Encouraged cessation    Expected Discharge Location and Transportation: Home  Expected Discharge   Expected Discharge Date: 9/20/2023; Expected Discharge Time:      DVT prophylaxis:  Medical DVT prophylaxis orders are present.     AM-PAC 6 Clicks Score (PT): 22 (09/18/23 4756)    CODE STATUS:    Code Status and Medical Interventions:   Ordered at: 09/17/23 0159     Level Of Support Discussed With:    Patient     Code Status (Patient has no pulse and is not breathing):    CPR (Attempt to Resuscitate)     Medical Interventions (Patient has pulse or is breathing):    Full Support       Monica Gupta, DO  09/18/23

## 2023-09-18 NOTE — CONSULTS
Pulmonary New Patient Evaluation     REFERRING PHYSICIAN:  Monica Gupta    Subjective       Chief Complaint   Patient presents with    Shortness of Breath            SUBJECTIVE     Ms. West is a 64yo F with a history of COPD on 2L home O2 and ongoing tobacco use who presented to the [facility] ED on 9/17/23 with worsening shortness of breath.     CTA of the chest in the ED was negative for PE. She was noted to have severe emphysema and small bilateral pleural effusions.     BNP was elevated at 7481.     She was admitted to Hospital Medicine and started on Doxycycline and Solumedrol for COPD exacerbation.     She does not follow with Pulmonary Medicine. We do not have any PFTs available to assess the severity of her COPD.     Since admission, she has continued to require 6L nasal cannula.       PMH: She  has a past medical history of Alcohol abuse, COPD (chronic obstructive pulmonary disease), and Pancreatitis.   PSxH: She  has no past surgical history on file.      Medications:     Current Facility-Administered Medications:     !patient's home meds stored in pharmacy, please  prior to discharge, , Does not apply, Q12H, Tain Lui, PharmD    acetaminophen (TYLENOL) tablet 650 mg, 650 mg, Oral, Q4H PRN, 650 mg at 09/17/23 1701 **OR** acetaminophen (TYLENOL) 160 MG/5ML oral solution 650 mg, 650 mg, Oral, Q4H PRN **OR** acetaminophen (TYLENOL) suppository 650 mg, 650 mg, Rectal, Q4H PRN, Shaunna Lewis R, APRN    albuterol (PROVENTIL) nebulizer solution 0.083% 2.5 mg/3mL, 2.5 mg, Nebulization, 4x Daily - RT, Monica Gupta, DO, 2.5 mg at 09/18/23 0724    sennosides-docusate (PERICOLACE) 8.6-50 MG per tablet 2 tablet, 2 tablet, Oral, BID, 2 tablet at 09/17/23 2002 **AND** polyethylene glycol (MIRALAX) packet 17 g, 17 g, Oral, Daily PRN **AND** bisacodyl (DULCOLAX) EC tablet 5 mg, 5 mg, Oral, Daily PRN **AND** bisacodyl (DULCOLAX) suppository 10 mg, 10 mg, Rectal, Daily PRN, Shaunna Lewis, APRN     budesonide-formoterol (SYMBICORT) 160-4.5 MCG/ACT inhaler 2 puff, 2 puff, Inhalation, BID - RT, 2 puff at 09/18/23 0726 **AND** tiotropium (SPIRIVA RESPIMAT) 2.5 mcg/act aerosol solution inhaler, 2 puff, Inhalation, Daily - RT, Shaunna Lewis APRN, 2 puff at 09/18/23 0726    calcium carbonate (TUMS) chewable tablet 500 mg (200 mg elemental), 2 tablet, Oral, BID PRN, Shaunna Lewis APRN    Calcium Replacement - Follow Nurse / BPA Driven Protocol, , Does not apply, PRN, Shaunna Lewis APRN    doxycycline (MONODOX) capsule 100 mg, 100 mg, Oral, Q12H, Shaunna Lewis APRN, 100 mg at 09/17/23 2002    Enoxaparin Sodium (LOVENOX) syringe 30 mg, 30 mg, Subcutaneous, Daily, Shaunna Lewis APRN, 30 mg at 09/17/23 0841    furosemide (LASIX) injection 40 mg, 40 mg, Intravenous, Q12H, Case, Rebekah V., DO    ipratropium-albuterol (DUO-NEB) nebulizer solution 3 mL, 3 mL, Nebulization, Q4H PRN, Shaunna Lewis APRN    Magnesium Standard Dose Replacement - Follow Nurse / BPA Driven Protocol, , Does not apply, PRN, Shaunna Lewis APRN    methylPREDNISolone sodium succinate (SOLU-Medrol) injection 40 mg, 40 mg, Intravenous, Q8H, Shaunna Lewis APRN, 40 mg at 09/17/23 2329    multivitamin with minerals 1 tablet, 1 tablet, Oral, Daily, Shaunna Lewis APRN, 1 tablet at 09/17/23 0841    oxyCODONE-acetaminophen (PERCOCET)  MG per tablet 1 tablet, 1 tablet, Oral, Q4H PRN, Monica Gupta DO, 1 tablet at 09/17/23 1714    Pancrelipase (Lip-Prot-Amyl) (CREON) capsule 36,000 units of lipase, 36,000 units of lipase, Oral, TID With Meals, Lewis, Shaunna R, APRN, 36,000 units of lipase at 09/18/23 0901    Pharmacy Consult - MTM, , Does not apply, Daily, Lashanda Houston, JARRELL    Phosphorus Replacement - Follow Nurse / BPA Driven Protocol, , Does not apply, LUIS ENRIQUE, Shaunna Lewis, APRN    Potassium Replacement - Follow Nurse / BPA Driven Protocol, , Does not apply, Joshua DUDLEY Shelly R, APRGIO    [COMPLETED] Insert  Peripheral IV, , , Once **AND** sodium chloride 0.9 % flush 10 mL, 10 mL, Intravenous, PRN, Myesha Dick PA-C    sodium chloride 0.9 % flush 10 mL, 10 mL, Intravenous, Q12H, Shaunna Lewis, APRN, 10 mL at 09/17/23 2003    sodium chloride 0.9 % flush 10 mL, 10 mL, Intravenous, PRN, Shaunna Lewis R, APRN    sodium chloride 0.9 % infusion 40 mL, 40 mL, Intravenous, PRN, Shaunna Lewis R, APRN    Allergies: She is allergic to green pepper, mushroom, and methadone.   FH: Her family history includes Heart failure in her mother; Stroke in her father and mother.   SH: She  reports that she quit smoking about 6 weeks ago. Her smoking use included cigarettes. She started smoking about 50 years ago. She has a 20.00 pack-year smoking history. She has never used smokeless tobacco. She reports that she does not drink alcohol and does not use drugs.     The patient's relevant past medical, surgical and social history were reviewed and updated in Epic as appropriate.    ROS (14):   Review of Systems   Respiratory:  Positive for shortness of breath.          Objective   OBJECTIVE     Physical Examination   Vitals:    09/18/23 0600 09/18/23 0724 09/18/23 0725 09/18/23 0726   BP:   151/82    BP Location:   Right arm    Patient Position:   Lying    Pulse:  72 68 65   Resp:  16 16    Temp:   97.7 °F (36.5 °C)    TempSrc:   Oral    SpO2:  98% 97% 97%   Weight: 38.1 kg (84 lb 1.6 oz)      Height:           Body mass index is 13.17 kg/m².    Physical Examination    Constitutional:  No acute distress.  Sleeping in bed on 6L nasal cannula.    Neck:  Normal range of motion. Neck supple.   No JVD present. No tracheal deviation present.  No thyromegaly present.    Cardiovascular: Normal rate, regular and rhythm. Normal heart sounds.  No murmurs, gallop or rub.  No peripheral edema.   Respiratory: No respiratory distress. Normal respiratory effort.  Diminished bilaterally. No wheezing.    Abdominal:  Soft. No masses. Nontender. No  distension. No HSM.   Extremities: No digital cyanosis. No clubbing.   Lymphadenopathy: None.   Skin: Skin is warm and dry. No rashes, lesions or ulcers noted.    Neurological:   Arouses to stimulation.    Psychiatric:  Normal affect. Normal behavior.     Diagnostic Data    Imaging Results (Last 24 Hours)       Procedure Component Value Units Date/Time    CT Head Without Contrast [[id number]] Collected: 09/17/23 1412     Updated: 09/17/23 1417    Narrative:      CT HEAD WO CONTRAST    Date of Exam: 9/17/2023 1:47 PM EDT    Indication: Syncope/presyncope, cerebrovascular cause suspected.    Comparison: None available.    Technique: Axial CT images were obtained of the head without contrast administration.  Automated exposure control and iterative construction methods were used.    FINDINGS: Gray-white differentiation is maintained and there is no evidence of intracranial hemorrhage, mass or mass effect. Age-related changes of the brain are present including volume loss and typical periventricular sequela of chronic small vessel   ischemia. There is otherwise no evidence of intracranial hemorrhage, mass or mass effect. The ventricles are normal in size and configuration accounting for surrounding volume loss. The orbits are normal and the paranasal sinuses are grossly clear. There   is a prominent chronic appearing left mastoid effusion in addition to soft tissue density present within both external auditory canals, possible cerumen.      Impression:      Age-related changes of the brain as above, otherwise without evidence of acute intracranial abnormality.      Electronically Signed: Broderick Reynoso MD    9/17/2023 2:14 PM EDT    Workstation ID: [id number]             Results for orders placed during the hospital encounter of 09/16/23    Adult Transthoracic Echo Complete With Contrast if Necessary Per Protocol    Interpretation Summary    Left ventricular systolic function is normal. Calculated left ventricular EF =  54.4%    Left ventricular diastolic function is consistent with (grade I) impaired relaxation.    The right ventricular cavity is mildly dilated.    The right atrial cavity is mildly  dilated.    Moderate tricuspid valve regurgitation is present.    Estimated right ventricular systolic pressure from tricuspid regurgitation is markedly elevated (>55 mmHg). Calculated right ventricular systolic pressure from tricuspid regurgitation is 76 mmHg.    Severe pulmonary hypertension is present.         Assessment & Plan   ASSESSMENT / PLAN     Assessment:  Ms. West is a 62yo F with a history of COPD on 2L home O2 and ongoing tobacco use who presented to the [facility] ED on 9/17/23 with worsening shortness of breath.     CTA of the chest in the ED was negative for PE. She was noted to have severe emphysema and small bilateral pleural effusions.     BNP was elevated at 7481.     She was admitted to Hospital Medicine and started on Doxycycline and Solumedrol for COPD exacerbation.     She does not follow with Pulmonary Medicine. We do not have any PFTs available to assess the severity of her COPD.     Since admission, she has continued to require 6L nasal cannula.     Echocardiogram is significant for HFpEF as well a RV dilation and RVSP of 76mmHg suggestive of Cor Pulmonale secondary to severe lung disease and diastolic dysfunction.     Active Hospital Problems    Diagnosis     **COPD with acute exacerbation     COPD (chronic obstructive pulmonary disease)     Hypokalemia     Elevated troponin     Syncope     Severe malnutrition     COPD exacerbation     Acute on chronic respiratory failure with hypoxia and hypercapnia     Chronic Pancreatitis         Plan:  Continue Doxycycline and IV Solumedrol.   Trial of Lasix 40mg IV x 2 to see if this improves oxygenation.   Wean supplemental oxygen as tolerated. Goal O2 saturation is 88-92%. I have weaned her O2 to 3L at bedside this AM.   Continue Symbicort and Spiriva for now. She may  benefit from changing to a nebulized regimen if she continues to have no significant improvement.   She will need to follow up in Pulmonary Clinic to obtain Full PFTs. We will not perform these as an inpatient while she is acutely ill.       Rebekah Simpson, DO  Pulmonary and Critical Care Medicine

## 2023-09-19 PROCEDURE — 94799 UNLISTED PULMONARY SVC/PX: CPT

## 2023-09-19 PROCEDURE — 25010000002 METHYLPREDNISOLONE PER 40 MG: Performed by: INTERNAL MEDICINE

## 2023-09-19 PROCEDURE — 94664 DEMO&/EVAL PT USE INHALER: CPT

## 2023-09-19 PROCEDURE — 25010000002 METHYLPREDNISOLONE PER 40 MG: Performed by: NURSE PRACTITIONER

## 2023-09-19 PROCEDURE — 25010000002 ENOXAPARIN PER 10 MG: Performed by: NURSE PRACTITIONER

## 2023-09-19 PROCEDURE — 94761 N-INVAS EAR/PLS OXIMETRY MLT: CPT

## 2023-09-19 PROCEDURE — 99232 SBSQ HOSP IP/OBS MODERATE 35: CPT | Performed by: INTERNAL MEDICINE

## 2023-09-19 RX ORDER — NAPROXEN SODIUM 220 MG
220 TABLET ORAL 2 TIMES DAILY PRN
COMMUNITY

## 2023-09-19 RX ORDER — PREDNISONE 10 MG/1
10 TABLET ORAL DAILY
Status: DISCONTINUED | OUTPATIENT
Start: 2023-09-26 | End: 2023-09-20 | Stop reason: HOSPADM

## 2023-09-19 RX ORDER — METHYLPREDNISOLONE SODIUM SUCCINATE 40 MG/ML
40 INJECTION, POWDER, LYOPHILIZED, FOR SOLUTION INTRAMUSCULAR; INTRAVENOUS EVERY 12 HOURS
Status: COMPLETED | OUTPATIENT
Start: 2023-09-19 | End: 2023-09-19

## 2023-09-19 RX ORDER — PREDNISONE 5 MG/1
5 TABLET ORAL DAILY
Status: DISCONTINUED | OUTPATIENT
Start: 2023-09-29 | End: 2023-09-20 | Stop reason: HOSPADM

## 2023-09-19 RX ORDER — METHYLPREDNISOLONE SODIUM SUCCINATE 40 MG/ML
40 INJECTION, POWDER, LYOPHILIZED, FOR SOLUTION INTRAMUSCULAR; INTRAVENOUS EVERY 12 HOURS
Status: DISCONTINUED | OUTPATIENT
Start: 2023-09-19 | End: 2023-09-19

## 2023-09-19 RX ORDER — PREDNISONE 20 MG/1
20 TABLET ORAL DAILY
Status: DISCONTINUED | OUTPATIENT
Start: 2023-09-20 | End: 2023-09-20 | Stop reason: HOSPADM

## 2023-09-19 RX ORDER — FUROSEMIDE 40 MG/1
40 TABLET ORAL DAILY
Status: DISCONTINUED | OUTPATIENT
Start: 2023-09-19 | End: 2023-09-20

## 2023-09-19 RX ADMIN — ALBUTEROL SULFATE 2.5 MG: 2.5 SOLUTION RESPIRATORY (INHALATION) at 11:03

## 2023-09-19 RX ADMIN — TIOTROPIUM BROMIDE INHALATION SPRAY 2 PUFF: 3.12 SPRAY, METERED RESPIRATORY (INHALATION) at 07:45

## 2023-09-19 RX ADMIN — DOXYCYCLINE 100 MG: 100 CAPSULE ORAL at 08:52

## 2023-09-19 RX ADMIN — BUDESONIDE AND FORMOTEROL FUMARATE DIHYDRATE 2 PUFF: 160; 4.5 AEROSOL RESPIRATORY (INHALATION) at 19:39

## 2023-09-19 RX ADMIN — Medication: at 21:37

## 2023-09-19 RX ADMIN — OXYCODONE HYDROCHLORIDE AND ACETAMINOPHEN 1 TABLET: 10; 325 TABLET ORAL at 21:35

## 2023-09-19 RX ADMIN — ALBUTEROL SULFATE 2.5 MG: 2.5 SOLUTION RESPIRATORY (INHALATION) at 15:30

## 2023-09-19 RX ADMIN — PANCRELIPASE 36000 UNITS OF LIPASE: 60000; 12000; 38000 CAPSULE, DELAYED RELEASE PELLETS ORAL at 18:10

## 2023-09-19 RX ADMIN — Medication 10 ML: at 09:00

## 2023-09-19 RX ADMIN — Medication 10 ML: at 21:41

## 2023-09-19 RX ADMIN — PANCRELIPASE 36000 UNITS OF LIPASE: 36000; 180000; 114000 CAPSULE, DELAYED RELEASE PELLETS ORAL at 12:18

## 2023-09-19 RX ADMIN — METHYLPREDNISOLONE SODIUM SUCCINATE 40 MG: 40 INJECTION, POWDER, LYOPHILIZED, FOR SOLUTION INTRAMUSCULAR; INTRAVENOUS at 01:28

## 2023-09-19 RX ADMIN — METHYLPREDNISOLONE SODIUM SUCCINATE 40 MG: 40 INJECTION, POWDER, LYOPHILIZED, FOR SOLUTION INTRAMUSCULAR; INTRAVENOUS at 21:35

## 2023-09-19 RX ADMIN — BUDESONIDE AND FORMOTEROL FUMARATE DIHYDRATE 2 PUFF: 160; 4.5 AEROSOL RESPIRATORY (INHALATION) at 07:45

## 2023-09-19 RX ADMIN — FUROSEMIDE 40 MG: 40 TABLET ORAL at 14:58

## 2023-09-19 RX ADMIN — METHYLPREDNISOLONE SODIUM SUCCINATE 40 MG: 40 INJECTION, POWDER, LYOPHILIZED, FOR SOLUTION INTRAMUSCULAR; INTRAVENOUS at 08:51

## 2023-09-19 RX ADMIN — ENOXAPARIN SODIUM 30 MG: 30 INJECTION SUBCUTANEOUS at 08:51

## 2023-09-19 RX ADMIN — DOXYCYCLINE 100 MG: 100 CAPSULE ORAL at 21:34

## 2023-09-19 RX ADMIN — MULTIPLE VITAMINS W/ MINERALS TAB 1 TABLET: TAB at 08:51

## 2023-09-19 RX ADMIN — ALBUTEROL SULFATE 2.5 MG: 2.5 SOLUTION RESPIRATORY (INHALATION) at 07:45

## 2023-09-19 RX ADMIN — ALBUTEROL SULFATE 2.5 MG: 2.5 SOLUTION RESPIRATORY (INHALATION) at 19:39

## 2023-09-19 NOTE — PROGRESS NOTES
NN spoke with patient at .  Patient alert and able to answer questions appropriately.  O2 sat 94% on 4L, home O2 use 2-4L per CM notes.  COPD educations reviewed.  Home O2 use and safety reviewed.  Patient states she uses large tanks in her home during the day and only uses the concentrator at night.  She does not use oxygen when outside the home.  Encouraged patient to use the concentrator anytime at home and save her tanks for when she leaves the home.  Will contact CM to see if smaller tanks can be obtained.  No other questions or concerns at this time.  NN continues to follow.        Per current GOLD Standards, please consider: LAMA/LABA/ICS in place (Trelegy), Outpatient PFT, Rehab as appropriate, Palliative Care consult, NRT at [place], Annual LDCT per current screening guidelines (age 50-80 years old, smoking history of 20 pack years or more or has quit within past 15 years)     Patient has been scheduled with [facility] for 10/4/2023 @ 9 am with RAFA Ayoub to establish care.

## 2023-09-19 NOTE — PROGRESS NOTES
[facility] Medicine Services  PROGRESS NOTE    Patient Name: Mary West  : 1960  MRN: [id number]    Date of Admission: 2023  Primary Care Physician: Provider, No Known    Subjective   Subjective     CC:  F/U AE COPD    HPI:  Patient seen and examined. Sitting up in bed. No new complaints. Hopeful to go home soon.    Objective   Objective     Vital Signs:   Temp:  [97.7 °F (36.5 °C)-98.8 °F (37.1 °C)] 98.1 °F (36.7 °C)  Heart Rate:  [58-85] 80  Resp:  [16-20] 18  BP: (117-151)/(62-83) 117/77  Flow (L/min):  [3.5-4] 3.5     Physical Exam:  Constitutional: No acute distress, awake, alert, appears older than stated age  HENT: NCAT, mucous membranes moist  Respiratory: Decreased in bases, respiratory effort normal, exp wheezes, stable on 3.5L NC  Cardiovascular: RRR, no murmurs, rubs, or gallops  Gastrointestinal: Positive bowel sounds, soft, nontender, nondistended  Musculoskeletal: No bilateral ankle edema  Psychiatric: Appropriate affect, cooperative  Neurologic: Oriented x 3, GOODWIN, speech clear  Skin: No rashes     Results Reviewed:  LAB RESULTS:      Lab 23   WBC 9.97 4.86 8.27   HEMOGLOBIN 13.7 14.2 13.6   HEMATOCRIT 42.2 44.0 42.6   PLATELETS 215 225 232   NEUTROS ABS  --  4.16 5.48   IMMATURE GRANS (ABS)  --  0.01 0.03   LYMPHS ABS  --  0.59* 2.10   MONOS ABS  --  0.09* 0.51   EOS ABS  --  0.00 0.10   .4* 103.0* 103.6*   CRP  --   --  <0.30   PROCALCITONIN  --   --  0.04   LACTATE  --   --  2.0   LDH  --   --  244*   D DIMER QUANT  --   --  1.11*         Lab 23  0823   SODIUM 142 144 146*   POTASSIUM 5.2 4.3 3.3*   CHLORIDE 106 106 106   CO2 29.0 28.0 30.0*   ANION GAP 7.0 10.0 10.0   BUN 20 15 13   CREATININE 0.46* 0.68 0.55*   EGFR 107.7 98.0 103.1   GLUCOSE 170* 197* 93   CALCIUM 8.9 8.8 9.2   MAGNESIUM  --  1.7  --    PHOSPHORUS  --  3.4  --          Lab 23   TOTAL  PROTEIN 6.3   ALBUMIN 4.1   GLOBULIN 2.2   ALT (SGPT) 10   AST (SGOT) 22   BILIRUBIN 0.6   ALK PHOS 82         Lab 09/17/23  1003 09/17/23  0801 09/17/23  0029 09/16/23 2010   PROBNP  --   --   --  7,481.0*   HSTROP T 13* 14* 20* 24*             Lab 09/18/23  1204   FOLATE 11.70   VITAMIN B 12 677         Lab 09/16/23 2039   PH, ARTERIAL 7.435   PCO2, ARTERIAL 45.5*   PO2 ART 71.4*   FIO2 44   HCO3 ART 30.6*   BASE EXCESS ART 5.5*   CARBOXYHEMOGLOBIN 3.9*     Brief Urine Lab Results       None            Microbiology Results Abnormal       Procedure Component Value - Date/Time    COVID PRE-OP / PRE-PROCEDURE SCREENING ORDER (NO ISOLATION) - Swab, Nasopharynx [[id number]]  (Normal) Collected: 09/16/23 2021    Lab Status: Final result Specimen: Swab from Nasopharynx Updated: 09/16/23 2119    Narrative:      The following orders were created for panel order COVID PRE-OP / PRE-PROCEDURE SCREENING ORDER (NO ISOLATION) - Swab, Nasopharynx.  Procedure                               Abnormality         Status                     ---------                               -----------         ------                     Respiratory Panel PCR w/...[[id number]]  Normal              Final result                 Please view results for these tests on the individual orders.    Respiratory Panel PCR w/COVID-19(SARS-CoV-2) JONAS/RINA/NAFISA/PAD/COR/MAD/SALOME In-House, NP Swab in UTM/VTM, 3-4 HR TAT - Swab, Nasopharynx [[id number]]  (Normal) Collected: 09/16/23 2021    Lab Status: Final result Specimen: Swab from Nasopharynx Updated: 09/16/23 2119     ADENOVIRUS, PCR Not Detected     Coronavirus 229E Not Detected     Coronavirus HKU1 Not Detected     Coronavirus NL63 Not Detected     Coronavirus OC43 Not Detected     COVID19 Not Detected     Human Metapneumovirus Not Detected     Human Rhinovirus/Enterovirus Not Detected     Influenza A PCR Not Detected     Influenza B PCR Not Detected     Parainfluenza Virus 1 Not Detected     Parainfluenza Virus  2 Not Detected     Parainfluenza Virus 3 Not Detected     Parainfluenza Virus 4 Not Detected     RSV, PCR Not Detected     Bordetella pertussis pcr Not Detected     Bordetella parapertussis PCR Not Detected     Chlamydophila pneumoniae PCR Not Detected     Mycoplasma pneumo by PCR Not Detected    Narrative:      In the setting of a positive respiratory panel with a viral infection PLUS a negative procalcitonin without other underlying concern for bacterial infection, consider observing off antibiotics or discontinuation of antibiotics and continue supportive care. If the respiratory panel is positive for atypical bacterial infection (Bordetella pertussis, Chlamydophila pneumoniae, or Mycoplasma pneumoniae), consider antibiotic de-escalation to target atypical bacterial infection.            CT Head Without Contrast    Result Date: 9/17/2023  CT HEAD WO CONTRAST Date of Exam: 9/17/2023 1:47 PM EDT Indication: Syncope/presyncope, cerebrovascular cause suspected. Comparison: None available. Technique: Axial CT images were obtained of the head without contrast administration.  Automated exposure control and iterative construction methods were used. FINDINGS: Gray-white differentiation is maintained and there is no evidence of intracranial hemorrhage, mass or mass effect. Age-related changes of the brain are present including volume loss and typical periventricular sequela of chronic small vessel ischemia. There is otherwise no evidence of intracranial hemorrhage, mass or mass effect. The ventricles are normal in size and configuration accounting for surrounding volume loss. The orbits are normal and the paranasal sinuses are grossly clear. There  is a prominent chronic appearing left mastoid effusion in addition to soft tissue density present within both external auditory canals, possible cerumen.     Impression: Age-related changes of the brain as above, otherwise without evidence of acute intracranial abnormality.  Electronically Signed: Broderick Reynoso MD  9/17/2023 2:14 PM EDT  Workstation ID: [id number]     Results for orders placed during the hospital encounter of 09/16/23    Adult Transthoracic Echo Complete With Contrast if Necessary Per Protocol    Interpretation Summary    Left ventricular systolic function is normal. Calculated left ventricular EF = 54.4%    Left ventricular diastolic function is consistent with (grade I) impaired relaxation.    The right ventricular cavity is mildly dilated.    The right atrial cavity is mildly  dilated.    Moderate tricuspid valve regurgitation is present.    Estimated right ventricular systolic pressure from tricuspid regurgitation is markedly elevated (>55 mmHg). Calculated right ventricular systolic pressure from tricuspid regurgitation is 76 mmHg.    Severe pulmonary hypertension is present.      Current medications:  Scheduled Meds:Pharmacy Consult, , Does not apply, Q12H  Albuterol Sulfate NEB Orderable, 2.5 mg, Nebulization, 4x Daily - RT  budesonide-formoterol, 2 puff, Inhalation, BID - RT   And  tiotropium bromide monohydrate, 2 puff, Inhalation, Daily - RT  doxycycline, 100 mg, Oral, Q12H  enoxaparin, 30 mg, Subcutaneous, Daily  methylPREDNISolone sodium succinate, 40 mg, Intravenous, Q8H  multivitamin with minerals, 1 tablet, Oral, Daily  Pancrelipase (Lip-Prot-Amyl), 36,000 units of lipase, Oral, TID With Meals  pharmacy consult - [facility], , Does not apply, Daily  senna-docusate sodium, 2 tablet, Oral, BID  sodium chloride, 10 mL, Intravenous, Q12H      Continuous Infusions:   PRN Meds:.  acetaminophen **OR** acetaminophen **OR** acetaminophen    senna-docusate sodium **AND** polyethylene glycol **AND** bisacodyl **AND** bisacodyl    calcium carbonate    Calcium Replacement - Follow Nurse / BPA Driven Protocol    ipratropium-albuterol    Magnesium Standard Dose Replacement - Follow Nurse / BPA Driven Protocol    oxyCODONE-acetaminophen    Phosphorus Replacement - Follow Nurse /  BPA Driven Protocol    Potassium Replacement - Follow Nurse / BPA Driven Protocol    [COMPLETED] Insert Peripheral IV **AND** sodium chloride    sodium chloride    sodium chloride    Assessment & Plan   Assessment & Plan     Active Hospital Problems    Diagnosis  POA    **COPD with acute exacerbation [J44.1]  Yes    COPD (chronic obstructive pulmonary disease) [J44.9]  Yes    Hypokalemia [E87.6]  Yes    Elevated troponin [R77.8]  Yes    Syncope [R55]  Yes    Severe malnutrition [E43]  Yes    COPD exacerbation [J44.1]  Yes    Acute on chronic respiratory failure with hypoxia and hypercapnia [J96.21, J96.22]  Yes    Chronic Pancreatitis [K85.90]  Yes      Resolved Hospital Problems   No resolved problems to display.        Brief Hospital Course to date:  Mary West is a 63 y.o. female with PMH significant for COPD with chronic respiratory failure on 4 LNC, chronic pancreatitis, malnutrition, who presents to the ED with complaint of shortness of breath and syncope who was found to have concern for COPD with acute exacerbation.    This patient's problems and plans were partially entered by my partner and updated as appropriate by me 09/19/23.      Acute on chronic respiratory failure with hypercapnia and hypoxia  COPD exacerbation  Pulmonary HTN   HFpEF   - Baseline supplemental oxygen 2-4L NC  - Scheduled and as needed DuoNebs  - Continue Symbicort/Spiriva   - Wean Solu-Medrol to 40mg IV q12  - COPD navigator follows   - Pulmonology following, appreciate assistance   - Continue Doxycycline  - Will need a nebulizer machine for home  - S/P Lasix 40mg IV q12 x 2 doses   -Echo with EF 54.5%, grade 1 diastolic dysfunction, mod TVR, RVSP>55, severe pulmonary HTN     Syncope  - Orthostatic BP negative   - Likely related to hypoxia  - Echo as above  - Fall precautions  - CT head negative     Elevated troponin  Elevated proBNP  - Troponin trending down  - Denies chest pain  - Echo as above  -- S/P IV Lasix x2 doses      Hypokalemia  - Electrolyte replacement per protocol      Chronic pancreatitis  - Continue Creon     Severe malnutrition  - RD following      Tobacco abuse  - Reports longer smoking, but now using vapes  - Encouraged cessation    Macrocytosis   -B12, Folate ok     Expected Discharge Location and Transportation: Home  Expected Discharge   Expected Discharge Date: 9/20/2023; Expected Discharge Time:      DVT prophylaxis:  Medical DVT prophylaxis orders are present.     AM-PAC 6 Clicks Score (PT): 22 (09/19/23 0900)    CODE STATUS:   Code Status and Medical Interventions:   Ordered at: 09/17/23 0159     Level Of Support Discussed With:    Patient     Code Status (Patient has no pulse and is not breathing):    CPR (Attempt to Resuscitate)     Medical Interventions (Patient has pulse or is breathing):    Full Support       Monica Gupta, DO  09/19/23

## 2023-09-19 NOTE — PROGRESS NOTES
INPATIENT PULMONARY SERVICE  PROGRESS NOTE     Hospital LOS: 2 days    Ms. Mary West, is followed for a Chief Complaint of:  Chief Complaint   Patient presents with    Shortness of Breath       COPD with acute exacerbation    Acute on chronic respiratory failure with hypoxia and hypercapnia    Chronic Pancreatitis    COPD exacerbation    Severe malnutrition    COPD (chronic obstructive pulmonary disease)    Hypokalemia    Elevated troponin    Syncope      Subjective   S     Interval History:  No acute events. On 4L nasal cannula. Desaturation with exertion.        The patient's relevant past medical, surgical and social history were reviewed and updated in Epic as appropriate.      ROS:   Constitutional: Negative for fever.   Respiratory: Positive for dyspnea.   Cardiovascular: Negative for chest pain.   Gastrointestinal: Negative for  nausea, vomiting and diarrhea.     Objective   O     Vitals  Temp  Min: 97.7 °F (36.5 °C)  Max: 98.8 °F (37.1 °C)  BP  Min: 117/77  Max: 151/81  Pulse  Min: 58  Max: 85  Resp  Min: 16  Max: 20  SpO2  Min: 91 %  Max: 96 % Flow (L/min)  Min: 3.5  Max: 4    I/O 24 HR (7:00 AM-6:59AM):  Intake/Output       None            Medications (Drips):       Physical Examination    Telemetry: Normal sinus rhythm.    Constitutional:  No acute distress.  Conversant. Sitting up in bed on nasal cannula.    Cardiovascular: Regular rate and rhythm.  No murmurs, rub or gallop.   Respiratory: Normal symmetric chest expansion.  Normal respiratory effort.  Diminished bilaterally. No wheezing.    Abdominal:  Soft. No masses.   Non-tender. No distension.   No hepatosplenomegaly.   Extremities: No digital cyanosis or clubbing.  No peripheral edema.   Neurological:   Alert and Oriented to person, place, and time.   Moves all extremities.           Results from last 7 days   Lab Units 09/18/23  0326 09/17/23  0801 09/16/23 2010   WBC 10*3/mm3 9.97 4.86 8.27   HEMOGLOBIN g/dL 13.7 14.2 13.6   MCV fL 103.4*  103.0* 103.6*   PLATELETS 10*3/mm3 215 225 232     Results from last 7 days   Lab Units 09/18/23  0326 09/17/23  0801 09/16/23 2010   SODIUM mmol/L 142 144 146*   POTASSIUM mmol/L 5.2 4.3 3.3*   CO2 mmol/L 29.0 28.0 30.0*   CREATININE mg/dL 0.46* 0.68 0.55*   MAGNESIUM mg/dL  --  1.7  --    PHOSPHORUS mg/dL  --  3.4  --      Serum creatinine: 0.46 mg/dL (L) 09/18/23 0326  Estimated creatinine clearance: 75.3 mL/min (A)  Results from last 7 days   Lab Units 09/16/23 2010   ALK PHOS U/L 82   BILIRUBIN mg/dL 0.6   ALT (SGPT) U/L 10   AST (SGOT) U/L 22       Results from last 7 days   Lab Units 09/16/23  2039   PH, ARTERIAL pH units 7.435   PCO2, ARTERIAL mm Hg 45.5*   PO2 ART mm Hg 71.4*   FIO2 % 44       Images:     Imaging Results (Last 24 Hours)       ** No results found for the last 24 hours. **            I reviewed the patient's new clinical results.  I reviewed the patient's new imaging results and agree with the interpretation.    Assessment & Plan   A / P     Ms. West is a 64yo F with a history of COPD on 2L home O2 and ongoing tobacco use who presented to the [facility] ED on 9/17/23 with worsening shortness of breath.      CTA of the chest in the ED was negative for PE. She was noted to have severe emphysema and small bilateral pleural effusions.      BNP was elevated at 7481.      She was admitted to Hospital Medicine and started on Doxycycline and Solumedrol for COPD exacerbation.      She does not follow with Pulmonary Medicine. We do not have any PFTs available to assess the severity of her COPD.      Since admission, she has continued to require 6L nasal cannula.      Echocardiogram is significant for HFpEF as well a RV dilation and RVSP of 76mmHg suggestive of Cor Pulmonale secondary to severe lung disease and diastolic dysfunction.     Diet: Regular/House Diet; Texture: Regular Texture (IDDSI 7); Fluid Consistency: Thin (IDDSI 0)  Code Status and Medical Interventions:   Ordered at: 09/17/23 0159      Level Of Support Discussed With:    Patient     Code Status (Patient has no pulse and is not breathing):    CPR (Attempt to Resuscitate)     Medical Interventions (Patient has pulse or is breathing):    Full Support       Active Hospital Problems    Diagnosis     **COPD with acute exacerbation     COPD (chronic obstructive pulmonary disease)     Hypokalemia     Elevated troponin     Syncope     Severe malnutrition     COPD exacerbation     Acute on chronic respiratory failure with hypoxia and hypercapnia     Chronic Pancreatitis        Assessment / Plan:  Convert Solumedrol to Prednisone with taper.   Lasix 40mg PO daily.   Wean supplemental oxygen as tolerated.   Complete a course of Doxycycline.   Continue Symbicort and Spiriva for now. She may benefit from changing to a nebulized regimen if she continues to have no significant improvement.   She will need to follow up in Pulmonary Clinic to obtain Full PFTs. We will not perform these as an inpatient while she is acutely ill.     I discussed the patient's findings and my recommendations with patient      Rebekah Simpson, DO  Pulmonary and Critical Care Medicine

## 2023-09-20 ENCOUNTER — READMISSION MANAGEMENT (OUTPATIENT)
Dept: CALL CENTER | Facility: HOSPITAL | Age: 63
End: 2023-09-20
Payer: MEDICARE

## 2023-09-20 VITALS
HEIGHT: 67 IN | TEMPERATURE: 97.7 F | DIASTOLIC BLOOD PRESSURE: 96 MMHG | OXYGEN SATURATION: 93 % | BODY MASS INDEX: 12.71 KG/M2 | WEIGHT: 81 LBS | HEART RATE: 76 BPM | SYSTOLIC BLOOD PRESSURE: 131 MMHG | RESPIRATION RATE: 18 BRPM

## 2023-09-20 LAB
ANION GAP SERPL CALCULATED.3IONS-SCNC: 8 MMOL/L (ref 5–15)
BUN SERPL-MCNC: 25 MG/DL (ref 8–23)
BUN/CREAT SERPL: 51 (ref 7–25)
CALCIUM SPEC-SCNC: 9.3 MG/DL (ref 8.6–10.5)
CHLORIDE SERPL-SCNC: 99 MMOL/L (ref 98–107)
CO2 SERPL-SCNC: 31 MMOL/L (ref 22–29)
CREAT SERPL-MCNC: 0.49 MG/DL (ref 0.57–1)
DEPRECATED RDW RBC AUTO: 51.8 FL (ref 37–54)
EGFRCR SERPLBLD CKD-EPI 2021: 106.1 ML/MIN/1.73
ERYTHROCYTE [DISTWIDTH] IN BLOOD BY AUTOMATED COUNT: 13.7 % (ref 12.3–15.4)
GLUCOSE SERPL-MCNC: 180 MG/DL (ref 65–99)
HCT VFR BLD AUTO: 45.4 % (ref 34–46.6)
HGB BLD-MCNC: 15 G/DL (ref 12–15.9)
MCH RBC QN AUTO: 33.6 PG (ref 26.6–33)
MCHC RBC AUTO-ENTMCNC: 33 G/DL (ref 31.5–35.7)
MCV RBC AUTO: 101.6 FL (ref 79–97)
PLATELET # BLD AUTO: 248 10*3/MM3 (ref 140–450)
PMV BLD AUTO: 10.1 FL (ref 6–12)
POTASSIUM SERPL-SCNC: 4.5 MMOL/L (ref 3.5–5.2)
RBC # BLD AUTO: 4.47 10*6/MM3 (ref 3.77–5.28)
SODIUM SERPL-SCNC: 138 MMOL/L (ref 136–145)
WBC NRBC COR # BLD: 10.08 10*3/MM3 (ref 3.4–10.8)

## 2023-09-20 PROCEDURE — 94664 DEMO&/EVAL PT USE INHALER: CPT

## 2023-09-20 PROCEDURE — 63710000001 PREDNISONE PER 1 MG: Performed by: INTERNAL MEDICINE

## 2023-09-20 PROCEDURE — 97535 SELF CARE MNGMENT TRAINING: CPT | Performed by: OCCUPATIONAL THERAPIST

## 2023-09-20 PROCEDURE — 94799 UNLISTED PULMONARY SVC/PX: CPT

## 2023-09-20 PROCEDURE — 25010000002 ENOXAPARIN PER 10 MG: Performed by: NURSE PRACTITIONER

## 2023-09-20 PROCEDURE — 99232 SBSQ HOSP IP/OBS MODERATE 35: CPT | Performed by: INTERNAL MEDICINE

## 2023-09-20 PROCEDURE — 85027 COMPLETE CBC AUTOMATED: CPT | Performed by: FAMILY MEDICINE

## 2023-09-20 PROCEDURE — 80048 BASIC METABOLIC PNL TOTAL CA: CPT | Performed by: FAMILY MEDICINE

## 2023-09-20 PROCEDURE — 94761 N-INVAS EAR/PLS OXIMETRY MLT: CPT

## 2023-09-20 PROCEDURE — 97530 THERAPEUTIC ACTIVITIES: CPT | Performed by: OCCUPATIONAL THERAPIST

## 2023-09-20 PROCEDURE — 97530 THERAPEUTIC ACTIVITIES: CPT

## 2023-09-20 RX ORDER — DOXYCYCLINE 100 MG/1
100 CAPSULE ORAL EVERY 12 HOURS SCHEDULED
Qty: 3 CAPSULE | Refills: 0 | Status: SHIPPED | OUTPATIENT
Start: 2023-09-20 | End: 2023-09-22

## 2023-09-20 RX ORDER — PREDNISONE 5 MG/1
15 TABLET ORAL DAILY
Qty: 9 TABLET | Refills: 0 | Status: SHIPPED | OUTPATIENT
Start: 2023-09-23 | End: 2023-09-20 | Stop reason: SDUPTHER

## 2023-09-20 RX ORDER — PREDNISONE 10 MG/1
TABLET ORAL
Qty: 13 TABLET | Refills: 0 | Status: SHIPPED | OUTPATIENT
Start: 2023-09-26 | End: 2023-10-07

## 2023-09-20 RX ORDER — PREDNISONE 5 MG/1
5 TABLET ORAL DAILY
Qty: 3 TABLET | Refills: 0 | Status: SHIPPED | OUTPATIENT
Start: 2023-09-29 | End: 2023-09-20 | Stop reason: SDUPTHER

## 2023-09-20 RX ORDER — PREDNISONE 20 MG/1
20 TABLET ORAL DAILY
Qty: 2 TABLET | Refills: 0 | Status: SHIPPED | OUTPATIENT
Start: 2023-09-21 | End: 2023-09-20 | Stop reason: SDUPTHER

## 2023-09-20 RX ORDER — FUROSEMIDE 20 MG/1
20 TABLET ORAL DAILY
Status: DISCONTINUED | OUTPATIENT
Start: 2023-09-20 | End: 2023-09-20 | Stop reason: HOSPADM

## 2023-09-20 RX ADMIN — Medication 10 ML: at 08:45

## 2023-09-20 RX ADMIN — ENOXAPARIN SODIUM 30 MG: 30 INJECTION SUBCUTANEOUS at 08:45

## 2023-09-20 RX ADMIN — PANCRELIPASE 36000 UNITS OF LIPASE: 60000; 12000; 38000 CAPSULE, DELAYED RELEASE PELLETS ORAL at 08:45

## 2023-09-20 RX ADMIN — MULTIPLE VITAMINS W/ MINERALS TAB 1 TABLET: TAB at 08:44

## 2023-09-20 RX ADMIN — ALBUTEROL SULFATE 2.5 MG: 2.5 SOLUTION RESPIRATORY (INHALATION) at 07:57

## 2023-09-20 RX ADMIN — ALBUTEROL SULFATE 2.5 MG: 2.5 SOLUTION RESPIRATORY (INHALATION) at 11:17

## 2023-09-20 RX ADMIN — FUROSEMIDE 20 MG: 40 TABLET ORAL at 08:44

## 2023-09-20 RX ADMIN — PREDNISONE 20 MG: 20 TABLET ORAL at 08:44

## 2023-09-20 RX ADMIN — TIOTROPIUM BROMIDE INHALATION SPRAY 2 PUFF: 3.12 SPRAY, METERED RESPIRATORY (INHALATION) at 08:00

## 2023-09-20 RX ADMIN — Medication: at 08:46

## 2023-09-20 RX ADMIN — PANCRELIPASE 36000 UNITS OF LIPASE: 60000; 12000; 38000 CAPSULE, DELAYED RELEASE PELLETS ORAL at 12:48

## 2023-09-20 RX ADMIN — DOXYCYCLINE 100 MG: 100 CAPSULE ORAL at 08:44

## 2023-09-20 RX ADMIN — BUDESONIDE AND FORMOTEROL FUMARATE DIHYDRATE 2 PUFF: 160; 4.5 AEROSOL RESPIRATORY (INHALATION) at 07:58

## 2023-09-20 NOTE — DISCHARGE SUMMARY
[facility] Medicine Services  DISCHARGE SUMMARY    Patient Name: Mary West  : 1960  MRN: [id number]    Date of Admission: 2023  7:41 PM  Date of Discharge:  2023  Primary Care Physician: Provider, No Known    Consults       Date and Time Order Name Status Description    2023  7:42 AM Inpatient Pulmonology Consult Completed             Hospital Course     Presenting Problem:     Active Hospital Problems    Diagnosis  POA    **COPD with acute exacerbation [J44.1]  Yes    COPD (chronic obstructive pulmonary disease) [J44.9]  Yes    Hypokalemia [E87.6]  Yes    Elevated troponin [R77.8]  Yes    Syncope [R55]  Yes    Severe malnutrition [E43]  Yes    COPD exacerbation [J44.1]  Yes    Acute on chronic respiratory failure with hypoxia and hypercapnia [J96.21, J96.22]  Yes    Chronic Pancreatitis [K85.90]  Yes      Resolved Hospital Problems   No resolved problems to display.      -----------------------final diagnoses------------------  Acute on chronic mixed rep failure (hypercapnea and hypoxia)  COPD w/ acute exacerbation  Chronic HFpEF  Pulm HTN  Syncope, othostasis  Elevated troponin due to supply-demand ischemia (not acs)  Chronic pancreatitis  Severe protein malnutrition  ---------------------------------------------------------------    Hospital Course:  Mary West is a 63 y.o. female w/ hx copd, chronic resp failure (on 4Lnc chronically) who prsented w/ dyspnea and syncope spell. Treated for copd exacerbation w/ bronchodilators and steroids, doxy, also diuresed treated for chf exacerbation. Followed by pulmonary. Improved clinically and cleared for discharge home by pulmonary. Echo was normal. Syncope was felt likely due to orthostatic hypotension, this also seemed to resolve during hospital stay      Discharge Follow Up Recommendations for outpatient labs/diagnostics:  Pcp 1 week  Pulmonary 10/    Day of Discharge     HPI:   Respiratory status dramatically  improved, wants to go home    Review of Systems  No cp  No palpitations    Vital Signs:   Temp:  [97.7 °F (36.5 °C)-98.5 °F (36.9 °C)] 97.7 °F (36.5 °C)  Heart Rate:  [64-88] 76  Resp:  [16-18] 18  BP: (106-136)/(76-96) 131/96  Flow (L/min):  [3-4] 4      Physical Exam:  Alert, nontoxic  Normal work of breathing, nasal canula in place  Prolonged exp phase but no exp wheezes, normal resp effort  Rrr  Abd soft, nontender  No cce    Pertinent  and/or Most Recent Results     LAB RESULTS:      Lab 09/20/23 0456 09/18/23 0326 09/17/23  0801 09/16/23 2010   WBC 10.08 9.97 4.86 8.27   HEMOGLOBIN 15.0 13.7 14.2 13.6   HEMATOCRIT 45.4 42.2 44.0 42.6   PLATELETS 248 215 225 232   NEUTROS ABS  --   --  4.16 5.48   IMMATURE GRANS (ABS)  --   --  0.01 0.03   LYMPHS ABS  --   --  0.59* 2.10   MONOS ABS  --   --  0.09* 0.51   EOS ABS  --   --  0.00 0.10   .6* 103.4* 103.0* 103.6*   CRP  --   --   --  <0.30   PROCALCITONIN  --   --   --  0.04   LACTATE  --   --   --  2.0   LDH  --   --   --  244*   D DIMER QUANT  --   --   --  1.11*         Lab 09/20/23  0456 09/18/23 0326 09/17/23  0801 09/16/23 2010   SODIUM 138 142 144 146*   POTASSIUM 4.5 5.2 4.3 3.3*   CHLORIDE 99 106 106 106   CO2 31.0* 29.0 28.0 30.0*   ANION GAP 8.0 7.0 10.0 10.0   BUN 25* 20 15 13   CREATININE 0.49* 0.46* 0.68 0.55*   EGFR 106.1 107.7 98.0 103.1   GLUCOSE 180* 170* 197* 93   CALCIUM 9.3 8.9 8.8 9.2   MAGNESIUM  --   --  1.7  --    PHOSPHORUS  --   --  3.4  --          Lab 09/16/23 2010   TOTAL PROTEIN 6.3   ALBUMIN 4.1   GLOBULIN 2.2   ALT (SGPT) 10   AST (SGOT) 22   BILIRUBIN 0.6   ALK PHOS 82         Lab 09/17/23  1003 09/17/23  0801 09/17/23  0029 09/16/23 2010   PROBNP  --   --   --  7,481.0*   HSTROP T 13* 14* 20* 24*             Lab 09/18/23  1204   FOLATE 11.70   VITAMIN B 12 677         Lab 09/16/23 2039   PH, ARTERIAL 7.435   PCO2, ARTERIAL 45.5*   PO2 ART 71.4*   FIO2 44   HCO3 ART 30.6*   BASE EXCESS ART 5.5*   CARBOXYHEMOGLOBIN  3.9*     Brief Urine Lab Results       None          Microbiology Results (last 10 days)       Procedure Component Value - Date/Time    COVID PRE-OP / PRE-PROCEDURE SCREENING ORDER (NO ISOLATION) - Swab, Nasopharynx [[id number]]  (Normal) Collected: 09/16/23 2021    Lab Status: Final result Specimen: Swab from Nasopharynx Updated: 09/16/23 2119    Narrative:      The following orders were created for panel order COVID PRE-OP / PRE-PROCEDURE SCREENING ORDER (NO ISOLATION) - Swab, Nasopharynx.  Procedure                               Abnormality         Status                     ---------                               -----------         ------                     Respiratory Panel PCR w/...[[id number]]  Normal              Final result                 Please view results for these tests on the individual orders.    Respiratory Panel PCR w/COVID-19(SARS-CoV-2) JONAS/[facility]/NAFISA/PAD/COR/MAD/SALOME In-House, NP Swab in UTM/VTM, 3-4 HR TAT - Swab, Nasopharynx [[id number]]  (Normal) Collected: 09/16/23 2021    Lab Status: Final result Specimen: Swab from Nasopharynx Updated: 09/16/23 2119     ADENOVIRUS, PCR Not Detected     Coronavirus 229E Not Detected     Coronavirus HKU1 Not Detected     Coronavirus NL63 Not Detected     Coronavirus OC43 Not Detected     COVID19 Not Detected     Human Metapneumovirus Not Detected     Human Rhinovirus/Enterovirus Not Detected     Influenza A PCR Not Detected     Influenza B PCR Not Detected     Parainfluenza Virus 1 Not Detected     Parainfluenza Virus 2 Not Detected     Parainfluenza Virus 3 Not Detected     Parainfluenza Virus 4 Not Detected     RSV, PCR Not Detected     Bordetella pertussis pcr Not Detected     Bordetella parapertussis PCR Not Detected     Chlamydophila pneumoniae PCR Not Detected     Mycoplasma pneumo by PCR Not Detected    Narrative:      In the setting of a positive respiratory panel with a viral infection PLUS a negative procalcitonin without other underlying concern for  bacterial infection, consider observing off antibiotics or discontinuation of antibiotics and continue supportive care. If the respiratory panel is positive for atypical bacterial infection (Bordetella pertussis, Chlamydophila pneumoniae, or Mycoplasma pneumoniae), consider antibiotic de-escalation to target atypical bacterial infection.            Adult Transthoracic Echo Complete With Contrast if Necessary Per Protocol    Result Date: 9/17/2023    Left ventricular systolic function is normal. Calculated left ventricular EF = 54.4%   Left ventricular diastolic function is consistent with (grade I) impaired relaxation.   The right ventricular cavity is mildly dilated.   The right atrial cavity is mildly  dilated.   Moderate tricuspid valve regurgitation is present.   Estimated right ventricular systolic pressure from tricuspid regurgitation is markedly elevated (>55 mmHg). Calculated right ventricular systolic pressure from tricuspid regurgitation is 76 mmHg.   Severe pulmonary hypertension is present.     CT Head Without Contrast    Result Date: 9/17/2023  CT HEAD WO CONTRAST Date of Exam: 9/17/2023 1:47 PM EDT Indication: Syncope/presyncope, cerebrovascular cause suspected. Comparison: None available. Technique: Axial CT images were obtained of the head without contrast administration.  Automated exposure control and iterative construction methods were used. FINDINGS: Gray-white differentiation is maintained and there is no evidence of intracranial hemorrhage, mass or mass effect. Age-related changes of the brain are present including volume loss and typical periventricular sequela of chronic small vessel ischemia. There is otherwise no evidence of intracranial hemorrhage, mass or mass effect. The ventricles are normal in size and configuration accounting for surrounding volume loss. The orbits are normal and the paranasal sinuses are grossly clear. There  is a prominent chronic appearing left mastoid effusion in  addition to soft tissue density present within both external auditory canals, possible cerumen.     Age-related changes of the brain as above, otherwise without evidence of acute intracranial abnormality. Electronically Signed: Broderick Reynoso MD  9/17/2023 2:14 PM EDT  Workstation ID: [id number]    XR Chest 1 View    Result Date: 9/16/2023  XR CHEST 1 VW Date of Exam: 9/16/2023 8:40 PM EDT Indication: shortness of breath Comparison: Chest CT 7/17/2023 and chest radiograph same date. Findings: There is diffuse interstitial prominence and there are cystic areas of lucency in both lungs. There are stable small bilateral pleural effusions. There is improved left midlung airspace disease. No definite new focal lung opacity. No pneumothorax. Heart size is unchanged. Bones are demineralized. No definite acute osseous abnormality. Stable mild lower thoracic compression deformity.     Impression: 1.Improved left midlung airspace disease. 2.Stable small bilateral pleural effusions. 3.Stable chronic changes in the lungs. Electronically Signed: Bishop Anand MD  9/16/2023 9:17 PM EDT  Workstation ID: [id number]    CT Angiogram Chest    Result Date: 9/16/2023  CT ANGIOGRAM CHEST Date of Exam: 9/16/2023 11:26 PM EDT Indication: SOA, hypoxia, positive D Dimer, r/o PE. Comparison: 7/17/2023. Technique: CTA of the chest was performed after the uneventful intravenous administration of 75 mL Isovue-370. Reconstructed coronal and sagittal images were also obtained. In addition, a 3-D volume rendered image was created for interpretation. Automated exposure control and iterative reconstruction methods were used. Findings: There is no evidence of pulmonary embolus. Stable small bilateral pleural effusions, slightly larger on the right. Stable severe emphysema. Interlobular septal thickening may represent mild superimposed interstitial edema. There is stable scarring along the posterior left lower lobe superiorly. There is no pneumothorax or  lobar consolidation. Airways appear patent. Thyroid is not well seen. There is mild aortic atherosclerosis. The heart is overall normal size, but the right ventricle appears enlarged. The effusion. There is mild coronary artery calcification. No mediastinal lymphadenopathy. No acute findings in the superficial soft tissues. No acute findings in the limited evaluation of the upper abdomen. Patient is status post cholecystectomy. There is mild pneumobilia. This is unchanged. There are no acute osseous abnormalities or destructive bone lesions. There is a stable mild compression deformity at T12. Bones are demineralized. Stable ununited chronic left lateral eighth rib fracture.     Impression: 1.No evidence of pulmonary embolus. 2.Stable small bilateral pleural effusions, slightly larger on the right. 3.Stable severe emphysema. 4.Stable enlargement of the right ventricle and mild coronary artery calcification. Electronically Signed: Bishop Anand MD  9/16/2023 11:52 PM EDT  Workstation ID: [id number]             Results for orders placed during the hospital encounter of 09/16/23    Adult Transthoracic Echo Complete With Contrast if Necessary Per Protocol    Interpretation Summary    Left ventricular systolic function is normal. Calculated left ventricular EF = 54.4%    Left ventricular diastolic function is consistent with (grade I) impaired relaxation.    The right ventricular cavity is mildly dilated.    The right atrial cavity is mildly  dilated.    Moderate tricuspid valve regurgitation is present.    Estimated right ventricular systolic pressure from tricuspid regurgitation is markedly elevated (>55 mmHg). Calculated right ventricular systolic pressure from tricuspid regurgitation is 76 mmHg.    Severe pulmonary hypertension is present.      Plan for Follow-up of Pending Labs/Results:     Discharge Details        Discharge Medications        New Medications        Instructions Start Date   doxycycline 100 MG  capsule  Commonly known as: MONODOX   100 mg, Oral, Every 12 Hours Scheduled      pancrelipase (Lip-Prot-Amyl) 50106-67113 units capsule delayed-release particles capsule  Commonly known as: CREON   36,000 units of lipase, Oral, 3 Times Daily With Meals      PHARMACY MEDS TO BED CONSULT   Does not apply, Daily      predniSONE 20 MG tablet  Commonly known as: DELTASONE   20 mg, Oral, Daily   Start Date: September 21, 2023     predniSONE 5 MG tablet  Commonly known as: DELTASONE   15 mg, Oral, Daily   Start Date: September 23, 2023     predniSONE 10 MG tablet  Commonly known as: DELTASONE   10 mg, Oral, Daily   Start Date: September 26, 2023     predniSONE 5 MG tablet  Commonly known as: DELTASONE   5 mg, Oral, Daily   Start Date: September 29, 2023            Continue These Medications        Instructions Start Date   albuterol sulfate  (90 Base) MCG/ACT inhaler  Commonly known as: PROVENTIL HFA;VENTOLIN HFA;PROAIR HFA   2 puffs, Inhalation, Every 4 Hours PRN      calcium carbonate 500 MG chewable tablet  Commonly known as: TUMS   2 tablets, Oral, 2 Times Daily PRN, OTC      ipratropium-albuterol 0.5-2.5 mg/3 ml nebulizer  Commonly known as: DUO-NEB   3 mL, Nebulization, Every 4 Hours - RT      multivitamin with minerals tablet tablet   1 tablet, Oral, Daily      naproxen sodium 220 MG tablet  Commonly known as: ALEVE   220 mg, Oral, 2 Times Daily PRN      oxyCODONE-acetaminophen  MG per tablet  Commonly known as: PERCOCET   1 tablet, Oral, Every 4 Hours PRN      Trelegy Ellipta 100-62.5-25 MCG/ACT inhaler  Generic drug: Fluticasone-Umeclidin-Vilant   1 puff, Inhalation, Daily - RT               Allergies   Allergen Reactions    Green Pepper Swelling     Throat and face    Mushroom Swelling     Throat and face    Methadone Swelling         Discharge Disposition:  Home or Self Care    Diet:  Hospital:  Diet Order   Procedures    Diet: Regular/House Diet; Texture: Regular Texture (IDDSI 7); Fluid  Consistency: Thin (IDDSI 0)       Activity:             CODE STATUS:    Code Status and Medical Interventions:   Ordered at: 09/17/23 0159     Level Of Support Discussed With:    Patient     Code Status (Patient has no pulse and is not breathing):    CPR (Attempt to Resuscitate)     Medical Interventions (Patient has pulse or is breathing):    Full Support       Future Appointments   Date Time Provider Department Center   10/4/2023  8:30 AM [facility], PFT LAB 1 [facility]   10/4/2023  9:00 AM Alissa Bernard APRN [facility]       Additional Instructions for the Follow-ups that You Need to Schedule       Discharge Follow-up with PCP   As directed       Currently Documented PCP:    Provider, No Known    PCP Phone Number:    None     Follow Up Details: 1 week        Discharge Follow-up with Specialty: [facility] as scheduled 10/4/23   As directed      Specialty: [facility] as scheduled 10/4/23                      Memo Layton MD  09/20/23      Time Spent on Discharge:  I spent  35  minutes on this discharge activity which included: face-to-face encounter with the patient, reviewing the data in the system, coordination of the care with the nursing staff as well as consultants, documentation, and entering orders.

## 2023-09-20 NOTE — PLAN OF CARE
Goal Outcome Evaluation:  Plan of Care Reviewed With: patient        Progress: improving  Outcome Evaluation: Physical therapy treatment complete. The patient with significant improvement in activity tolerance since last treatment. Patient ambulated on 4 LPM with oxygen desaturation to 86%, with seated recovery patient patient recovered to 91%. The patient verbalized understanding of energy conservation strategies and breathing techniques. The patient has met PT goals, no further PT needs identified at this time. At hospital discharge recommend patient return home with assist. Patient would benefit from outpatient pulmonary rehab.      Anticipated Discharge Disposition (PT): home with assist, other (see comments) (outpatient pulmonary rehab)

## 2023-09-20 NOTE — PROGRESS NOTES
NN spoke with pt at .  Pt alert and able to answer questions appropriately. Pt O2 sat 99% on   4L with 1 extension currently, home O2 use 2-4 L. COPD action plan reviewed. Deep breathing exercises encouraged. CM contacted for smaller O2 tanks.  PFT scheduled.  No new concerns or questions voiced at this time.  NN will continue to follow as needed.       Per current GOLD Standards, please consider: LAMA/LABA/ICS in place (Trelegy), Outpatient PFT, Rehab as appropriate, Palliative Care consult, NRT at [place], Annual LDCT per current screening guidelines (age 50-80 years old, smoking history of 20 pack years or more or has quit within past 15 years)      Patient has been scheduled with [facility] for 10/4/2023 @ 8:30 am am with RAFA Ayoub to establish care.   Full PFT scheduled for this date as well.

## 2023-09-20 NOTE — PAYOR COMM NOTE
"Keo West (63 y.o. Female)     [employer] Medicare added to pt's account, traditional medicare removed    Valentina Mccracken RN  Utilization Review  [phone]  [phone]          Date of Birth   1960    Social Security Number       Address   [address]  [address] [place] [address]    Home Phone   [phone]    MRN   [id number]       Rastafarian   Yarsani    Marital Status                               Admission Date   23    Admission Type   Emergency    Admitting Provider   Memo Layton MD    Attending Provider   Memo Layton MD    Department, Room/Bed   [facility] 6A, N615/1       Discharge Date       Discharge Disposition       Discharge Destination                                 Attending Provider: Memo Layton MD    Allergies: Green Pepper, Mushroom, Methadone    Isolation: None   Infection: None   Code Status: CPR    Ht: 170.2 cm (67.01\")   Wt: 36.7 kg (81 lb)    Admission Cmt: None   Principal Problem: COPD with acute exacerbation [J44.1]                   Active Insurance as of 2023       Primary Coverage       Payor Plan Insurance Group Employer/Plan Group    [facility] [employer] MEDICARE REPLACEMENT [id number]       Payor Plan Address Payor Plan Phone Number Payor Plan Fax Number Effective Dates    [address] [address] [phone]  2019 - None Entered    [facility] [id number]         Subscriber Name Subscriber Birth Date Member ID       KEO WEST 1960 [id number]                     Emergency Contacts        (Rel.) Home Phone Work Phone Mobile Phone    Emmanuel Fried (Friend) -- -- [phone]    Pearl Owens (Daughter) -- -- [phone]                 History & Physical        Raman Savage MD at 23 0120              [facility] Medicine Services  HISTORY AND PHYSICAL    Patient Name: Keo West  : " 1960  MRN: [id number]  Primary Care Physician: Provider, No Known  Date of admission: 9/16/2023    Subjective  Subjective     Chief Complaint:  Shortness of breath, syncope     HPI:  Mary West is a 63 y.o. female with PMH significant for COPD with chronic respiratory failure on 2 LNC, chronic pancreatitis, malnutrition, who presents to the ED with complaint of shortness of breath and syncope.  She states that over the past week she has had progressive shortness of breath.  Today the shortness of breath was significantly increased despite using her oxygen and inhalers at home.  She also reports dizziness and lightheadedness with standing and 1 episode of syncope this morning.  She denies fever, chest pain, nausea, vomiting, diarrhea.  Upon arrival to the ED, she is noted to have mildly elevated troponin and elevated proBNP.  She is also noted to be hypokalemic.  Respiratory panel is negative.  She did have elevated D-dimer with CTA chest negative for PE and concern for stable small bilateral pleural effusions and severe emphysema.  She was given Solu-Medrol and DuoNeb treatment while in the ED.  She will be admitted to hospital medicine for further evaluation.    Review of Systems   Constitutional:  Positive for activity change. Negative for appetite change, chills, diaphoresis, fatigue, fever and unexpected weight change.   HENT: Negative.  Negative for congestion, sneezing, sore throat and trouble swallowing.    Eyes: Negative.  Negative for visual disturbance.   Respiratory:  Positive for chest tightness, shortness of breath and wheezing. Negative for cough.    Cardiovascular: Negative.  Negative for chest pain, palpitations and leg swelling.   Gastrointestinal: Negative.  Negative for abdominal distention, abdominal pain, constipation, diarrhea, nausea and vomiting.   Endocrine: Negative.  Negative for polydipsia and polyphagia.   Genitourinary: Negative.  Negative for difficulty urinating, dysuria,  frequency and urgency.   Musculoskeletal: Negative.  Negative for arthralgias, back pain, gait problem, myalgias and neck pain.   Skin: Negative.  Negative for color change, pallor, rash and wound.   Allergic/Immunologic: Negative.  Negative for immunocompromised state.   Neurological:  Positive for dizziness, syncope, weakness and light-headedness. Negative for seizures, facial asymmetry, speech difficulty, numbness and headaches.   Hematological: Negative.  Does not bruise/bleed easily.   Psychiatric/Behavioral:  Negative for confusion. The patient is not nervous/anxious.               Personal History     Past Medical History:   Diagnosis Date    Alcohol abuse     Quit early 2000's    COPD (chronic obstructive pulmonary disease)     Pancreatitis          No past surgical history on file.    Family History:  family history includes Heart failure in her mother; Stroke in her father and mother.     Social History:  reports that she has been smoking cigarettes. She has a 20.00 pack-year smoking history. She has never used smokeless tobacco. She reports that she does not drink alcohol and does not use drugs.  Social History     Social History Narrative    Not on file       Medications:  Fluticasone-Umeclidin-Vilant, Pancrelipase (Lip-Prot-Amyl), albuterol sulfate HFA, calcium carbonate, ipratropium-albuterol, multivitamin with minerals, and predniSONE    Allergies   Allergen Reactions    Green Pepper Swelling     Throat and face    Mushroom Swelling     Throat and face    Methadone Swelling       Objective  Objective     Vital Signs:   Temp:  [98.3 °F (36.8 °C)] 98.3 °F (36.8 °C)  Heart Rate:  [67-90] 68  Resp:  [18-20] 18  BP: (141-164)/(77-94) 145/77  Flow (L/min):  [6] 6    Physical Exam   Constitutional: Awake, alert, cachetic, temporal muscle wasting   Eyes: PERRLA, sclerae anicteric, no conjunctival injection  HENT: NCAT, mucous membranes moist  Neck: Supple, no thyromegaly, no lymphadenopathy, trachea  midline  Respiratory: decreased to auscultation bilaterally, nonlabored respirations, 6L NC in place.    Cardiovascular: RRR, no murmurs, rubs, or gallops, palpable pedal pulses bilaterally  Gastrointestinal: Positive bowel sounds, soft, nontender, nondistended  Musculoskeletal: No bilateral ankle edema, no clubbing or cyanosis to extremities  Psychiatric: Appropriate affect, cooperative  Neurologic: Oriented x 3, strength symmetric in all extremities, Cranial Nerves grossly intact to confrontation, speech clear  Skin: No rashes      Result Review:  I have personally reviewed the results from the time of this admission to 9/17/2023 01:46 EDT and agree with these findings:  [x]  Laboratory list / accordion  [x]  Microbiology  [x]  Radiology  []  EKG/Telemetry   []  Cardiology/Vascular   []  Pathology  [x]  Old records  []  Other:  Most notable findings include:     LAB RESULTS:      Lab 09/16/23 2010   WBC 8.27   HEMOGLOBIN 13.6   HEMATOCRIT 42.6   PLATELETS 232   NEUTROS ABS 5.48   IMMATURE GRANS (ABS) 0.03   LYMPHS ABS 2.10   MONOS ABS 0.51   EOS ABS 0.10   .6*   CRP <0.30   PROCALCITONIN 0.04   LACTATE 2.0   *   D DIMER QUANT 1.11*         Lab 09/16/23 2010   SODIUM 146*   POTASSIUM 3.3*   CHLORIDE 106   CO2 30.0*   ANION GAP 10.0   BUN 13   CREATININE 0.55*   EGFR 103.1   GLUCOSE 93   CALCIUM 9.2         Lab 09/16/23 2010   TOTAL PROTEIN 6.3   ALBUMIN 4.1   GLOBULIN 2.2   ALT (SGPT) 10   AST (SGOT) 22   BILIRUBIN 0.6   ALK PHOS 82         Lab 09/17/23  0029 09/16/23 2010   PROBNP  --  7,481.0*   HSTROP T 20* 24*                 Lab 09/16/23 2039   PH, ARTERIAL 7.435   PCO2, ARTERIAL 45.5*   PO2 ART 71.4*   FIO2 44   HCO3 ART 30.6*   BASE EXCESS ART 5.5*   CARBOXYHEMOGLOBIN 3.9*     Brief Urine Lab Results       None          Microbiology Results (last 10 days)       Procedure Component Value - Date/Time    COVID PRE-OP / PRE-PROCEDURE SCREENING ORDER (NO ISOLATION) - Swab, Nasopharynx  [[id number]]  (Normal) Collected: 09/16/23 2021    Lab Status: Final result Specimen: Swab from Nasopharynx Updated: 09/16/23 2119    Narrative:      The following orders were created for panel order COVID PRE-OP / PRE-PROCEDURE SCREENING ORDER (NO ISOLATION) - Swab, Nasopharynx.  Procedure                               Abnormality         Status                     ---------                               -----------         ------                     Respiratory Panel PCR w/...[[id number]]  Normal              Final result                 Please view results for these tests on the individual orders.    Respiratory Panel PCR w/COVID-19(SARS-CoV-2) JONAS/RINA/NAFISA/PAD/COR/MAD/SALOME In-House, NP Swab in UTM/VTM, 3-4 HR TAT - Swab, Nasopharynx [[id number]]  (Normal) Collected: 09/16/23 2021    Lab Status: Final result Specimen: Swab from Nasopharynx Updated: 09/16/23 2119     ADENOVIRUS, PCR Not Detected     Coronavirus 229E Not Detected     Coronavirus HKU1 Not Detected     Coronavirus NL63 Not Detected     Coronavirus OC43 Not Detected     COVID19 Not Detected     Human Metapneumovirus Not Detected     Human Rhinovirus/Enterovirus Not Detected     Influenza A PCR Not Detected     Influenza B PCR Not Detected     Parainfluenza Virus 1 Not Detected     Parainfluenza Virus 2 Not Detected     Parainfluenza Virus 3 Not Detected     Parainfluenza Virus 4 Not Detected     RSV, PCR Not Detected     Bordetella pertussis pcr Not Detected     Bordetella parapertussis PCR Not Detected     Chlamydophila pneumoniae PCR Not Detected     Mycoplasma pneumo by PCR Not Detected    Narrative:      In the setting of a positive respiratory panel with a viral infection PLUS a negative procalcitonin without other underlying concern for bacterial infection, consider observing off antibiotics or discontinuation of antibiotics and continue supportive care. If the respiratory panel is positive for atypical bacterial infection (Bordetella pertussis,  Chlamydophila pneumoniae, or Mycoplasma pneumoniae), consider antibiotic de-escalation to target atypical bacterial infection.            XR Chest 1 View    Result Date: 9/16/2023  XR CHEST 1 VW Date of Exam: 9/16/2023 8:40 PM EDT Indication: shortness of breath Comparison: Chest CT 7/17/2023 and chest radiograph same date. Findings: There is diffuse interstitial prominence and there are cystic areas of lucency in both lungs. There are stable small bilateral pleural effusions. There is improved left midlung airspace disease. No definite new focal lung opacity. No pneumothorax. Heart size is unchanged. Bones are demineralized. No definite acute osseous abnormality. Stable mild lower thoracic compression deformity.     Impression: Impression: 1.Improved left midlung airspace disease. 2.Stable small bilateral pleural effusions. 3.Stable chronic changes in the lungs. Electronically Signed: Bishop Anand MD  9/16/2023 9:17 PM EDT  Workstation ID: [id number]    CT Angiogram Chest    Result Date: 9/16/2023  CT ANGIOGRAM CHEST Date of Exam: 9/16/2023 11:26 PM EDT Indication: SOA, hypoxia, positive D Dimer, r/o PE. Comparison: 7/17/2023. Technique: CTA of the chest was performed after the uneventful intravenous administration of 75 mL Isovue-370. Reconstructed coronal and sagittal images were also obtained. In addition, a 3-D volume rendered image was created for interpretation. Automated exposure control and iterative reconstruction methods were used. Findings: There is no evidence of pulmonary embolus. Stable small bilateral pleural effusions, slightly larger on the right. Stable severe emphysema. Interlobular septal thickening may represent mild superimposed interstitial edema. There is stable scarring along the posterior left lower lobe superiorly. There is no pneumothorax or lobar consolidation. Airways appear patent. Thyroid is not well seen. There is mild aortic atherosclerosis. The heart is overall normal size, but  the right ventricle appears enlarged. The effusion. There is mild coronary artery calcification. No mediastinal lymphadenopathy. No acute findings in the superficial soft tissues. No acute findings in the limited evaluation of the upper abdomen. Patient is status post cholecystectomy. There is mild pneumobilia. This is unchanged. There are no acute osseous abnormalities or destructive bone lesions. There is a stable mild compression deformity at T12. Bones are demineralized. Stable ununited chronic left lateral eighth rib fracture.     Impression: Impression: 1.No evidence of pulmonary embolus. 2.Stable small bilateral pleural effusions, slightly larger on the right. 3.Stable severe emphysema. 4.Stable enlargement of the right ventricle and mild coronary artery calcification. Electronically Signed: Bishop Anand MD  9/16/2023 11:52 PM EDT  Workstation ID: [id number]     Results for orders placed during the hospital encounter of 01/30/22    Adult Transthoracic Echo Complete W/ Cont if Necessary Per Protocol    Interpretation Summary  · Estimated right ventricular systolic pressure from tricuspid regurgitation is normal (<35 mmHg). Calculated right ventricular systolic pressure from tricuspid regurgitation is 29 mmHg.  · Estimated left ventricular EF = 50% Left ventricular systolic function is normal.      Assessment & Plan  Assessment & Plan       COPD with acute exacerbation    Acute on chronic respiratory failure with hypoxia and hypercapnia    Chronic Pancreatitis    Severe malnutrition    COPD (chronic obstructive pulmonary disease)    Hypokalemia    Elevated troponin    Syncope    63 y.o. female with PMH significant for COPD with chronic respiratory failure on 2 LNC, chronic pancreatitis, malnutrition, who presents to the ED with complaint of shortness of breath and syncope who was found to have concern for COPD with acute exacerbation.    Acute on chronic respiratory failure with hypercapnia and hypoxia  COPD  exacerbation  - Baseline supplemental oxygen to L NC, currently on 6 LNC  - Scheduled and as needed DuoNebs  - Solu-Medrol 80 mg given in the ED, continue 40 mg 3 times daily for now with plan to taper as appropriate  - COPD navigator  - Consider pulmonary consult in the a.m.  - Hold antibiotic therapy for now, no increased cough or sputum production  - CBC, BMP in the a.m.    Syncope  - Orthostatic BP  - Likely related to hypoxia  - Echo in the a.m.  - Fall precautions  - CT head pending    Elevated troponin  Elevated proBNP  - Troponin trending down  - Denies chest pain  - Echo in the a.m.  - Does not appear volume overloaded, stable pleural effusions noted on CTA chest    Hypokalemia  - Electrolyte replacement  - BMP, magnesium in the a.m    Chronic pancreatitis  - Continue Creon    Severe malnutrition  - Nutritional consult in the a.m.    Tobacco abuse  - Reports longer smoking, but now using vapes  - Encourage cessation    DVT prophylaxis: lovenox     CODE STATUS:         Expected Discharge  Expected Discharge Date: 9/20/2023; Expected Discharge Time:       This note has been completed as part of a split-shared workflow.     Signature: Electronically signed by RAFA Pepper, 09/17/23, 2:01 AM EDT.            Attending   Admission Attestation       I have performed an independent face-to-face diagnostic evaluation including performing an independent physical examination.  The documented plan of care above was reviewed and developed with the advanced practice clinician (APC).  I have updated the HPI as appropriate.    Brief HPI    63 year old with heavy smoking history, COPD with worsening shortness of breath. Recently quit cigarettes but started vaping. No fevers, chills, chest pain. Reports feeling much better now.    Attending Physical Exam:  Temp:  [98.3 °F (36.8 °C)] 98.3 °F (36.8 °C)  Heart Rate:  [67-90] 68  Resp:  [18-20] 18  BP: (141-164)/(77-94) 145/77  Flow (L/min):  [6] 6    Awake, alert,  "oriented x3  Comfortable  Diminished air movement bilaterally w/o rales or rhonchi  Heart RRR no m/r/g  Abdomen soft  No LE edema    Assessment and Plan:    COPD exacerbation  -mild hypercapnia on blood gas  -on 6L currently saturating 88%, appears comfortable  -c/w nebs, IV steroids, doxy  -needs consistent follow up with pulm    Elevated BNP  Small pleural effusions  -check echo in am, last echo 2022 50% EF    Agree with plan per APC note    See assessment and plan documented by APC above and updated/edited by me as appropriate.    Raman Savage MD  09/17/23                    Electronically signed by Raman Savage MD at 09/17/23 0330          Emergency Department Notes        Myesha Dick PA-C at 09/16/23 2002       Attestation signed by Alf Fernandez DO at 09/17/23 1616        SUPERVISE: For this patient encounter, I reviewed the APC's documentation, treatment plan, and medical decision making.  Alf Fernandez DO 9/17/2023 16:16 EDT                         Subjective   History of Present Illness  This is a 63-year-old female that presents the ER with worsening shortness of breath x1 week.  Patient has chronic shortness of breath with advanced emphysema.  She has smoked half pack to 1 pack/day for at least 50 years.  She started smoking at the age of 13.  She does not follow with pulmonology routinely.  She says that her PCP through [facility], is trying to set her up with pulmonology.  She was admitted to our facility on 7/17/2023 through 7/23/2023 for COPD exacerbation, severe malnutrition, hypoglycemia, and left lower lobe community-acquired pneumonia.  She presented to the ER with worsening shortness of breath, continued tobacco dependence, and said that her home oxygen tank \"ran dry\".  During last admission, patient initially required up to 6 L but improved back to her baseline at 2 L per nasal cannula.  CTA of the chest revealed left lower lobe pneumonia.  She was " treated with ceftriaxone and doxycycline and given steroids and nebulizer treatments.  Patient says that she feels similar to the last admission.  She stopped smoking 2 weeks ago when she was initiated on a Trilegy inhaler.  She says that she felt great for the first 3 to 4 days of using this inhaler.  She then started having some nicotine urges and then started vaping.  Patient said she has had increased shortness of breath x1 week.  She reports chest tightness and wheezing.  She went through 3 oxygen tanks at home and then ran out.  She came straight to the ER this evening due to shortness of breath.  O2 sat upon arrival was 77% on room air.    History provided by:  Patient  Shortness of Breath  Severity:  Severe  Onset quality:  Gradual  Duration:  1 week  Timing:  Constant  Progression:  Worsening  Chronicity:  Chronic  Context comment:  Patient has history of advanced emphysema.  She quit smoking 2 weeks ago and started vaping.  She uses 2 L of O2 continuously.  Worsening shortness of breath x1 week, but she ran out of O2 in tanks today.  O2 sat 77% on arrival.  Relieved by:  Nothing  Worsened by:  Nothing  Ineffective treatments: Trilegy inhaler, O2 per 2L NC continuously.  Associated symptoms: cough (Mild, chronic nonproductive cough) and wheezing    Associated symptoms: no abdominal pain, no chest pain, no claudication, no diaphoresis, no ear pain, no fever, no headaches, no hemoptysis, no neck pain, no PND, no rash, no sore throat, no sputum production, no syncope, no swollen glands and no vomiting    Risk factors: tobacco use (Patient has smoked at least half pack per day since the age of 13.  She quit smoking 2 weeks ago and started vaping nicotine products.)      Review of Systems   Constitutional:  Positive for activity change, appetite change and fatigue. Negative for diaphoresis and fever.   HENT: Negative.  Negative for congestion, ear pain, postnasal drip, rhinorrhea, sinus pressure, sinus pain,  sneezing and sore throat.    Respiratory:  Positive for cough (Mild, chronic nonproductive cough), chest tightness, shortness of breath and wheezing. Negative for hemoptysis and sputum production.         Longstanding tobacco dependence.  Last cigarette was this morning.  Patient has smoked half pack to 1 pack/day since the age of 13.  History of advanced emphysema.  She utilizes O2 at 2 L per nasal cannula continuously at home.   Cardiovascular: Negative.  Negative for chest pain, claudication, syncope and PND.   Gastrointestinal: Negative.  Negative for abdominal pain and vomiting.   Genitourinary: Negative.    Musculoskeletal: Negative.  Negative for back pain and neck pain.   Skin:  Negative for rash.   Neurological: Negative.  Negative for dizziness, syncope and headaches.   All other systems reviewed and are negative.    Past Medical History:   Diagnosis Date    Alcohol abuse     Quit early 2000's    COPD (chronic obstructive pulmonary disease)     Pancreatitis        Allergies   Allergen Reactions    Green Pepper Swelling     Throat and face    Mushroom Swelling     Throat and face    Methadone Swelling       No past surgical history on file.    Family History   Problem Relation Age of Onset    Heart failure Mother     Stroke Mother     Stroke Father        Social History     Socioeconomic History    Marital status:    Tobacco Use    Smoking status: Every Day     Packs/day: 0.50     Years: 40.00     Pack years: 20.00     Types: Cigarettes    Smokeless tobacco: Never   Vaping Use    Vaping Use: Never used   Substance and Sexual Activity    Alcohol use: Never    Drug use: Never    Sexual activity: Defer           Objective   Physical Exam  Vitals and nursing note reviewed.   Constitutional:       General: She is not in acute distress.     Appearance: Normal appearance. She is not ill-appearing, toxic-appearing or diaphoretic.      Comments: Thin built, cachectic female.  No acute distress.  Nontoxic.    HENT:      Head: Normocephalic and atraumatic.      Nose: Nose normal.      Mouth/Throat:      Mouth: Mucous membranes are moist.      Pharynx: Oropharynx is clear.   Eyes:      Extraocular Movements: Extraocular movements intact.      Conjunctiva/sclera: Conjunctivae normal.      Pupils: Pupils are equal, round, and reactive to light.   Cardiovascular:      Rate and Rhythm: Normal rate and regular rhythm. No extrasystoles are present.     Pulses: Normal pulses.      Heart sounds: Normal heart sounds.      Comments: Regular rate and rhythm.  No ectopy.  No pedal edema to lower extremities.  Pulmonary:      Effort: Pulmonary effort is normal. Tachypnea present. No accessory muscle usage or retractions.      Breath sounds: Decreased breath sounds present. No wheezing, rhonchi or rales.      Comments: Tachypnea with conversation.  O2 per nasal cannula in place.  No retractions or accessory muscle use.  Globally diminished breath sounds secondary to COPD.  No pleuritic type chest pain with deep inspiration.  No wheezes or rhonchi.  No respiratory distress.  Abdominal:      General: Bowel sounds are normal. There is no distension.      Palpations: Abdomen is soft.      Tenderness: There is no abdominal tenderness. There is no right CVA tenderness, left CVA tenderness, guarding or rebound.      Comments: Abdomen soft and nontender.  No flank or CVA tenderness.   Musculoskeletal:         General: Normal range of motion.      Cervical back: Normal range of motion and neck supple.      Right lower leg: No edema.      Left lower leg: No edema.   Skin:     General: Skin is warm and dry.   Neurological:      General: No focal deficit present.      Mental Status: She is alert.       Procedures          ED Course  ED Course as of 09/17/23 0130   Sun Sep 17, 2023   0125 EKGs x2 show sinus rhythm.  Initial EKG showed PVCs, but 2 are EKG showed sinus rhythm without ectopy or arrhythmia.  No acute ST-T wave changes consistent with  ischemia.  CBC and chemistries were essentially normal.  ABG showed pH 7.43, PCO2 45, PO2 71, bicarb is 30.  Respiratory PCR panel was completely negative.  BNP is 7481.  D-dimer was elevated at 1.11.  Lactic acid is 2.0.  LDH is 244.  CRP is negative.  High-sensitivity troponin was 24 and repeat 2-hour high-sensitivity troponin was 20.  Patient given IV Solu-Medrol and DuoNeb treatment.  She felt much improved.  Patient was hypoxic, but she appears comfortable on 6 L per nasal cannula.  She is not having any retractions or accessory muscle use.  Chest x-ray reveals improved left midlung airspace disease.  Stable small bilateral pleural effusions and stable chronic changes.  Heart score was 2.  We proceeded with CTA of the chest with contrast.  There was no evidence of pulmonary embolism.  Stable small bilateral pleural effusions, slightly larger on the right.  Stable severe emphysema.  Stable enlargement of the right ventricle and mild coronary artery calcifications.  Patient has no home oxygen supplementation at this time.  She also does not have a nebulizer machine.  Recommend case management consult to help set up some resources for patient and we also recommend hospitalist to consult pulmonology to establish care for long-term management of emphysema.  Recommend aggressive pulmonary toilet.  Discussed admission with Dr. Savage, hospitalist, and he is agreeable to admission on telemetry.  I updated patient on all results and need for admission and she is appreciative and agreeable. [FC]      ED Course User Index  [FC] Myesha Dick, CIRA           Recent Results (from the past 24 hour(s))   Comprehensive Metabolic Panel    Collection Time: 09/16/23  8:10 PM    Specimen: Blood   Result Value Ref Range    Glucose 93 65 - 99 mg/dL    BUN 13 8 - 23 mg/dL    Creatinine 0.55 (L) 0.57 - 1.00 mg/dL    Sodium 146 (H) 136 - 145 mmol/L    Potassium 3.3 (L) 3.5 - 5.2 mmol/L    Chloride 106 98 - 107 mmol/L    CO2 30.0 (H)  22.0 - 29.0 mmol/L    Calcium 9.2 8.6 - 10.5 mg/dL    Total Protein 6.3 6.0 - 8.5 g/dL    Albumin 4.1 3.5 - 5.2 g/dL    ALT (SGPT) 10 1 - 33 U/L    AST (SGOT) 22 1 - 32 U/L    Alkaline Phosphatase 82 39 - 117 U/L    Total Bilirubin 0.6 0.0 - 1.2 mg/dL    Globulin 2.2 gm/dL    A/G Ratio 1.9 g/dL    BUN/Creatinine Ratio 23.6 7.0 - 25.0    Anion Gap 10.0 5.0 - 15.0 mmol/L    eGFR 103.1 >60.0 mL/min/1.73   Lactate Dehydrogenase    Collection Time: 09/16/23  8:10 PM    Specimen: Blood   Result Value Ref Range     (H) 135 - 214 U/L   Procalcitonin    Collection Time: 09/16/23  8:10 PM    Specimen: Blood   Result Value Ref Range    Procalcitonin 0.04 0.00 - 0.25 ng/mL   C-reactive Protein    Collection Time: 09/16/23  8:10 PM    Specimen: Blood   Result Value Ref Range    C-Reactive Protein <0.30 0.00 - 0.50 mg/dL   Lactic Acid, Plasma    Collection Time: 09/16/23  8:10 PM    Specimen: Blood   Result Value Ref Range    Lactate 2.0 0.5 - 2.0 mmol/L   Single High Sensitivity Troponin T    Collection Time: 09/16/23  8:10 PM    Specimen: Blood   Result Value Ref Range    HS Troponin T 24 (H) <10 ng/L   BNP    Collection Time: 09/16/23  8:10 PM    Specimen: Blood   Result Value Ref Range    proBNP 7,481.0 (H) 0.0 - 900.0 pg/mL   CBC Auto Differential    Collection Time: 09/16/23  8:10 PM    Specimen: Blood   Result Value Ref Range    WBC 8.27 3.40 - 10.80 10*3/mm3    RBC 4.11 3.77 - 5.28 10*6/mm3    Hemoglobin 13.6 12.0 - 15.9 g/dL    Hematocrit 42.6 34.0 - 46.6 %    .6 (H) 79.0 - 97.0 fL    MCH 33.1 (H) 26.6 - 33.0 pg    MCHC 31.9 31.5 - 35.7 g/dL    RDW 13.8 12.3 - 15.4 %    RDW-SD 53.5 37.0 - 54.0 fl    MPV 10.3 6.0 - 12.0 fL    Platelets 232 140 - 450 10*3/mm3    Neutrophil % 66.2 42.7 - 76.0 %    Lymphocyte % 25.4 19.6 - 45.3 %    Monocyte % 6.2 5.0 - 12.0 %    Eosinophil % 1.2 0.3 - 6.2 %    Basophil % 0.6 0.0 - 1.5 %    Immature Grans % 0.4 0.0 - 0.5 %    Neutrophils, Absolute 5.48 1.70 - 7.00 10*3/mm3     Lymphocytes, Absolute 2.10 0.70 - 3.10 10*3/mm3    Monocytes, Absolute 0.51 0.10 - 0.90 10*3/mm3    Eosinophils, Absolute 0.10 0.00 - 0.40 10*3/mm3    Basophils, Absolute 0.05 0.00 - 0.20 10*3/mm3    Immature Grans, Absolute 0.03 0.00 - 0.05 10*3/mm3    nRBC 0.0 0.0 - 0.2 /100 WBC   D-dimer, Quantitative    Collection Time: 09/16/23  8:10 PM    Specimen: Blood   Result Value Ref Range    D-Dimer, Quantitative 1.11 (H) 0.00 - 0.63 MCGFEU/mL   ECG 12 Lead Dyspnea    Collection Time: 09/16/23  8:14 PM   Result Value Ref Range    QT Interval 428 ms    QTC Interval 477 ms   Respiratory Panel PCR w/COVID-19(SARS-CoV-2) JONAS/RINA/NAFISA/PAD/[facility]/MAD/SALOME In-House, NP Swab in UTM/VTM, 3-4 HR TAT - Swab, Nasopharynx    Collection Time: 09/16/23  8:21 PM    Specimen: Nasopharynx; Swab   Result Value Ref Range    ADENOVIRUS, PCR Not Detected Not Detected    Coronavirus 229E Not Detected Not Detected    Coronavirus HKU1 Not Detected Not Detected    Coronavirus NL63 Not Detected Not Detected    Coronavirus OC43 Not Detected Not Detected    COVID19 Not Detected Not Detected - Ref. Range    Human Metapneumovirus Not Detected Not Detected    Human Rhinovirus/Enterovirus Not Detected Not Detected    Influenza A PCR Not Detected Not Detected    Influenza B PCR Not Detected Not Detected    Parainfluenza Virus 1 Not Detected Not Detected    Parainfluenza Virus 2 Not Detected Not Detected    Parainfluenza Virus 3 Not Detected Not Detected    Parainfluenza Virus 4 Not Detected Not Detected    RSV, PCR Not Detected Not Detected    Bordetella pertussis pcr Not Detected Not Detected    Bordetella parapertussis PCR Not Detected Not Detected    Chlamydophila pneumoniae PCR Not Detected Not Detected    Mycoplasma pneumo by PCR Not Detected Not Detected   Blood Gas, Arterial With Co-Ox    Collection Time: 09/16/23  8:39 PM    Specimen: Arterial Blood   Result Value Ref Range    Site Right Brachial     Collin's Test N/A     pH, Arterial 7.435 7.350 -  7.450 pH units    pCO2, Arterial 45.5 (H) 35.0 - 45.0 mm Hg    pO2, Arterial 71.4 (L) 83.0 - 108.0 mm Hg    HCO3, Arterial 30.6 (H) 20.0 - 26.0 mmol/L    Base Excess, Arterial 5.5 (H) 0.0 - 2.0 mmol/L    Hemoglobin, Blood Gas 13.1 (L) 14 - 18 g/dL    Hematocrit, Blood Gas 40.0 38.0 - 51.0 %    Oxyhemoglobin 90.9 (L) 94 - 99 %    Methemoglobin 0.10 0.00 - 1.50 %    Carboxyhemoglobin 3.9 (H) 0 - 2 %    CO2 Content 31.9 22 - 33 mmol/L    Temperature 37.0 C    Barometric Pressure for Blood Gas      Modality Nasal Cannula     FIO2 44 %    Rate 0 Breaths/minute    PIP 0 cmH2O    IPAP 0     EPAP 0     pH, Temp Corrected 7.435 pH Units    pCO2, Temperature Corrected 45.5 (H) 35 - 45 mm Hg    pO2, Temperature Corrected 71.4 (L) 83 - 108 mm Hg   ECG 12 Lead Dyspnea    Collection Time: 09/16/23 11:17 PM   Result Value Ref Range    QT Interval 436 ms    QTC Interval 473 ms   Single High Sensitivity Troponin T    Collection Time: 09/17/23 12:29 AM    Specimen: Blood   Result Value Ref Range    HS Troponin T 20 (H) <10 ng/L     Note: In addition to lab results from this visit, the labs listed above may include labs taken at another facility or during a different encounter within the last 24 hours. Please correlate lab times with ED admission and discharge times for further clarification of the services performed during this visit.    CT Angiogram Chest   Final Result   Impression:   1.No evidence of pulmonary embolus.   2.Stable small bilateral pleural effusions, slightly larger on the right.   3.Stable severe emphysema.   4.Stable enlargement of the right ventricle and mild coronary artery calcification.            Electronically Signed: Bishop Anand MD     9/16/2023 11:52 PM EDT     Workstation ID: [id number]      XR Chest 1 View   Final Result   Impression:   1.Improved left midlung airspace disease.   2.Stable small bilateral pleural effusions.   3.Stable chronic changes in the lungs.            Electronically Signed: Bishop  MD Kika     9/16/2023 9:17 PM EDT     Workstation ID: [id number]        Vitals:    09/16/23 2318 09/16/23 2320 09/17/23 0000 09/17/23 0020   BP:  152/88 157/87 153/90   Pulse:  74 71 70   Resp:       Temp:       TempSrc:       SpO2: 96%  91% 90%   Weight:       Height:         Medications   sodium chloride 0.9 % flush 10 mL (has no administration in time range)   methylPREDNISolone sodium succinate (SOLU-Medrol) injection 80 mg (80 mg Intravenous Given 9/16/23 2153)   ipratropium-albuterol (DUO-NEB) nebulizer solution 3 mL (3 mL Nebulization Given 9/16/23 2041)   iopamidol (ISOVUE-370) 76 % injection 100 mL (75 mL Intravenous Given 9/16/23 2333)     ECG/EMG Results (last 24 hours)       Procedure Component Value Units Date/Time    ECG 12 Lead Dyspnea [[id number]] Collected: 09/16/23 2014     Updated: 09/16/23 2013     QT Interval 428 ms      QTC Interval 477 ms     Narrative:      Test Reason : Dyspnea  Blood Pressure :   */*   mmHG  Vent. Rate :  75 BPM     Atrial Rate :  75 BPM     P-R Int : 112 ms          QRS Dur :  98 ms      QT Int : 428 ms       P-R-T Axes :  66  91  76 degrees     QTc Int : 477 ms    Sinus rhythm with premature supraventricular complexes  Possible Left atrial enlargement  Rightward axis  Pulmonary disease pattern  Incomplete right bundle branch block  Abnormal ECG  When compared with ECG of 17-JUL-2023 15:27,  premature supraventricular complexes are now present  Nonspecific T wave abnormality no longer evident in Inferior leads    Referred By:            Confirmed By:           ECG 12 Lead Dyspnea   Preliminary Result   Test Reason : Dyspnea   Blood Pressure :   */*   mmHG   Vent. Rate :  71 BPM     Atrial Rate :  71 BPM      P-R Int : 120 ms          QRS Dur :  92 ms       QT Int : 436 ms       P-R-T Axes :  63  98  52 degrees      QTc Int : 473 ms      Normal sinus rhythm   Possible Left atrial enlargement   Rightward axis   Incomplete right bundle branch block   Septal infarct , age  undetermined   Abnormal ECG   When compared with ECG of 16-SEP-2023 20:14, (Unconfirmed)   premature supraventricular complexes are no longer present   Septal infarct is now present   Nonspecific T wave abnormality now evident in Inferior leads      Referred By:            Confirmed By:       ECG 12 Lead Dyspnea   Preliminary Result   Test Reason : Dyspnea   Blood Pressure :   */*   mmHG   Vent. Rate :  75 BPM     Atrial Rate :  75 BPM      P-R Int : 112 ms          QRS Dur :  98 ms       QT Int : 428 ms       P-R-T Axes :  66  91  76 degrees      QTc Int : 477 ms      Sinus rhythm with premature supraventricular complexes   Possible Left atrial enlargement   Rightward axis   Pulmonary disease pattern   Incomplete right bundle branch block   Abnormal ECG   When compared with ECG of 17-JUL-2023 15:27,   premature supraventricular complexes are now present   Nonspecific T wave abnormality no longer evident in Inferior leads      Referred By:            Confirmed By:                PERC Rule for Pulmonary Embolism - MDCalc  Calculated on Sep 17 2023 1:25 AM  2 criteria -> If any criteria are positive, the PERC rule cannot be used to rule out PE in this patient.                           Medical Decision Making  Amount and/or Complexity of Data Reviewed  Labs: ordered.  Radiology: ordered.  ECG/medicine tests: ordered.    Risk  Prescription drug management.  Decision regarding hospitalization.        Final diagnoses:   Acute on chronic respiratory failure with hypoxia   Acute exacerbation of chronic obstructive pulmonary disease (COPD)   Bilateral pleural effusion   Hypoxia   Tobacco dependence   Malnutrition, unspecified type       ED Disposition  ED Disposition       ED Disposition   Decision to Admit    Condition   --    Comment   Level of Care: Telemetry [5]   Diagnosis: COPD (chronic obstructive pulmonary disease) [781047]   Certification: I Certify That Inpatient Hospital Services Are Medically Necessary For  Greater Than 2 Midnights                 No follow-up provider specified.       Medication List      No changes were made to your prescriptions during this visit.            Myesha Dick PA-C  09/17/23 0130      Electronically signed by Alf Fernandez DO at 09/17/23 1616       Vital Signs (last 3 days)       Date/Time Temp Temp src Pulse Resp BP Patient Position SpO2    09/20/23 1117 -- -- 76 18 -- -- 93    09/20/23 0757 -- -- 64 18 -- -- 98    09/20/23 0725 97.7 (36.5) Oral 71 18 131/96 Lying 97    09/20/23 0600 -- -- 66 -- -- -- 95    09/20/23 0508 97.8 (36.6) Oral 71 16 106/83 Lying 96    09/20/23 0400 -- -- 64 -- -- -- 99    09/20/23 0200 -- -- 75 -- -- -- 95    09/20/23 0028 98.4 (36.9) Oral 83 18 136/83 Lying 92    09/20/23 0000 -- -- 73 -- -- -- 94    09/19/23 2201 -- -- 83 -- -- -- 92    09/19/23 2200 -- -- 82 -- -- -- --    09/19/23 2139 -- -- 78 -- -- -- 91    09/19/23 1939 -- -- 88 18 -- -- 80    09/19/23 1929 98.5 (36.9) Oral 80 16 121/76 Lying 90    09/19/23 1557 -- -- 78 16 136/76 Lying --    09/19/23 1530 -- -- 73 17 -- -- 93    09/19/23 1156 -- -- 80 18 117/77 Lying --    09/19/23 1110 -- -- 72 -- -- -- 96    09/19/23 1103 -- -- 72 16 -- -- 94    09/19/23 0745 -- -- 67 17 -- -- 93    09/19/23 0711 98.1 (36.7) Oral 67 18 134/83 Lying --    09/19/23 0600 -- -- 58 -- -- -- 93    09/19/23 0400 -- -- 65 -- -- -- 94    09/19/23 0322 98 (36.7) Oral -- 18 -- Lying --    09/19/23 0200 -- -- 72 -- -- -- 92    09/19/23 0000 -- -- 71 -- -- -- 93    09/18/23 2324 98 (36.7) Oral -- 18 151/81 Lying 92    09/18/23 2200 -- -- 76 -- -- -- 94    09/18/23 2020 -- -- 80 18 -- -- 91    09/18/23 2008 -- -- 82 -- -- -- 91    09/18/23 1937 98.8 (37.1) Oral -- 20 121/73 Lying --    09/18/23 1528 97.7 (36.5) Oral 85 20 125/62 Lying 91    09/18/23 1128 97.8 (36.6) Oral 91 20 129/75 Lying 80    09/18/23 0726 -- -- 65 -- -- -- 97    09/18/23 0725 97.7 (36.5) Oral 68 16 151/82 Lying 97    09/18/23 0724 -- -- 72 16 -- --  98    09/18/23 0401 -- -- -- -- 148/77 Lying --    09/18/23 0357 98.5 (36.9) Oral 80 16 166/89 Lying 95    09/17/23 2333 98.2 (36.8) Oral -- 18 124/69 Lying 90    09/17/23 2001 97.9 (36.6) Oral 87 18 146/71 Lying 92    09/17/23 1948 -- -- 80 18 -- -- 98    09/17/23 1559 -- -- 87 20 -- -- 88    09/17/23 1534 97.9 (36.6) Oral -- 20 130/92 Lying --    09/17/23 1223 97.8 (36.6) Oral -- 20 114/77 Lying --    09/17/23 1159 -- -- 75 20 -- -- 90    09/17/23 0726 -- -- 68 20 -- -- 93    09/17/23 0711 97.4 (36.3) Oral -- 20 125/93 Lying --    09/17/23 0402 94.9 (34.9) Oral 79 18 118/77 Standing 88    09/17/23 0401 94.9 (34.9) Oral 79 16 136/74 Sitting 89    09/17/23 0358 94.9 (34.9) Oral 79 14 133/90 Lying 90    09/17/23 0120 -- -- 68 -- 145/77 -- 93    09/17/23 0100 -- -- 68 -- 144/87 -- 93    09/17/23 0040 -- -- 67 -- 150/87 -- 91    09/17/23 0020 -- -- 70 -- 153/90 -- 90    09/17/23 0000 -- -- 71 -- 157/87 -- 91          Oxygen Therapy (last 3 days)       Date/Time SpO2 Device (Oxygen Therapy) Flow (L/min) Oxygen Concentration (%) ETCO2 (mmHg)    09/20/23 1117 93 humidified;nasal cannula 4 -- --    09/20/23 0800 -- humidified;nasal cannula 4 -- --    09/20/23 0757 98 humidified;nasal cannula 4 -- --    09/20/23 0725 97 nasal cannula 4 -- --    09/20/23 0600 95 nasal cannula 4 -- --    09/20/23 0508 96 nasal cannula -- -- --    09/20/23 0400 99 nasal cannula 4 -- --    09/20/23 0200 95 nasal cannula 4 -- --    09/20/23 0028 92 nasal cannula -- -- --    09/20/23 0000 94 humidified;nasal cannula 4 -- --    09/19/23 2201 92 humidified;nasal cannula 4 -- --    09/19/23 2139 91 humidified;nasal cannula 4 -- --    09/19/23 1939 80 humidified;nasal cannula 4 -- --    09/19/23 1929 90 humidified;nasal cannula -- -- --    09/19/23 1805 -- nasal cannula;humidified 3 -- --    09/19/23 1605 -- nasal cannula;humidified 3.5 -- --    09/19/23 1530 93 nasal cannula;humidified 3.5 -- --    09/19/23 1405 -- nasal cannula;humidified 3.5 -- --     09/19/23 1205 -- nasal cannula;humidified 3.5 -- --    09/19/23 1110 96 humidified;nasal cannula 3.5 -- --    09/19/23 1103 94 nasal cannula;humidified 4 -- --    09/19/23 1005 -- nasal cannula 4 -- --    09/19/23 0805 -- nasal cannula;humidified 4 -- --    09/19/23 0745 93 nasal cannula;humidified 4 -- --    09/19/23 0600 93 humidified;nasal cannula 4 -- --    09/19/23 0400 94 humidified;nasal cannula 4 -- --    09/19/23 0200 92 humidified;nasal cannula 4 -- --    09/19/23 0000 93 humidified;nasal cannula 4 -- --    09/18/23 2324 92 -- -- -- --    09/18/23 2200 94 humidified;nasal cannula 4 -- --    09/18/23 2020 91 humidified;nasal cannula 4 -- --    09/18/23 2010 -- humidified;nasal cannula 4 -- --    09/18/23 2008 91 humidified;nasal cannula 4 -- --    09/18/23 1805 -- nasal cannula;humidified 4 -- --    09/18/23 1605 -- nasal cannula;humidified 4 -- --    09/18/23 1528 91 humidified;nasal cannula 4 -- --    09/18/23 1405 -- nasal cannula;humidified 4 -- --    09/18/23 1205 -- nasal cannula;humidified 4 -- --    09/18/23 1128 80 nasal cannula;humidified 3 -- --    09/18/23 1005 -- nasal cannula;humidified 6 -- --    09/18/23 0805 -- nasal cannula;humidified 6 -- --    09/18/23 0726 97 -- -- -- --    09/18/23 0725 97 humidified;nasal cannula 6 -- --    09/18/23 0724 98 humidified;nasal cannula 6 -- --    09/18/23 0600 -- nasal cannula;humidified 6 -- --    09/18/23 0400 -- nasal cannula;heated 6 -- --    09/18/23 0357 95 humidified;nasal cannula 6 -- --    09/18/23 0300 -- nasal cannula;humidified 6 -- --    09/18/23 0200 -- nasal cannula;humidified 6 -- --    09/18/23 0000 -- humidified;nasal cannula 6 -- --    09/17/23 2333 90 humidified;nasal cannula 6 -- --    09/17/23 2200 -- humidified;nasal cannula 6 -- --    09/17/23 2001 92 humidified;nasal cannula 6 -- --    09/17/23 2000 -- humidified;nasal cannula 6 -- --    09/17/23 1948 98 humidified;nasal cannula 6 -- --    09/17/23 1800 -- nasal cannula 6 -- --     09/17/23 1600 -- nasal cannula;humidified 6 -- --    09/17/23 1559 88 nasal cannula;humidified 6 -- --    09/17/23 1400 -- humidified;nasal cannula 6 -- --    09/17/23 1200 -- humidified;nasal cannula 6 -- --    09/17/23 1159 90 nasal cannula;humidified 6 -- --    09/17/23 1000 -- nasal cannula;humidified 6 -- --    09/17/23 0800 -- nasal cannula;humidified 6 -- --    09/17/23 0726 93 nasal cannula;humidified 6 -- --    09/17/23 0600 -- humidified;nasal cannula 6 -- --    09/17/23 0402 88 -- -- -- --    09/17/23 0401 89 -- -- -- --    09/17/23 0400 -- humidified;nasal cannula 6 -- --    09/17/23 0358 90 -- -- -- --    09/17/23 0226 -- humidified;nasal cannula 6 -- --    09/17/23 0120 93 -- -- -- --    09/17/23 0100 93 -- -- -- --    09/17/23 0040 91 -- -- -- --    09/17/23 0020 90 -- -- -- --    09/17/23 0000 91 -- -- -- --          Lines, Drains & Airways       Active LDAs       Name Placement date Placement time Site Days    Peripheral IV 09/16/23 2009 Anterior;Left Forearm 09/16/23 2009  Forearm  3                  Current Facility-Administered Medications   Medication Dose Route Frequency Provider Last Rate Last Admin    !patient's home meds stored in pharmacy, please  prior to discharge   Does not apply Q12H Tian Lui, PharmD   Given at 09/20/23 0846    acetaminophen (TYLENOL) tablet 650 mg  650 mg Oral Q4H PRN Shaunna Lewis APRN   650 mg at 09/17/23 1701    Or    acetaminophen (TYLENOL) 160 MG/5ML oral solution 650 mg  650 mg Oral Q4H PRN Shaunna Lewis APRN        Or    acetaminophen (TYLENOL) suppository 650 mg  650 mg Rectal Q4H PRN Lewis, Shaunna R, APRN        albuterol (PROVENTIL) nebulizer solution 0.083% 2.5 mg/3mL  2.5 mg Nebulization 4x Daily - RT Monica Gupta DO   2.5 mg at 09/20/23 1117    sennosides-docusate (PERICOLACE) 8.6-50 MG per tablet 2 tablet  2 tablet Oral BID Shaunna Lewis, RAFA   2 tablet at 09/18/23 2003    And    polyethylene glycol (MIRALAX) packet  17 g  17 g Oral Daily PRN Shaunna Lewis APRN        And    bisacodyl (DULCOLAX) EC tablet 5 mg  5 mg Oral Daily PRN Shaunna Lewis APRN        And    bisacodyl (DULCOLAX) suppository 10 mg  10 mg Rectal Daily PRN Shaunna Lewis APRN        budesonide-formoterol (SYMBICORT) 160-4.5 MCG/ACT inhaler 2 puff  2 puff Inhalation BID - RT Shaunna Lewis APRN   2 puff at 09/20/23 0758    And    tiotropium (SPIRIVA RESPIMAT) 2.5 mcg/act aerosol solution inhaler  2 puff Inhalation Daily - RT Shaunna Lewis APRN   2 puff at 09/20/23 0800    calcium carbonate (TUMS) chewable tablet 500 mg (200 mg elemental)  2 tablet Oral BID PRN Shaunna Lewis APRN        Calcium Replacement - Follow Nurse / BPA Driven Protocol   Does not apply PRN Shaunna Lewis APRN        doxycycline (MONODOX) capsule 100 mg  100 mg Oral Q12H Shaunna Lewis APRN   100 mg at 09/20/23 0844    Enoxaparin Sodium (LOVENOX) syringe 30 mg  30 mg Subcutaneous Daily Shaunna Lewis APRN   30 mg at 09/20/23 0845    furosemide (LASIX) tablet 20 mg  20 mg Oral Daily Rebekah Simpson DO   20 mg at 09/20/23 0844    ipratropium-albuterol (DUO-NEB) nebulizer solution 3 mL  3 mL Nebulization Q4H PRN Shaunna Lewis APRN        Magnesium Standard Dose Replacement - Follow Nurse / BPA Driven Protocol   Does not apply PRN Shaunna Lewis APRN        multivitamin with minerals 1 tablet  1 tablet Oral Daily Shaunna Lewis APRN   1 tablet at 09/20/23 0844    oxyCODONE-acetaminophen (PERCOCET)  MG per tablet 1 tablet  1 tablet Oral Q4H PRN Monica Gupta DO   1 tablet at 09/19/23 2135    pancrelipase (Lip-Prot-Amyl) (CREON) capsule 36,000 units of lipase  36,000 units of lipase Oral TID With Meals Barbara Le, [facility]   36,000 units of lipase at 09/20/23 0845    Pharmacy Consult - MTM   Does not apply Daily Lashanda Houston, RPH   Given at 09/20/23 0846    Phosphorus Replacement - Follow Nurse / BPA Driven Protocol   Does not apply PRN  Shaunna Lewis APRN        Potassium Replacement - Follow Nurse / BPA Driven Protocol   Does not apply PRN Shaunna Lewis APRN        predniSONE (DELTASONE) tablet 20 mg  20 mg Oral Daily Case, Rebekah V., DO   20 mg at 09/20/23 0844    Followed by    [START ON 9/23/2023] predniSONE (DELTASONE) tablet 15 mg  15 mg Oral Daily Case, Rebekah V., DO        Followed by    [START ON 9/26/2023] predniSONE (DELTASONE) tablet 10 mg  10 mg Oral Daily Case, Rebekah V., DO        Followed by    [START ON 9/29/2023] predniSONE (DELTASONE) tablet 5 mg  5 mg Oral Daily Case, Rebekah V., DO        sodium chloride 0.9 % flush 10 mL  10 mL Intravenous PRN Myesha Dick PA-C        sodium chloride 0.9 % flush 10 mL  10 mL Intravenous Q12H Shaunna Lewis APRN   10 mL at 09/20/23 0845    sodium chloride 0.9 % flush 10 mL  10 mL Intravenous PRN Shaunna Lewis APRN        sodium chloride 0.9 % infusion 40 mL  40 mL Intravenous PRN Shaunna Lewis APRN         Imaging Results (Last 72 Hours)       Procedure Component Value Units Date/Time    CT Head Without Contrast [[id number]] Collected: 09/17/23 1412     Updated: 09/17/23 1417    Narrative:      CT HEAD WO CONTRAST    Date of Exam: 9/17/2023 1:47 PM EDT    Indication: Syncope/presyncope, cerebrovascular cause suspected.    Comparison: None available.    Technique: Axial CT images were obtained of the head without contrast administration.  Automated exposure control and iterative construction methods were used.    FINDINGS: Gray-white differentiation is maintained and there is no evidence of intracranial hemorrhage, mass or mass effect. Age-related changes of the brain are present including volume loss and typical periventricular sequela of chronic small vessel   ischemia. There is otherwise no evidence of intracranial hemorrhage, mass or mass effect. The ventricles are normal in size and configuration accounting for surrounding volume loss. The orbits are normal and the  paranasal sinuses are grossly clear. There   is a prominent chronic appearing left mastoid effusion in addition to soft tissue density present within both external auditory canals, possible cerumen.      Impression:      Age-related changes of the brain as above, otherwise without evidence of acute intracranial abnormality.      Electronically Signed: Broderick Reynoso MD    9/17/2023 2:14 PM EDT    Workstation ID: [id number]          ECG/EMG Results (last 72 hours)       Procedure Component Value Units Date/Time    Adult Transthoracic Echo Complete With Contrast if Necessary Per Protocol [[id number]] Resulted: 09/17/23 1212     Updated: 09/17/23 1214     EF(MOD-bp) 54.4 %      LVIDd 4.0 cm      LVIDs 2.9 cm      IVSd 0.70 cm      LVPWd 0.70 cm      FS 27.5 %      IVS/LVPW 1.00 cm      ESV(cubed) 24.4 ml      EDV(cubed) 64.0 ml      LVOT area 3.5 cm2      LV mass(C)d 78.4 grams      LVOT diam 2.10 cm      EDV(MOD-sp2) 60.5 ml      EDV(MOD-sp4) 87.8 ml      ESV(MOD-sp2) 30.6 ml      ESV(MOD-sp4) 35.2 ml      SV(MOD-sp2) 29.9 ml      SV(MOD-sp4) 52.6 ml      EF(MOD-sp2) 49.4 %      EF(MOD-sp4) 59.9 %      MV E max juaquin 42.3 cm/sec      MV A max juaquin 80.0 cm/sec      MV dec time 0.18 msec      MV E/A 0.53     LA ESV Index (BP) 27.4 ml/m2      Med Peak E' Juaquin 4.6 cm/sec      Lat Peak E' Juaquin 5.1 cm/sec      Avg E/e' ratio 8.72     SV(LVOT) 54.7 ml      RV Base 4.0 cm      RV Mid 3.1 cm      RV Length 6.5 cm      TAPSE (>1.6) 1.95 cm      RV S' 11.3 cm/sec      LA dimension (2D)  3.7 cm      LV V1 max 81.7 cm/sec      LV V1 max PG 2.7 mmHg      LV V1 mean PG 1.00 mmHg      LV V1 VTI 15.8 cm      Ao pk juaquin 130.0 cm/sec      Ao max PG 6.8 mmHg      Ao mean PG 4.0 mmHg      Ao V2 VTI 26.6 cm      BOY(I,D) 2.06 cm2      AI P1/2t 640.2 msec      MV max PG 2.7 mmHg      MV mean PG 1.00 mmHg      MV V2 VTI 20.1 cm      MV P1/2t 60.3 msec      MVA(P1/2t) 3.6 cm2      MVA(VTI) 2.7 cm2      MV dec slope 219.0 cm/sec2      TR max juaquin 390.0  cm/sec      TR max PG 60.8 mmHg      PA V2 max 107.0 cm/sec      PA acc time 0.11 sec      PI end-d juaquin 114.0 cm/sec      Ao root diam 2.7 cm      RAP systole 15 mmHg      RVSP(TR) 76 mmHg     Narrative:        Left ventricular systolic function is normal. Calculated left   ventricular EF = 54.4%    Left ventricular diastolic function is consistent with (grade I)   impaired relaxation.    The right ventricular cavity is mildly dilated.    The right atrial cavity is mildly  dilated.    Moderate tricuspid valve regurgitation is present.    Estimated right ventricular systolic pressure from tricuspid   regurgitation is markedly elevated (>55 mmHg). Calculated right   ventricular systolic pressure from tricuspid regurgitation is 76 mmHg.    Severe pulmonary hypertension is present.      ECG 12 Lead Dyspnea [[id number]] Collected: 09/17/23 0515     Updated: 09/17/23 1301     QT Interval 440 ms      QTC Interval 478 ms     Narrative:      Test Reason : Dyspnea  Blood Pressure :   */*   mmHG  Vent. Rate :  71 BPM     Atrial Rate :  71 BPM     P-R Int : 120 ms          QRS Dur :  88 ms      QT Int : 440 ms       P-R-T Axes :  73  93  69 degrees     QTc Int : 478 ms    Normal sinus rhythm  Rightward axis  Borderline ECG  When compared with ECG of 16-SEP-2023 23:17, (Unconfirmed)  Incomplete right bundle branch block is no longer present  Criteria for Septal infarct are no longer present  Confirmed by MD Joseph, Emmanuel ([id number]) on 9/17/2023 1:00:20 PM    Referred By: elizabeth elsie           Confirmed By: Emmanuel Ruiz MD    ECG 12 Lead Dyspnea [[id number]] Collected: 09/16/23 2014     Updated: 09/17/23 1839     QT Interval 428 ms      QTC Interval 477 ms     Narrative:      Test Reason : Dyspnea  Blood Pressure :   */*   mmHG  Vent. Rate :  75 BPM     Atrial Rate :  75 BPM     P-R Int : 112 ms          QRS Dur :  98 ms      QT Int : 428 ms       P-R-T Axes :  66  91  76 degrees     QTc Int : 477 ms    Sinus rhythm with premature  supraventricular complexes  Possible Left atrial enlargement  Rightward axis  Pulmonary disease pattern  Incomplete right bundle branch block  Abnormal ECG  When compared with ECG of 17-JUL-2023 15:27,  premature supraventricular complexes are now present  Nonspecific T wave abnormality no longer evident in Inferior leads  Confirmed by MD Ruiz Robert ([id number]) on 9/17/2023 6:38:48 PM    Referred By:            Confirmed By: Emmanuel Ruiz MD    ECG 12 Lead Dyspnea [[id number]] Collected: 09/16/23 2317     Updated: 09/17/23 1839     QT Interval 436 ms      QTC Interval 473 ms     Narrative:      Test Reason : Dyspnea  Blood Pressure :   */*   mmHG  Vent. Rate :  71 BPM     Atrial Rate :  71 BPM     P-R Int : 120 ms          QRS Dur :  92 ms      QT Int : 436 ms       P-R-T Axes :  63  98  52 degrees     QTc Int : 473 ms    Normal sinus rhythm  Possible Left atrial enlargement  Rightward axis  Incomplete right bundle branch block  Septal infarct , age undetermined  Abnormal ECG  When compared with ECG of 16-SEP-2023 20:14, (Unconfirmed)  premature supraventricular complexes are no longer present  Septal infarct is now present  Nonspecific T wave abnormality now evident in Inferior leads  Confirmed by MD Ruiz Robert ([id number]) on 9/17/2023 6:38:57 PM    Referred By:            Confirmed By: Emmanuel Ruiz MD             Physician Progress Notes (last 72 hours)        Case, Rebekah HENSONGloria  at 09/19/23 1401            INPATIENT PULMONARY SERVICE  PROGRESS NOTE     Hospital LOS: 2 days    Ms. Mary West, is followed for a Chief Complaint of:  Chief Complaint   Patient presents with    Shortness of Breath       COPD with acute exacerbation    Acute on chronic respiratory failure with hypoxia and hypercapnia    Chronic Pancreatitis    COPD exacerbation    Severe malnutrition    COPD (chronic obstructive pulmonary disease)    Hypokalemia    Elevated troponin    Syncope      Subjective   S     Interval History:  No acute  events. On 4L nasal cannula. Desaturation with exertion.        The patient's relevant past medical, surgical and social history were reviewed and updated in Epic as appropriate.      ROS:   Constitutional: Negative for fever.   Respiratory: Positive for dyspnea.   Cardiovascular: Negative for chest pain.   Gastrointestinal: Negative for  nausea, vomiting and diarrhea.     Objective   O     Vitals  Temp  Min: 97.7 °F (36.5 °C)  Max: 98.8 °F (37.1 °C)  BP  Min: 117/77  Max: 151/81  Pulse  Min: 58  Max: 85  Resp  Min: 16  Max: 20  SpO2  Min: 91 %  Max: 96 % Flow (L/min)  Min: 3.5  Max: 4    I/O 24 HR (7:00 AM-6:59AM):  Intake/Output       None            Medications (Drips):       Physical Examination    Telemetry: Normal sinus rhythm.    Constitutional:  No acute distress.  Conversant. Sitting up in bed on nasal cannula.    Cardiovascular: Regular rate and rhythm.  No murmurs, rub or gallop.   Respiratory: Normal symmetric chest expansion.  Normal respiratory effort.  Diminished bilaterally. No wheezing.    Abdominal:  Soft. No masses.   Non-tender. No distension.   No hepatosplenomegaly.   Extremities: No digital cyanosis or clubbing.  No peripheral edema.   Neurological:   Alert and Oriented to person, place, and time.   Moves all extremities.           Results from last 7 days   Lab Units 09/18/23 0326 09/17/23 0801 09/16/23 2010   WBC 10*3/mm3 9.97 4.86 8.27   HEMOGLOBIN g/dL 13.7 14.2 13.6   MCV fL 103.4* 103.0* 103.6*   PLATELETS 10*3/mm3 215 225 232     Results from last 7 days   Lab Units 09/18/23 0326 09/17/23 0801 09/16/23 2010   SODIUM mmol/L 142 144 146*   POTASSIUM mmol/L 5.2 4.3 3.3*   CO2 mmol/L 29.0 28.0 30.0*   CREATININE mg/dL 0.46* 0.68 0.55*   MAGNESIUM mg/dL  --  1.7  --    PHOSPHORUS mg/dL  --  3.4  --      Serum creatinine: 0.46 mg/dL (L) 09/18/23 0326  Estimated creatinine clearance: 75.3 mL/min (A)  Results from last 7 days   Lab Units 09/16/23 2010   ALK PHOS U/L 82   BILIRUBIN mg/dL  0.6   ALT (SGPT) U/L 10   AST (SGOT) U/L 22       Results from last 7 days   Lab Units 09/16/23 2039   PH, ARTERIAL pH units 7.435   PCO2, ARTERIAL mm Hg 45.5*   PO2 ART mm Hg 71.4*   FIO2 % 44       Images:     Imaging Results (Last 24 Hours)       ** No results found for the last 24 hours. **            I reviewed the patient's new clinical results.  I reviewed the patient's new imaging results and agree with the interpretation.    Assessment & Plan   A / P     Ms. West is a 62yo F with a history of COPD on 2L home O2 and ongoing tobacco use who presented to the [facility] ED on 9/17/23 with worsening shortness of breath.      CTA of the chest in the ED was negative for PE. She was noted to have severe emphysema and small bilateral pleural effusions.      BNP was elevated at 7481.      She was admitted to Hospital Medicine and started on Doxycycline and Solumedrol for COPD exacerbation.      She does not follow with Pulmonary Medicine. We do not have any PFTs available to assess the severity of her COPD.      Since admission, she has continued to require 6L nasal cannula.      Echocardiogram is significant for HFpEF as well a RV dilation and RVSP of 76mmHg suggestive of Cor Pulmonale secondary to severe lung disease and diastolic dysfunction.     Diet: Regular/House Diet; Texture: Regular Texture (IDDSI 7); Fluid Consistency: Thin (IDDSI 0)  Code Status and Medical Interventions:   Ordered at: 09/17/23 0159     Level Of Support Discussed With:    Patient     Code Status (Patient has no pulse and is not breathing):    CPR (Attempt to Resuscitate)     Medical Interventions (Patient has pulse or is breathing):    Full Support       Active Hospital Problems    Diagnosis     **COPD with acute exacerbation     COPD (chronic obstructive pulmonary disease)     Hypokalemia     Elevated troponin     Syncope     Severe malnutrition     COPD exacerbation     Acute on chronic respiratory failure with hypoxia and hypercapnia      Chronic Pancreatitis        Assessment / Plan:  Convert Solumedrol to Prednisone with taper.   Lasix 40mg PO daily.   Wean supplemental oxygen as tolerated.   Complete a course of Doxycycline.   Continue Symbicort and Spiriva for now. She may benefit from changing to a nebulized regimen if she continues to have no significant improvement.   She will need to follow up in Pulmonary Clinic to obtain Full PFTs. We will not perform these as an inpatient while she is acutely ill.     I discussed the patient's findings and my recommendations with patient      Rebekah Simpson DO  Pulmonary and Critical Care Medicine              Electronically signed by Rebekah Simpson DO at 23 [phone]       Monica Gupta DO at 23 1239              [facility] Medicine Services  PROGRESS NOTE    Patient Name: Mary West  : 1960  MRN: [id number]    Date of Admission: 2023  Primary Care Physician: Provider, No Known    Subjective   Subjective     CC:  F/U AE COPD    HPI:  Patient seen and examined. Sitting up in bed. No new complaints. Hopeful to go home soon.    Objective   Objective     Vital Signs:   Temp:  [97.7 °F (36.5 °C)-98.8 °F (37.1 °C)] 98.1 °F (36.7 °C)  Heart Rate:  [58-85] 80  Resp:  [16-20] 18  BP: (117-151)/(62-83) 117/77  Flow (L/min):  [3.5-4] 3.5     Physical Exam:  Constitutional: No acute distress, awake, alert, appears older than stated age  HENT: NCAT, mucous membranes moist  Respiratory: Decreased in bases, respiratory effort normal, exp wheezes, stable on 3.5L NC  Cardiovascular: RRR, no murmurs, rubs, or gallops  Gastrointestinal: Positive bowel sounds, soft, nontender, nondistended  Musculoskeletal: No bilateral ankle edema  Psychiatric: Appropriate affect, cooperative  Neurologic: Oriented x 3, GOODWIN, speech clear  Skin: No rashes     Results Reviewed:  LAB RESULTS:      Lab 23  0326 23  0801 23   WBC 9.97 4.86 8.27   HEMOGLOBIN 13.7 14.2  13.6   HEMATOCRIT 42.2 44.0 42.6   PLATELETS 215 225 232   NEUTROS ABS  --  4.16 5.48   IMMATURE GRANS (ABS)  --  0.01 0.03   LYMPHS ABS  --  0.59* 2.10   MONOS ABS  --  0.09* 0.51   EOS ABS  --  0.00 0.10   .4* 103.0* 103.6*   CRP  --   --  <0.30   PROCALCITONIN  --   --  0.04   LACTATE  --   --  2.0   LDH  --   --  244*   D DIMER QUANT  --   --  1.11*         Lab 09/18/23  0326 09/17/23  0801 09/16/23 2010   SODIUM 142 144 146*   POTASSIUM 5.2 4.3 3.3*   CHLORIDE 106 106 106   CO2 29.0 28.0 30.0*   ANION GAP 7.0 10.0 10.0   BUN 20 15 13   CREATININE 0.46* 0.68 0.55*   EGFR 107.7 98.0 103.1   GLUCOSE 170* 197* 93   CALCIUM 8.9 8.8 9.2   MAGNESIUM  --  1.7  --    PHOSPHORUS  --  3.4  --          Lab 09/16/23 2010   TOTAL PROTEIN 6.3   ALBUMIN 4.1   GLOBULIN 2.2   ALT (SGPT) 10   AST (SGOT) 22   BILIRUBIN 0.6   ALK PHOS 82         Lab 09/17/23  1003 09/17/23  0801 09/17/23  0029 09/16/23 2010   PROBNP  --   --   --  7,481.0*   HSTROP T 13* 14* 20* 24*             Lab 09/18/23  1204   FOLATE 11.70   VITAMIN B 12 677         Lab 09/16/23 2039   PH, ARTERIAL 7.435   PCO2, ARTERIAL 45.5*   PO2 ART 71.4*   FIO2 44   HCO3 ART 30.6*   BASE EXCESS ART 5.5*   CARBOXYHEMOGLOBIN 3.9*     Brief Urine Lab Results       None            Microbiology Results Abnormal       Procedure Component Value - Date/Time    COVID PRE-OP / PRE-PROCEDURE SCREENING ORDER (NO ISOLATION) - Swab, Nasopharynx [[id number]]  (Normal) Collected: 09/16/23 2021    Lab Status: Final result Specimen: Swab from Nasopharynx Updated: 09/16/23 2119    Narrative:      The following orders were created for panel order COVID PRE-OP / PRE-PROCEDURE SCREENING ORDER (NO ISOLATION) - Swab, Nasopharynx.  Procedure                               Abnormality         Status                     ---------                               -----------         ------                     Respiratory Panel PCR w/...[[id number]]  Normal              Final result                  Please view results for these tests on the individual orders.    Respiratory Panel PCR w/COVID-19(SARS-CoV-2) JONAS/RINA/NAFISA/PAD/COR/MAD/SALOME In-House, NP Swab in UTM/VTM, 3-4 HR TAT - Swab, Nasopharynx [[id number]]  (Normal) Collected: 09/16/23 2021    Lab Status: Final result Specimen: Swab from Nasopharynx Updated: 09/16/23 2119     ADENOVIRUS, PCR Not Detected     Coronavirus 229E Not Detected     Coronavirus HKU1 Not Detected     Coronavirus NL63 Not Detected     Coronavirus OC43 Not Detected     COVID19 Not Detected     Human Metapneumovirus Not Detected     Human Rhinovirus/Enterovirus Not Detected     Influenza A PCR Not Detected     Influenza B PCR Not Detected     Parainfluenza Virus 1 Not Detected     Parainfluenza Virus 2 Not Detected     Parainfluenza Virus 3 Not Detected     Parainfluenza Virus 4 Not Detected     RSV, PCR Not Detected     Bordetella pertussis pcr Not Detected     Bordetella parapertussis PCR Not Detected     Chlamydophila pneumoniae PCR Not Detected     Mycoplasma pneumo by PCR Not Detected    Narrative:      In the setting of a positive respiratory panel with a viral infection PLUS a negative procalcitonin without other underlying concern for bacterial infection, consider observing off antibiotics or discontinuation of antibiotics and continue supportive care. If the respiratory panel is positive for atypical bacterial infection (Bordetella pertussis, Chlamydophila pneumoniae, or Mycoplasma pneumoniae), consider antibiotic de-escalation to target atypical bacterial infection.            CT Head Without Contrast    Result Date: 9/17/2023  CT HEAD WO CONTRAST Date of Exam: 9/17/2023 1:47 PM EDT Indication: Syncope/presyncope, cerebrovascular cause suspected. Comparison: None available. Technique: Axial CT images were obtained of the head without contrast administration.  Automated exposure control and iterative construction methods were used. FINDINGS: Gray-white differentiation is  maintained and there is no evidence of intracranial hemorrhage, mass or mass effect. Age-related changes of the brain are present including volume loss and typical periventricular sequela of chronic small vessel ischemia. There is otherwise no evidence of intracranial hemorrhage, mass or mass effect. The ventricles are normal in size and configuration accounting for surrounding volume loss. The orbits are normal and the paranasal sinuses are grossly clear. There  is a prominent chronic appearing left mastoid effusion in addition to soft tissue density present within both external auditory canals, possible cerumen.     Impression: Age-related changes of the brain as above, otherwise without evidence of acute intracranial abnormality. Electronically Signed: Broderick Reynoso MD  9/17/2023 2:14 PM EDT  Workstation ID: [id number]     Results for orders placed during the hospital encounter of 09/16/23    Adult Transthoracic Echo Complete With Contrast if Necessary Per Protocol    Interpretation Summary    Left ventricular systolic function is normal. Calculated left ventricular EF = 54.4%    Left ventricular diastolic function is consistent with (grade I) impaired relaxation.    The right ventricular cavity is mildly dilated.    The right atrial cavity is mildly  dilated.    Moderate tricuspid valve regurgitation is present.    Estimated right ventricular systolic pressure from tricuspid regurgitation is markedly elevated (>55 mmHg). Calculated right ventricular systolic pressure from tricuspid regurgitation is 76 mmHg.    Severe pulmonary hypertension is present.      Current medications:  Scheduled Meds:Pharmacy Consult, , Does not apply, Q12H  Albuterol Sulfate NEB Orderable, 2.5 mg, Nebulization, 4x Daily - RT  budesonide-formoterol, 2 puff, Inhalation, BID - RT   And  tiotropium bromide monohydrate, 2 puff, Inhalation, Daily - RT  doxycycline, 100 mg, Oral, Q12H  enoxaparin, 30 mg, Subcutaneous, Daily  methylPREDNISolone  sodium succinate, 40 mg, Intravenous, Q8H  multivitamin with minerals, 1 tablet, Oral, Daily  Pancrelipase (Lip-Prot-Amyl), 36,000 units of lipase, Oral, TID With Meals  pharmacy consult - [place], , Does not apply, Daily  senna-docusate sodium, 2 tablet, Oral, BID  sodium chloride, 10 mL, Intravenous, Q12H      Continuous Infusions:   PRN Meds:.  acetaminophen **OR** acetaminophen **OR** acetaminophen    senna-docusate sodium **AND** polyethylene glycol **AND** bisacodyl **AND** bisacodyl    calcium carbonate    Calcium Replacement - Follow Nurse / BPA Driven Protocol    ipratropium-albuterol    Magnesium Standard Dose Replacement - Follow Nurse / BPA Driven Protocol    oxyCODONE-acetaminophen    Phosphorus Replacement - Follow Nurse / BPA Driven Protocol    Potassium Replacement - Follow Nurse / BPA Driven Protocol    [COMPLETED] Insert Peripheral IV **AND** sodium chloride    sodium chloride    sodium chloride    Assessment & Plan   Assessment & Plan     Active Hospital Problems    Diagnosis  POA    **COPD with acute exacerbation [J44.1]  Yes    COPD (chronic obstructive pulmonary disease) [J44.9]  Yes    Hypokalemia [E87.6]  Yes    Elevated troponin [R77.8]  Yes    Syncope [R55]  Yes    Severe malnutrition [E43]  Yes    COPD exacerbation [J44.1]  Yes    Acute on chronic respiratory failure with hypoxia and hypercapnia [J96.21, J96.22]  Yes    Chronic Pancreatitis [K85.90]  Yes      Resolved Hospital Problems   No resolved problems to display.        Brief Hospital Course to date:  Mary West is a 63 y.o. female with PMH significant for COPD with chronic respiratory failure on 4 LNC, chronic pancreatitis, malnutrition, who presents to the ED with complaint of shortness of breath and syncope who was found to have concern for COPD with acute exacerbation.    This patient's problems and plans were partially entered by my partner and updated as appropriate by me 09/19/23.      Acute on chronic respiratory failure  with hypercapnia and hypoxia  COPD exacerbation  Pulmonary HTN   HFpEF   - Baseline supplemental oxygen 2-4L NC  - Scheduled and as needed DuoNebs  - Continue Symbicort/Spiriva   - Wean Solu-Medrol to 40mg IV q12  - COPD navigator follows   - Pulmonology following, appreciate assistance   - Continue Doxycycline  - Will need a nebulizer machine for home  - S/P Lasix 40mg IV q12 x 2 doses   -Echo with EF 54.5%, grade 1 diastolic dysfunction, mod TVR, RVSP>55, severe pulmonary HTN     Syncope  - Orthostatic BP negative   - Likely related to hypoxia  - Echo as above  - Fall precautions  - CT head negative     Elevated troponin  Elevated proBNP  - Troponin trending down  - Denies chest pain  - Echo as above  -- S/P IV Lasix x2 doses     Hypokalemia  - Electrolyte replacement per protocol      Chronic pancreatitis  - Continue Creon     Severe malnutrition  - RD following      Tobacco abuse  - Reports longer smoking, but now using vapes  - Encouraged cessation    Macrocytosis   -B12, Folate ok     Expected Discharge Location and Transportation: Home  Expected Discharge   Expected Discharge Date: 2023; Expected Discharge Time:      DVT prophylaxis:  Medical DVT prophylaxis orders are present.     AM-PAC 6 Clicks Score (PT): 22 (23 0900)    CODE STATUS:   Code Status and Medical Interventions:   Ordered at: 23 0159     Level Of Support Discussed With:    Patient     Code Status (Patient has no pulse and is not breathing):    CPR (Attempt to Resuscitate)     Medical Interventions (Patient has pulse or is breathing):    Full Support       Monica Gupta DO  23        Electronically signed by Monica Gupta DO at 23 1244       Monica Gupta DO at 23 1156              [facility] Medicine Services  PROGRESS NOTE    Patient Name: Mary West  : 1960  MRN: [id number]    Date of Admission: 2023  Primary Care Physician: Provider, No  Known    Subjective   Subjective     CC:  F/U AE COPD    HPI:  Patient seen and examined. States she feels better. Still on 6L NC.     Objective   Objective     Vital Signs:   Temp:  [97.7 °F (36.5 °C)-98.5 °F (36.9 °C)] 97.8 °F (36.6 °C)  Heart Rate:  [65-91] 91  Resp:  [16-20] 20  BP: (114-166)/(69-92) 129/75  Flow (L/min):  [3-6] 3     Physical Exam:  Constitutional: No acute distress, awake, alert, appears older than stated age  HENT: NCAT, mucous membranes moist  Respiratory: Decreased in bases, respiratory effort normal, exp wheezes  Cardiovascular: RRR, no murmurs, rubs, or gallops  Gastrointestinal: Positive bowel sounds, soft, nontender, nondistended  Musculoskeletal: No bilateral ankle edema  Psychiatric: Appropriate affect, cooperative  Neurologic: Oriented x 3, GOODWIN, speech clear  Skin: No rashes     Results Reviewed:  LAB RESULTS:      Lab 09/18/23 0326 09/17/23  0801 09/16/23 2010   WBC 9.97 4.86 8.27   HEMOGLOBIN 13.7 14.2 13.6   HEMATOCRIT 42.2 44.0 42.6   PLATELETS 215 225 232   NEUTROS ABS  --  4.16 5.48   IMMATURE GRANS (ABS)  --  0.01 0.03   LYMPHS ABS  --  0.59* 2.10   MONOS ABS  --  0.09* 0.51   EOS ABS  --  0.00 0.10   .4* 103.0* 103.6*   CRP  --   --  <0.30   PROCALCITONIN  --   --  0.04   LACTATE  --   --  2.0   LDH  --   --  244*   D DIMER QUANT  --   --  1.11*         Lab 09/18/23 0326 09/17/23  0801 09/16/23 2010   SODIUM 142 144 146*   POTASSIUM 5.2 4.3 3.3*   CHLORIDE 106 106 106   CO2 29.0 28.0 30.0*   ANION GAP 7.0 10.0 10.0   BUN 20 15 13   CREATININE 0.46* 0.68 0.55*   EGFR 107.7 98.0 103.1   GLUCOSE 170* 197* 93   CALCIUM 8.9 8.8 9.2   MAGNESIUM  --  1.7  --    PHOSPHORUS  --  3.4  --          Lab 09/16/23 2010   TOTAL PROTEIN 6.3   ALBUMIN 4.1   GLOBULIN 2.2   ALT (SGPT) 10   AST (SGOT) 22   BILIRUBIN 0.6   ALK PHOS 82         Lab 09/17/23  1003 09/17/23  0801 09/17/23  0029 09/16/23 2010   PROBNP  --   --   --  7,481.0*   HSTROP T 13* 14* 20* 24*                 Lab  09/16/23 2039   PH, ARTERIAL 7.435   PCO2, ARTERIAL 45.5*   PO2 ART 71.4*   FIO2 44   HCO3 ART 30.6*   BASE EXCESS ART 5.5*   CARBOXYHEMOGLOBIN 3.9*     Brief Urine Lab Results       None            Microbiology Results Abnormal       Procedure Component Value - Date/Time    COVID PRE-OP / PRE-PROCEDURE SCREENING ORDER (NO ISOLATION) - Swab, Nasopharynx [[id number]]  (Normal) Collected: 09/16/23 2021    Lab Status: Final result Specimen: Swab from Nasopharynx Updated: 09/16/23 2119    Narrative:      The following orders were created for panel order COVID PRE-OP / PRE-PROCEDURE SCREENING ORDER (NO ISOLATION) - Swab, Nasopharynx.  Procedure                               Abnormality         Status                     ---------                               -----------         ------                     Respiratory Panel PCR w/...[[id number]]  Normal              Final result                 Please view results for these tests on the individual orders.    Respiratory Panel PCR w/COVID-19(SARS-CoV-2) JONAS/RINA/NAFISA/PAD/COR/MAD/SALOME In-House, NP Swab in UTM/VTM, 3-4 HR TAT - Swab, Nasopharynx [[id number]]  (Normal) Collected: 09/16/23 2021    Lab Status: Final result Specimen: Swab from Nasopharynx Updated: 09/16/23 2119     ADENOVIRUS, PCR Not Detected     Coronavirus 229E Not Detected     Coronavirus HKU1 Not Detected     Coronavirus NL63 Not Detected     Coronavirus OC43 Not Detected     COVID19 Not Detected     Human Metapneumovirus Not Detected     Human Rhinovirus/Enterovirus Not Detected     Influenza A PCR Not Detected     Influenza B PCR Not Detected     Parainfluenza Virus 1 Not Detected     Parainfluenza Virus 2 Not Detected     Parainfluenza Virus 3 Not Detected     Parainfluenza Virus 4 Not Detected     RSV, PCR Not Detected     Bordetella pertussis pcr Not Detected     Bordetella parapertussis PCR Not Detected     Chlamydophila pneumoniae PCR Not Detected     Mycoplasma pneumo by PCR Not Detected    Narrative:       In the setting of a positive respiratory panel with a viral infection PLUS a negative procalcitonin without other underlying concern for bacterial infection, consider observing off antibiotics or discontinuation of antibiotics and continue supportive care. If the respiratory panel is positive for atypical bacterial infection (Bordetella pertussis, Chlamydophila pneumoniae, or Mycoplasma pneumoniae), consider antibiotic de-escalation to target atypical bacterial infection.            Adult Transthoracic Echo Complete With Contrast if Necessary Per Protocol    Result Date: 9/17/2023    Left ventricular systolic function is normal. Calculated left ventricular EF = 54.4%   Left ventricular diastolic function is consistent with (grade I) impaired relaxation.   The right ventricular cavity is mildly dilated.   The right atrial cavity is mildly  dilated.   Moderate tricuspid valve regurgitation is present.   Estimated right ventricular systolic pressure from tricuspid regurgitation is markedly elevated (>55 mmHg). Calculated right ventricular systolic pressure from tricuspid regurgitation is 76 mmHg.   Severe pulmonary hypertension is present.     CT Head Without Contrast    Result Date: 9/17/2023  CT HEAD WO CONTRAST Date of Exam: 9/17/2023 1:47 PM EDT Indication: Syncope/presyncope, cerebrovascular cause suspected. Comparison: None available. Technique: Axial CT images were obtained of the head without contrast administration.  Automated exposure control and iterative construction methods were used. FINDINGS: Gray-white differentiation is maintained and there is no evidence of intracranial hemorrhage, mass or mass effect. Age-related changes of the brain are present including volume loss and typical periventricular sequela of chronic small vessel ischemia. There is otherwise no evidence of intracranial hemorrhage, mass or mass effect. The ventricles are normal in size and configuration accounting for surrounding  volume loss. The orbits are normal and the paranasal sinuses are grossly clear. There  is a prominent chronic appearing left mastoid effusion in addition to soft tissue density present within both external auditory canals, possible cerumen.     Impression: Age-related changes of the brain as above, otherwise without evidence of acute intracranial abnormality. Electronically Signed: Broderick Reynoso MD  9/17/2023 2:14 PM EDT  Workstation ID: [id number]    XR Chest 1 View    Result Date: 9/16/2023  XR CHEST 1 VW Date of Exam: 9/16/2023 8:40 PM EDT Indication: shortness of breath Comparison: Chest CT 7/17/2023 and chest radiograph same date. Findings: There is diffuse interstitial prominence and there are cystic areas of lucency in both lungs. There are stable small bilateral pleural effusions. There is improved left midlung airspace disease. No definite new focal lung opacity. No pneumothorax. Heart size is unchanged. Bones are demineralized. No definite acute osseous abnormality. Stable mild lower thoracic compression deformity.     Impression: Impression: 1.Improved left midlung airspace disease. 2.Stable small bilateral pleural effusions. 3.Stable chronic changes in the lungs. Electronically Signed: Bishop Anand MD  9/16/2023 9:17 PM EDT  Workstation ID: [id number]    CT Angiogram Chest    Result Date: 9/16/2023  CT ANGIOGRAM CHEST Date of Exam: 9/16/2023 11:26 PM EDT Indication: SOA, hypoxia, positive D Dimer, r/o PE. Comparison: 7/17/2023. Technique: CTA of the chest was performed after the uneventful intravenous administration of 75 mL Isovue-370. Reconstructed coronal and sagittal images were also obtained. In addition, a 3-D volume rendered image was created for interpretation. Automated exposure control and iterative reconstruction methods were used. Findings: There is no evidence of pulmonary embolus. Stable small bilateral pleural effusions, slightly larger on the right. Stable severe emphysema. Interlobular  septal thickening may represent mild superimposed interstitial edema. There is stable scarring along the posterior left lower lobe superiorly. There is no pneumothorax or lobar consolidation. Airways appear patent. Thyroid is not well seen. There is mild aortic atherosclerosis. The heart is overall normal size, but the right ventricle appears enlarged. The effusion. There is mild coronary artery calcification. No mediastinal lymphadenopathy. No acute findings in the superficial soft tissues. No acute findings in the limited evaluation of the upper abdomen. Patient is status post cholecystectomy. There is mild pneumobilia. This is unchanged. There are no acute osseous abnormalities or destructive bone lesions. There is a stable mild compression deformity at T12. Bones are demineralized. Stable ununited chronic left lateral eighth rib fracture.     Impression: Impression: 1.No evidence of pulmonary embolus. 2.Stable small bilateral pleural effusions, slightly larger on the right. 3.Stable severe emphysema. 4.Stable enlargement of the right ventricle and mild coronary artery calcification. Electronically Signed: Bishop Anand MD  9/16/2023 11:52 PM EDT  Workstation ID: [id number]     Results for orders placed during the hospital encounter of 09/16/23    Adult Transthoracic Echo Complete With Contrast if Necessary Per Protocol    Interpretation Summary    Left ventricular systolic function is normal. Calculated left ventricular EF = 54.4%    Left ventricular diastolic function is consistent with (grade I) impaired relaxation.    The right ventricular cavity is mildly dilated.    The right atrial cavity is mildly  dilated.    Moderate tricuspid valve regurgitation is present.    Estimated right ventricular systolic pressure from tricuspid regurgitation is markedly elevated (>55 mmHg). Calculated right ventricular systolic pressure from tricuspid regurgitation is 76 mmHg.    Severe pulmonary hypertension is  present.      Current medications:  Scheduled Meds:Pharmacy Consult, , Does not apply, Q12H  Albuterol Sulfate NEB Orderable, 2.5 mg, Nebulization, 4x Daily - RT  budesonide-formoterol, 2 puff, Inhalation, BID - RT   And  tiotropium bromide monohydrate, 2 puff, Inhalation, Daily - RT  doxycycline, 100 mg, Oral, Q12H  enoxaparin, 30 mg, Subcutaneous, Daily  furosemide, 40 mg, Intravenous, Q12H  methylPREDNISolone sodium succinate, 40 mg, Intravenous, Q8H  multivitamin with minerals, 1 tablet, Oral, Daily  Pancrelipase (Lip-Prot-Amyl), 36,000 units of lipase, Oral, TID With Meals  pharmacy consult - MTM, , Does not apply, Daily  senna-docusate sodium, 2 tablet, Oral, BID  sodium chloride, 10 mL, Intravenous, Q12H      Continuous Infusions:   PRN Meds:.  acetaminophen **OR** acetaminophen **OR** acetaminophen    senna-docusate sodium **AND** polyethylene glycol **AND** bisacodyl **AND** bisacodyl    calcium carbonate    Calcium Replacement - Follow Nurse / BPA Driven Protocol    ipratropium-albuterol    Magnesium Standard Dose Replacement - Follow Nurse / BPA Driven Protocol    oxyCODONE-acetaminophen    Phosphorus Replacement - Follow Nurse / BPA Driven Protocol    Potassium Replacement - Follow Nurse / BPA Driven Protocol    [COMPLETED] Insert Peripheral IV **AND** sodium chloride    sodium chloride    sodium chloride    Assessment & Plan   Assessment & Plan     Active Hospital Problems    Diagnosis  POA    **COPD with acute exacerbation [J44.1]  Yes    COPD (chronic obstructive pulmonary disease) [J44.9]  Yes    Hypokalemia [E87.6]  Yes    Elevated troponin [R77.8]  Yes    Syncope [R55]  Yes    Severe malnutrition [E43]  Yes    COPD exacerbation [J44.1]  Yes    Acute on chronic respiratory failure with hypoxia and hypercapnia [J96.21, J96.22]  Yes    Chronic Pancreatitis [K85.90]  Yes      Resolved Hospital Problems   No resolved problems to display.        Brief Hospital Course to date:  Mary West is a 63 y.o.  female with PMH significant for COPD with chronic respiratory failure on 4 LNC, chronic pancreatitis, malnutrition, who presents to the ED with complaint of shortness of breath and syncope who was found to have concern for COPD with acute exacerbation.    This patient's problems and plans were partially entered by my partner and updated as appropriate by me 09/18/23.      Acute on chronic respiratory failure with hypercapnia and hypoxia  COPD exacerbation  Pulmonary HTN   HFpEF   - Baseline supplemental oxygen 4L NC (per her report, will have CM clarify), wean as tolerated   - Scheduled and as needed DuoNebs  - Continue Symbicort/Spiriva   - Continue Solu-Medrol 40 mg 3 times daily for now with plan to taper as appropriate  - COPD navigator  - Pulmonology following, appreciate assistance   - Continue Doxycycline  - Will need a nebulizer machine for home  - Lasix 40mg IV q12 x 2 doses   -Echo with EF 54.5%, grade 1 diastolic dysfunction, mod TVR, RVSP>55, severe pulmonary HTN     Syncope  - Orthostatic BP negative   - Likely related to hypoxia  - Echo as above  - Fall precautions  - CT head negative     Elevated troponin  Elevated proBNP  - Troponin trending down  - Denies chest pain  - Echo as above  -- IV Lasix x2 doses     Hypokalemia  - Electrolyte replacement per protocol      Chronic pancreatitis  - Continue Creon     Severe malnutrition  - RD following      Tobacco abuse  - Reports longer smoking, but now using vapes  - Encouraged cessation    Expected Discharge Location and Transportation: Home  Expected Discharge   Expected Discharge Date: 9/20/2023; Expected Discharge Time:      DVT prophylaxis:  Medical DVT prophylaxis orders are present.     AM-PAC 6 Clicks Score (PT): 22 (09/18/23 0805)    CODE STATUS:   Code Status and Medical Interventions:   Ordered at: 09/17/23 0159     Level Of Support Discussed With:    Patient     Code Status (Patient has no pulse and is not breathing):    CPR (Attempt to  Resuscitate)     Medical Interventions (Patient has pulse or is breathing):    Full Support       Monica Gupta DO  09/18/23        Electronically signed by Monica Gupta DO at 09/18/23 1202       Monica Gupta DO at 09/17/23 1133                [facility] Medicine Services  ADMISSION FOLLOW-UP NOTE          Patient admitted after midnight, H&P by my partner performed earlier on today's date reviewed.  Interim findings, labs, and charting also reviewed.        The [facility] Problem List has been managed and updated to include any new diagnoses:  Active Hospital Problems    Diagnosis  POA    **COPD with acute exacerbation [J44.1]  Yes    COPD (chronic obstructive pulmonary disease) [J44.9]  Yes    Hypokalemia [E87.6]  Yes    Elevated troponin [R77.8]  Yes    Syncope [R55]  Yes    Severe malnutrition [E43]  Yes    COPD exacerbation [J44.1]  Yes    Acute on chronic respiratory failure with hypoxia and hypercapnia [J96.21, J96.22]  Yes    Chronic Pancreatitis [K85.90]  Yes      Resolved Hospital Problems   No resolved problems to display.         ADDITIONAL PLAN:  - detailed assessment and plan from admission reviewed    63 y.o. female with PMH significant for COPD with chronic respiratory failure on 4 LNC, chronic pancreatitis, malnutrition, who presents to the ED with complaint of shortness of breath and syncope who was found to have concern for COPD with acute exacerbation.     Acute on chronic respiratory failure with hypercapnia and hypoxia  COPD exacerbation  - Baseline supplemental oxygen 4L NC(per her report, will have CM clarify), currently on 6 LNC, wean as tolerated   - Scheduled and as needed DuoNebs  - Continue Symbicort/Spiriva   - Continue Solu-Medrol 40 mg 3 times daily for now with plan to taper as appropriate  - COPD navigator  - Consider pulmonary consult tomorrow pending progress. Does not currently follow with Pulm outpatient   - Continue Doxycycline  -  Will need a nebulizer machine for home     Syncope  - Orthostatic BP negative   - Likely related to hypoxia  - Echo pending  - Fall precautions  - CT head pending     Elevated troponin  Elevated proBNP  - Troponin trending down  - Denies chest pain  - Echo pending  - Does not appear volume overloaded, stable pleural effusions noted on CTA chest     Hypokalemia  - Electrolyte replacement     Chronic pancreatitis  - Continue Creon     Severe malnutrition  - Nutritional consult      Tobacco abuse  - Reports longer smoking, but now using vapes  - Encourage cessation    Expected Discharge   Expected Discharge Date: 9/20/2023; Expected Discharge Time:      Monica Gupta DO  09/17/23       Electronically signed by Monica Gupta DO at 09/17/23 1137          Consult Notes (last 72 hours)        Rebekah SimpsonDO Gloria at 09/18/23 0929        Consult Orders    1. Inpatient Pulmonology Consult [[id number]] ordered by Monica Gupta DO at 09/18/23 0700                   Pulmonary New Patient Evaluation     REFERRING PHYSICIAN:  Monica Gupta    Subjective       Chief Complaint   Patient presents with    Shortness of Breath            SUBJECTIVE     Ms. West is a 62yo F with a history of COPD on 2L home O2 and ongoing tobacco use who presented to the [facility] ED on 9/17/23 with worsening shortness of breath.     CTA of the chest in the ED was negative for PE. She was noted to have severe emphysema and small bilateral pleural effusions.     BNP was elevated at 7481.     She was admitted to Hospital Medicine and started on Doxycycline and Solumedrol for COPD exacerbation.     She does not follow with Pulmonary Medicine. We do not have any PFTs available to assess the severity of her COPD.     Since admission, she has continued to require 6L nasal cannula.       PMH: She  has a past medical history of Alcohol abuse, COPD (chronic obstructive pulmonary disease), and Pancreatitis.   PSxH: She  has no past surgical history  on file.      Medications:     Current Facility-Administered Medications:     !patient's home meds stored in pharmacy, please  prior to discharge, , Does not apply, Q12H, Tian Lui, PharmD    acetaminophen (TYLENOL) tablet 650 mg, 650 mg, Oral, Q4H PRN, 650 mg at 09/17/23 1701 **OR** acetaminophen (TYLENOL) 160 MG/5ML oral solution 650 mg, 650 mg, Oral, Q4H PRN **OR** acetaminophen (TYLENOL) suppository 650 mg, 650 mg, Rectal, Q4H PRN, Shaunna Lewis APRN    albuterol (PROVENTIL) nebulizer solution 0.083% 2.5 mg/3mL, 2.5 mg, Nebulization, 4x Daily - RT, Monica Gupta, , 2.5 mg at 09/18/23 0724    sennosides-docusate (PERICOLACE) 8.6-50 MG per tablet 2 tablet, 2 tablet, Oral, BID, 2 tablet at 09/17/23 2002 **AND** polyethylene glycol (MIRALAX) packet 17 g, 17 g, Oral, Daily PRN **AND** bisacodyl (DULCOLAX) EC tablet 5 mg, 5 mg, Oral, Daily PRN **AND** bisacodyl (DULCOLAX) suppository 10 mg, 10 mg, Rectal, Daily PRN, Shaunna Lewis APRN    budesonide-formoterol (SYMBICORT) 160-4.5 MCG/ACT inhaler 2 puff, 2 puff, Inhalation, BID - RT, 2 puff at 09/18/23 0726 **AND** tiotropium (SPIRIVA RESPIMAT) 2.5 mcg/act aerosol solution inhaler, 2 puff, Inhalation, Daily - RT, Shaunna Lewis APRN, 2 puff at 09/18/23 0726    calcium carbonate (TUMS) chewable tablet 500 mg (200 mg elemental), 2 tablet, Oral, BID PRN, Shaunna Lewis APRN    Calcium Replacement - Follow Nurse / BPA Driven Protocol, , Does not apply, PRN, Shaunna Lewis APRN    doxycycline (MONODOX) capsule 100 mg, 100 mg, Oral, Q12H, Shaunna Lewis APRN, 100 mg at 09/17/23 2002    Enoxaparin Sodium (LOVENOX) syringe 30 mg, 30 mg, Subcutaneous, Daily, Shaunna Lewis APRN, 30 mg at 09/17/23 0841    furosemide (LASIX) injection 40 mg, 40 mg, Intravenous, Q12H, Case, Rebekah V., DO    ipratropium-albuterol (DUO-NEB) nebulizer solution 3 mL, 3 mL, Nebulization, Q4H PRN, Shaunna Lewis APRN    Magnesium Standard Dose Replacement -  Follow Nurse / BPA Driven Protocol, , Does not apply, PRN, Shaunna Lewis, APRGIO    methylPREDNISolone sodium succinate (SOLU-Medrol) injection 40 mg, 40 mg, Intravenous, Q8H, Shaunna Lewis APRN, 40 mg at 09/17/23 2329    multivitamin with minerals 1 tablet, 1 tablet, Oral, Daily, Shaunna Lewis APRN, 1 tablet at 09/17/23 0841    oxyCODONE-acetaminophen (PERCOCET)  MG per tablet 1 tablet, 1 tablet, Oral, Q4H PRN, Monica Gupta DO, 1 tablet at 09/17/23 1714    Pancrelipase (Lip-Prot-Amyl) (CREON) capsule 36,000 units of lipase, 36,000 units of lipase, Oral, TID With Meals, Shaunna Lewis APRN, 36,000 units of lipase at 09/18/23 0901    Pharmacy Consult - [place], , Does not apply, Daily, Lashanda Houston, [facility]    Phosphorus Replacement - Follow Nurse / BPA Driven Protocol, , Does not apply, PRN, Shaunna Lewis APRN    Potassium Replacement - Follow Nurse / BPA Driven Protocol, , Does not apply, PRN, Shaunna Lewis APRN    [COMPLETED] Insert Peripheral IV, , , Once **AND** sodium chloride 0.9 % flush 10 mL, 10 mL, Intravenous, PRN, Myesha Dick PA-C    sodium chloride 0.9 % flush 10 mL, 10 mL, Intravenous, Q12H, Shaunna Lewis APRN, 10 mL at 09/17/23 2003    sodium chloride 0.9 % flush 10 mL, 10 mL, Intravenous, PRN, Shaunna Lewis APRN    sodium chloride 0.9 % infusion 40 mL, 40 mL, Intravenous, PRN, Shaunna Lewis APRN    Allergies: She is allergic to green pepper, mushroom, and methadone.   FH: Her family history includes Heart failure in her mother; Stroke in her father and mother.   SH: She  reports that she quit smoking about 6 weeks ago. Her smoking use included cigarettes. She started smoking about 50 years ago. She has a 20.00 pack-year smoking history. She has never used smokeless tobacco. She reports that she does not drink alcohol and does not use drugs.     The patient's relevant past medical, surgical and social history were reviewed and updated in Epic as  appropriate.    ROS (14):   Review of Systems   Respiratory:  Positive for shortness of breath.          Objective   OBJECTIVE     Physical Examination   Vitals:    09/18/23 0600 09/18/23 0724 09/18/23 0725 09/18/23 0726   BP:   151/82    BP Location:   Right arm    Patient Position:   Lying    Pulse:  72 68 65   Resp:  16 16    Temp:   97.7 °F (36.5 °C)    TempSrc:   Oral    SpO2:  98% 97% 97%   Weight: 38.1 kg (84 lb 1.6 oz)      Height:           Body mass index is 13.17 kg/m².    Physical Examination    Constitutional:  No acute distress.  Sleeping in bed on 6L nasal cannula.    Neck:  Normal range of motion. Neck supple.   No JVD present. No tracheal deviation present.  No thyromegaly present.    Cardiovascular: Normal rate, regular and rhythm. Normal heart sounds.  No murmurs, gallop or rub.  No peripheral edema.   Respiratory: No respiratory distress. Normal respiratory effort.  Diminished bilaterally. No wheezing.    Abdominal:  Soft. No masses. Nontender. No distension. No HSM.   Extremities: No digital cyanosis. No clubbing.   Lymphadenopathy: None.   Skin: Skin is warm and dry. No rashes, lesions or ulcers noted.    Neurological:   Arouses to stimulation.    Psychiatric:  Normal affect. Normal behavior.     Diagnostic Data    Imaging Results (Last 24 Hours)       Procedure Component Value Units Date/Time    CT Head Without Contrast [[id number]] Collected: 09/17/23 1412     Updated: 09/17/23 1417    Narrative:      CT HEAD WO CONTRAST    Date of Exam: 9/17/2023 1:47 PM EDT    Indication: Syncope/presyncope, cerebrovascular cause suspected.    Comparison: None available.    Technique: Axial CT images were obtained of the head without contrast administration.  Automated exposure control and iterative construction methods were used.    FINDINGS: Gray-white differentiation is maintained and there is no evidence of intracranial hemorrhage, mass or mass effect. Age-related changes of the brain are present  including volume loss and typical periventricular sequela of chronic small vessel   ischemia. There is otherwise no evidence of intracranial hemorrhage, mass or mass effect. The ventricles are normal in size and configuration accounting for surrounding volume loss. The orbits are normal and the paranasal sinuses are grossly clear. There   is a prominent chronic appearing left mastoid effusion in addition to soft tissue density present within both external auditory canals, possible cerumen.      Impression:      Age-related changes of the brain as above, otherwise without evidence of acute intracranial abnormality.      Electronically Signed: Broderick Reynoso MD    9/17/2023 2:14 PM EDT    Workstation ID: [id number]             Results for orders placed during the hospital encounter of 09/16/23    Adult Transthoracic Echo Complete With Contrast if Necessary Per Protocol    Interpretation Summary    Left ventricular systolic function is normal. Calculated left ventricular EF = 54.4%    Left ventricular diastolic function is consistent with (grade I) impaired relaxation.    The right ventricular cavity is mildly dilated.    The right atrial cavity is mildly  dilated.    Moderate tricuspid valve regurgitation is present.    Estimated right ventricular systolic pressure from tricuspid regurgitation is markedly elevated (>55 mmHg). Calculated right ventricular systolic pressure from tricuspid regurgitation is 76 mmHg.    Severe pulmonary hypertension is present.         Assessment & Plan   ASSESSMENT / PLAN     Assessment:  Ms. West is a 62yo F with a history of COPD on 2L home O2 and ongoing tobacco use who presented to the [facility] ED on 9/17/23 with worsening shortness of breath.     CTA of the chest in the ED was negative for PE. She was noted to have severe emphysema and small bilateral pleural effusions.     BNP was elevated at 7481.     She was admitted to Hospital Medicine and started on Doxycycline and Solumedrol for  COPD exacerbation.     She does not follow with Pulmonary Medicine. We do not have any PFTs available to assess the severity of her COPD.     Since admission, she has continued to require 6L nasal cannula.     Echocardiogram is significant for HFpEF as well a RV dilation and RVSP of 76mmHg suggestive of Cor Pulmonale secondary to severe lung disease and diastolic dysfunction.     Active Hospital Problems    Diagnosis     **COPD with acute exacerbation     COPD (chronic obstructive pulmonary disease)     Hypokalemia     Elevated troponin     Syncope     Severe malnutrition     COPD exacerbation     Acute on chronic respiratory failure with hypoxia and hypercapnia     Chronic Pancreatitis         Plan:  Continue Doxycycline and IV Solumedrol.   Trial of Lasix 40mg IV x 2 to see if this improves oxygenation.   Wean supplemental oxygen as tolerated. Goal O2 saturation is 88-92%. I have weaned her O2 to 3L at bedside this AM.   Continue Symbicort and Spiriva for now. She may benefit from changing to a nebulized regimen if she continues to have no significant improvement.   She will need to follow up in Pulmonary Clinic to obtain Full PFTs. We will not perform these as an inpatient while she is acutely ill.       Rebekah Simpson DO  Pulmonary and Critical Care Medicine        Electronically signed by Rebekah Simpson DO at 09/18/23 3730

## 2023-09-20 NOTE — PROGRESS NOTES
INPATIENT PULMONARY SERVICE  PROGRESS NOTE     Hospital LOS: 3 days    Ms. Mary West, is followed for a Chief Complaint of:  Chief Complaint   Patient presents with    Shortness of Breath       COPD with acute exacerbation    Acute on chronic respiratory failure with hypoxia and hypercapnia    Chronic Pancreatitis    COPD exacerbation    Severe malnutrition    COPD (chronic obstructive pulmonary disease)    Hypokalemia    Elevated troponin    Syncope      Subjective   S     Interval History:  No acute events. On home 4L. Wants to go home if possible.        The patient's relevant past medical, surgical and social history were reviewed and updated in Epic as appropriate.      ROS:   Constitutional: Negative for fever.   Respiratory: Positive for dyspnea.   Cardiovascular: Negative for chest pain.   Gastrointestinal: Negative for  nausea, vomiting and diarrhea.     Objective   O     Vitals  Temp  Min: 97.7 °F (36.5 °C)  Max: 98.5 °F (36.9 °C)  BP  Min: 106/83  Max: 136/83  Pulse  Min: 64  Max: 88  Resp  Min: 16  Max: 18  SpO2  Min: 80 %  Max: 99 % Flow (L/min)  Min: 3  Max: 4    I/O 24 HR (7:00 AM-6:59AM):  Intake/Output       None            Medications (Drips):       Physical Examination    Telemetry: Normal sinus rhythm.    Constitutional:  No acute distress.  Conversant. Sitting up in bed on nasal cannula.    Cardiovascular: Regular rate and rhythm.  No murmurs, rub or gallop.   Respiratory: Normal symmetric chest expansion.  Normal respiratory effort.  Diminished bilaterally. No wheezing.    Abdominal:  Soft. No masses.   Non-tender. No distension.   No hepatosplenomegaly.   Extremities: No digital cyanosis or clubbing.  No peripheral edema.   Neurological:   Alert and Oriented to person, place, and time.   Moves all extremities.           Results from last 7 days   Lab Units 09/20/23  0456 09/18/23  0326 09/17/23  0801   WBC 10*3/mm3 10.08 9.97 4.86   HEMOGLOBIN g/dL 15.0 13.7 14.2   MCV fL 101.6* 103.4*  103.0*   PLATELETS 10*3/mm3 248 215 225     Results from last 7 days   Lab Units 09/20/23  0456 09/18/23  0326 09/17/23  0801   SODIUM mmol/L 138 142 144   POTASSIUM mmol/L 4.5 5.2 4.3   CO2 mmol/L 31.0* 29.0 28.0   CREATININE mg/dL 0.49* 0.46* 0.68   MAGNESIUM mg/dL  --   --  1.7   PHOSPHORUS mg/dL  --   --  3.4     Serum creatinine: 0.49 mg/dL (L) 09/20/23 0456  Estimated creatinine clearance: 68.1 mL/min (A)  Results from last 7 days   Lab Units 09/16/23 2010   ALK PHOS U/L 82   BILIRUBIN mg/dL 0.6   ALT (SGPT) U/L 10   AST (SGOT) U/L 22       Results from last 7 days   Lab Units 09/16/23  2039   PH, ARTERIAL pH units 7.435   PCO2, ARTERIAL mm Hg 45.5*   PO2 ART mm Hg 71.4*   FIO2 % 44       Images:     Imaging Results (Last 24 Hours)       ** No results found for the last 24 hours. **            I reviewed the patient's new clinical results.  I reviewed the patient's new imaging results and agree with the interpretation.    Assessment & Plan   A / P     Ms. West is a 62yo F with a history of COPD on 2L home O2 and ongoing tobacco use who presented to the [facility] ED on 9/17/23 with worsening shortness of breath.      CTA of the chest in the ED was negative for PE. She was noted to have severe emphysema and small bilateral pleural effusions.      BNP was elevated at 7481.      She was admitted to Hospital Medicine and started on Doxycycline and Solumedrol for COPD exacerbation.      She does not follow with Pulmonary Medicine. We do not have any PFTs available to assess the severity of her COPD.      Echocardiogram is significant for HFpEF as well a RV dilation and RVSP of 76mmHg suggestive of Cor Pulmonale secondary to severe lung disease and diastolic dysfunction.     Diet: Regular/House Diet; Texture: Regular Texture (IDDSI 7); Fluid Consistency: Thin (IDDSI 0)  Code Status and Medical Interventions:   Ordered at: 09/17/23 0159     Level Of Support Discussed With:    Patient     Code Status (Patient has no pulse  and is not breathing):    CPR (Attempt to Resuscitate)     Medical Interventions (Patient has pulse or is breathing):    Full Support       Active Hospital Problems    Diagnosis     **COPD with acute exacerbation     COPD (chronic obstructive pulmonary disease)     Hypokalemia     Elevated troponin     Syncope     Severe malnutrition     COPD exacerbation     Acute on chronic respiratory failure with hypoxia and hypercapnia     Chronic Pancreatitis        Assessment / Plan:  Prednisone with taper.   Lasix decreased to 20mg daily. Recommend to continue at discharge.   Now on home O2 at 4L.   Complete a course of Doxycycline.   Resume home Trelegy 100 at discharge.   Case management to get home nebulizer for PRN nebs.   She will need to follow up in Pulmonary Clinic to obtain Full PFTs. We will not perform these as an inpatient while she is acutely ill.   Okay to discharge from a Pulmonary Standpoint.     I discussed the patient's findings and my recommendations with patient and primary care team      Rebekah Simpson, DO  Pulmonary and Critical Care Medicine

## 2023-09-20 NOTE — CASE MANAGEMENT/SOCIAL WORK
Case Management Discharge Note      Final Note: Pt is being discharged today and plan is home. Pt is needing a nebulizer and a referral was made to [facility] as pt has her home O2 through [facility]. I spoke with Elsy Slaughter confirmed that pt is currently on 4 L of O2, 24/7. When [facility] went to deliver nebulizer to pt's room, pt had already been discharged. Per Sukhjinder, [facility] will deliver nebulizer to pts home. I attempted to contact pt and had to leave a voice message. No further discharge needs identified. .         Selected Continued Care - Discharged on 9/20/2023 Admission date: 9/16/2023 - Discharge disposition: Home or Self Care      Destination    No services have been selected for the patient.                Durable Medical Equipment       Service Provider Selected Services Address Phone Fax Patient Preferred    [facility] - "[employer] Durable Medical Equipment [address], [facility] [address] [phone] --              Dialysis/Infusion    No services have been selected for the patient.                Home Medical Care    No services have been selected for the patient.                Therapy    No services have been selected for the patient.                Community Resources    No services have been selected for the patient.                Community & DME    No services have been selected for the patient.                    Selected Continued Care - Prior Encounters Includes continued care and service providers with selected services from prior encounters from 6/18/2023 to 9/20/2023      Discharged on 7/23/2023 Admission date: 7/17/2023 - Discharge disposition: Home or Self Care      Durable Medical Equipment       Service Provider Selected Services Address Phone Fax Patient Preferred    [facility] - "[employer] [address] [id number] [phone] --                               Final Discharge Disposition Code: 01 - home or  self-care

## 2023-09-20 NOTE — THERAPY DISCHARGE NOTE
Patient Name: Mary West  : 1960    MRN: [id number]                              Today's Date: 2023       Admit Date: 2023    Visit Dx:     ICD-10-CM ICD-9-CM   1. Acute on chronic respiratory failure with hypoxia  J96.21 518.84     799.02   2. Acute exacerbation of chronic obstructive pulmonary disease (COPD)  J44.1 491.21   3. Bilateral pleural effusion  J90 511.9   4. Hypoxia  R09.02 799.02   5. Tobacco dependence  F17.200 305.1   6. Malnutrition, unspecified type  E46 263.9     Patient Active Problem List   Diagnosis    COVID-19    COPD with acute exacerbation    Acute on chronic respiratory failure with hypoxia and hypercapnia    Acute respiratory failure due to COVID-19    Remote history alcohol abuse    Tobacco dependence    CAP (community acquired pneumonia)    Chronic Pancreatitis    Chronic narcotic use    Cytokine release syndrome, grade 2    Hypoglycemia    COPD exacerbation    Acute exacerbation of chronic obstructive pulmonary disease (COPD)    Severe malnutrition    COPD (chronic obstructive pulmonary disease)    Hypokalemia    Elevated troponin    Syncope     Past Medical History:   Diagnosis Date    Alcohol abuse     Quit early     COPD (chronic obstructive pulmonary disease)     Pancreatitis      History reviewed. No pertinent surgical history.   General Information       Row Name 23 1109          Physical Therapy Time and Intention    Document Type discharge treatment  -ML     Mode of Treatment physical therapy  -ML       Row Name 23 1109          General Information    Patient Profile Reviewed yes  -ML     Existing Precautions/Restrictions oxygen therapy device and L/min  oxygen desaturation with activity  -ML     Barriers to Rehab medically complex  -ML       Row Name 23 1109          Cognition    Orientation Status (Cognition) oriented x 4  -ML       Row Name 23 1109          Safety Issues, Functional Mobility    Impairments Affecting Function  (Mobility) endurance/activity tolerance;shortness of breath  -ML               User Key  (r) = Recorded By, (t) = Taken By, (c) = Cosigned By      Initials Name Provider Type    ML Tika Sandoval Physical Therapist                   Mobility       Row Name 09/20/23 1110          Bed Mobility    Bed Mobility supine-sit;sit-supine  -ML     Supine-Sit [place] (Bed Mobility) modified independence  -ML     Sit-Supine [place] (Bed Mobility) modified independence  -ML       Row Name 09/20/23 1110          Sit-Stand Transfer    Sit-Stand [place] (Transfers) independent  -ML       Row Name 09/20/23 1110          Gait/Stairs (Locomotion)    [place] Level (Gait) independent  -ML     Distance in Feet (Gait) 80 + 260  -ML     Deviations/Abnormal Patterns (Gait) bilateral deviations;base of support, narrow;yuli decreased;gait speed decreased  -ML     Comment, (Gait/Stairs) Patient remained on 4 LPM during ambulation with oxgyen desaturation to 86% at the lowest, with seated recovery patient with improvement to 91%. Patient reports GRISSOM 2-3/10 on the MARY scale.  -ML               User Key  (r) = Recorded By, (t) = Taken By, (c) = Cosigned By      Initials Name Provider Type    Tika Greenwood Physical Therapist                   Obj/Interventions       Row Name 09/20/23 1112          Balance    Balance Assessment sitting static balance;sitting dynamic balance;sit to stand dynamic balance;standing static balance;standing dynamic balance  -ML     Static Sitting Balance independent  -ML     Dynamic Sitting Balance independent  -ML     Position, Sitting Balance unsupported;sitting edge of bed  -ML     Sit to Stand Dynamic Balance independent  -ML     Static Standing Balance independent  -ML     Dynamic Standing Balance independent  -ML     Position/Device Used, Standing Balance unsupported  -ML     Balance Interventions sitting;standing;sit to stand;occupation based/functional task  -ML               User Key  (r)  = Recorded By, (t) = Taken By, (c) = Cosigned By      Initials Name Provider Type    Tika Greenwood Physical Therapist                   Goals/Plan       Row Name 09/20/23 1118          Bed Mobility Goal 1 (PT)    Progress/Outcomes (Bed Mobility Goal 1, PT) goal partially met  -ML       Row Name 09/20/23 1118          Transfer Goal 1 (PT)    Progress/Outcome (Transfer Goal 1, PT) goal met  -ML       Row Name 09/20/23 1118          Gait Training Goal 1 (PT)    Progress/Outcome (Gait Training Goal 1, PT) goal met  -ML       Row Name 09/20/23 1118          Stairs Goal 1 (PT)    Strategies/Barriers (Stairs Goal 1, PT) patient educated on completing step to pattern and to wear oxygen while ascending/descending steps, patient verbalized understanding  -ML     Progress/Outcome (Stairs Goal 1, PT) goal not met;other (see comments)  patient declined stair training  -ML               User Key  (r) = Recorded By, (t) = Taken By, (c) = Cosigned By      Initials Name Provider Type    Tika Greenwood Physical Therapist                   Clinical Impression       Row Name 09/20/23 1113          Pain    Pretreatment Pain Rating 7/10  -ML     Posttreatment Pain Rating 7/10  -ML     Pain Location - Side/Orientation Right  -ML     Pain Location generalized  -ML     Pain Location - abdomen  -ML       Row Name 09/20/23 1113          Plan of Care Review    Plan of Care Reviewed With patient  -ML     Progress improving  -ML     Outcome Evaluation Physical therapy treatment complete. The patient with significant improvement in activity tolerance since last treatment. Patient ambulated on 4 LPM with oxygen desaturation to 86%, with seated recovery patient patient recovered to 91%. The patient verbalized understanding of energy conservation strategies and breathing techniques. The patient has met PT goals, no further PT needs identified at this time. At hospital discharge recommend patient return home with assist. Patient would benefit from  outpatient pulmonary rehab.  -ML       Row Name 09/20/23 1113          Vital Signs    Pre SpO2 (%) 93  -ML     O2 Delivery Pre Treatment nasal cannula  -ML     Intra SpO2 (%) 86  -ML     O2 Delivery Intra Treatment nasal cannula  -ML     Post SpO2 (%) 91  -ML     O2 Delivery Post Treatment nasal cannula  -ML     Pre Patient Position Supine  -ML     Intra Patient Position Standing  -ML     Post Patient Position Supine  -ML       Row Name 09/20/23 1113          Positioning and Restraints    Pre-Treatment Position in bed  -ML     Post Treatment Position bed  -ML     In Bed notified nsg;fowlers;call light within reach;encouraged to call for assist;side rails up x2  -ML               User Key  (r) = Recorded By, (t) = Taken By, (c) = Cosigned By      Initials Name Provider Type    Tika Greenwood Physical Therapist                   Outcome Measures       Row Name 09/20/23 1118          How much help from another person do you currently need...    Turning from your back to your side while in flat bed without using bedrails? 4  -ML     Moving from lying on back to sitting on the side of a flat bed without bedrails? 4  -ML     Moving to and from a bed to a chair (including a wheelchair)? 4  -ML     Standing up from a chair using your arms (e.g., wheelchair, bedside chair)? 4  -ML     Climbing 3-5 steps with a railing? 4  -ML     To walk in hospital room? 4  -ML     AM-PAC 6 Clicks Score (PT) 24  -ML     Highest level of mobility 8 --> Walked 250 feet or more  -ML       Row Name 09/20/23 1118          Functional Assessment    Outcome Measure Options AM-PAC 6 Clicks Basic Mobility (PT)  -ML               User Key  (r) = Recorded By, (t) = Taken By, (c) = Cosigned By      Initials Name Provider Type    Tika Greenwood Physical Therapist                  Physical Therapy Education       Title: PT OT SLP Therapies (In Progress)       Topic: Physical Therapy (In Progress)       Point: Mobility training (Done)       Learning  Progress Summary             Patient Acceptance, E, VU by ML at 9/20/2023 1119    Acceptance, E, VU,NR by ML at 9/17/2023 1510    Comment: home oxygen management, pursed lip breathing, energy conservation techniques                         Point: Home exercise program (Not Started)       Learner Progress:  Not documented in this visit.              Point: Body mechanics (Not Started)       Learner Progress:  Not documented in this visit.              Point: Precautions (Done)       Learning Progress Summary             Patient Acceptance, E, VU by ML at 9/20/2023 1119    Acceptance, E, VU,NR by ML at 9/17/2023 1510    Comment: home oxygen management, pursed lip breathing, energy conservation techniques                                         User Key       Initials Effective Dates Name Provider Type Discipline     04/22/21 -  Tika Sandoval Physical Therapist PT                  PT Recommendation and Plan  Planned Therapy Interventions (PT): balance training, bed mobility training, gait training, home exercise program, neuromuscular re-education, patient/family education, postural re-education, ROM (range of motion), stair training, strengthening, stretching, transfer training  Plan of Care Reviewed With: patient  Progress: improving  Outcome Evaluation: Physical therapy treatment complete. The patient with significant improvement in activity tolerance since last treatment. Patient ambulated on 4 LPM with oxygen desaturation to 86%, with seated recovery patient patient recovered to 91%. The patient verbalized understanding of energy conservation strategies and breathing techniques. The patient has met PT goals, no further PT needs identified at this time. At hospital discharge recommend patient return home with assist. Patient would benefit from outpatient pulmonary rehab.     Time Calculation:   PT Evaluation Complexity  History, PT Evaluation Complexity: 1-2 personal factors and/or comorbidities  Examination of  Body Systems (PT Eval Complexity): total of 3 or more elements  Clinical Presentation (PT Evaluation Complexity): evolving  Clinical Decision Making (PT Evaluation Complexity): moderate complexity  Overall Complexity (PT Evaluation Complexity): moderate complexity     PT Charges       Row Name 09/20/23 1147             Time Calculation    Start Time 1034  -ML      PT Received On 09/20/23  -ML         Timed Charges    92927 - PT Therapeutic Activity Minutes 26  -ML         Total Minutes    Timed Charges Total Minutes 26  -ML       Total Minutes 26  -ML                User Key  (r) = Recorded By, (t) = Taken By, (c) = Cosigned By      Initials Name Provider Type    Tika Greenwood Physical Therapist                  Therapy Charges for Today       Code Description Service Date Service Provider Modifiers Qty    34173885719 HC PT THERAPEUTIC ACT EA 15 MIN 9/20/2023 Tika Sandoval GP 2            PT G-Codes  Outcome Measure Options: AM-PAC 6 Clicks Basic Mobility (PT)  AM-PAC 6 Clicks Score (PT): 24  AM-PAC 6 Clicks Score (OT): 24    PT Discharge Summary  Anticipated Discharge Disposition (PT): home with assist, other (see comments) (outpatient pulmonary rehab)    Tika Sandoval  9/20/2023

## 2023-09-20 NOTE — PLAN OF CARE
Problem: Adult Inpatient Plan of Care  Goal: Plan of Care Review  Recent Flowsheet Documentation  Taken 9/20/2023 1145 by Lakshmi Meehan, OT  Progress: improving  Plan of Care Reviewed With: patient  Outcome Evaluation: Pt. with increasing occupational endurance & pt. education for adaptive breathing and energy conservation techniques, to increase ADL & functional mobility independence. Also, talked thru changes to work routine to improve occupational endurance. Pt. issued EC handout & walked thru routine with therapist in room. Recommend home with assist & outpatient pulmonary rehab upon d/c from hospital.   Goal Outcome Evaluation:  Plan of Care Reviewed With: patient        Progress: improving  Outcome Evaluation: Pt. with increasing occupational endurance & pt. education for adaptive breathing and energy conservation techniques, to increase ADL & functional mobility independence. Also, talked thru changes to work routine to improve occupational endurance. Pt. issued EC handout & walked thru routine with therapist in room. Recommend home with assist & outpatient pulmonary rehab upon d/c from hospital.      Anticipated Discharge Disposition (OT): home with assist, home with outpatient therapy services

## 2023-09-21 ENCOUNTER — TRANSITIONAL CARE MANAGEMENT TELEPHONE ENCOUNTER (OUTPATIENT)
Dept: CALL CENTER | Facility: HOSPITAL | Age: 63
End: 2023-09-21
Payer: MEDICARE

## 2023-09-21 NOTE — OUTREACH NOTE
Call Center TCM Note      Flowsheet Row Responses   [facility] patient discharged from? [place]   Does the patient have one of the following disease processes/diagnoses(primary or secondary)? COPD   TCM attempt successful? Yes   Call start time 1341   Call end time 1344   Discharge diagnosis COPD   Meds reviewed with patient/caregiver? Yes   Is the patient having any side effects they believe may be caused by any medication additions or changes? No   Does the patient have all medications ordered at discharge? No   What is keeping the patient from filling the prescriptions? --  [waiting on pharmacy to fill pt's Creon, she has some still at home while she waits]   Is the patient taking all medications as directed (includes completed medication regime)? Yes   Comments Hospital d/c f/u appt on 9/27/23 @1pm   Does the patient have an appointment with their PCP within 7-14 days of discharge? Yes   Has home health visited the patient within 72 hours of discharge? N/A   Has all DME been delivered? No  [should be delivered today]   DME comments home oxygen, wears 4LO2   Pulse Ox monitoring None   Psychosocial issues? No   Did the patient receive a copy of their discharge instructions? Yes   Nursing interventions Reviewed instructions with patient   What is the patient's perception of their health status since discharge? Improving   Nursing Interventions Nurse provided patient education   Is the patient/caregiver able to teach back the hierarchy of who to call/visit for symptoms/problems? PCP, Specialist, Home health nurse, Urgent Care, ED, 911 Yes   Patient reports what zone on this call? Green Zone   Green Zone Reports doing well   Green Zone interventions: Take daily medications, Use oxygen as prescribed, At all times avoid cigarette smoking, vaping and inhaled irritants   TCM call completed? Yes   Call end time 1344   Would this patient benefit from a Referral to [facility] Social Work? No   Is the patient interested in  additional calls from an ambulatory ? No            Roseann Snyder RN    9/21/2023, 13:46 EDT

## 2023-09-21 NOTE — OUTREACH NOTE
Prep Survey      Flowsheet Row Responses   [facility] facility patient discharged from? [facility]   Is LACE score < 7 ? No   Eligibility [facility]   Date of Admission 09/16/23   Date of Discharge 09/20/23   Discharge Disposition Home or Self Care   Discharge diagnosis COPD   Does the patient have one of the following disease processes/diagnoses(primary or secondary)? COPD   Does the patient have Home health ordered? No   Is there a DME ordered? Yes   What DME was ordered? [facility] for oxygen   Prep survey completed? Yes            BULL REDD - Registered Nurse

## 2024-11-05 ENCOUNTER — APPOINTMENT (OUTPATIENT)
Dept: GENERAL RADIOLOGY | Facility: HOSPITAL | Age: 64
End: 2024-11-05
Payer: MEDICARE

## 2024-11-05 ENCOUNTER — APPOINTMENT (OUTPATIENT)
Dept: CT IMAGING | Facility: HOSPITAL | Age: 64
End: 2024-11-05
Payer: MEDICARE

## 2024-11-05 ENCOUNTER — HOSPITAL ENCOUNTER (EMERGENCY)
Facility: HOSPITAL | Age: 64
Discharge: HOME | End: 2024-11-05
Attending: EMERGENCY MEDICINE | Admitting: EMERGENCY MEDICINE
Payer: MEDICARE

## 2024-11-05 VITALS
BODY MASS INDEX: 13.34 KG/M2 | TEMPERATURE: 97.5 F | OXYGEN SATURATION: 94 % | HEART RATE: 79 BPM | SYSTOLIC BLOOD PRESSURE: 102 MMHG | WEIGHT: 85 LBS | RESPIRATION RATE: 20 BRPM | DIASTOLIC BLOOD PRESSURE: 59 MMHG | HEIGHT: 67 IN

## 2024-11-05 DIAGNOSIS — J18.9 COMMUNITY ACQUIRED PNEUMONIA, UNSPECIFIED LATERALITY: ICD-10-CM

## 2024-11-05 DIAGNOSIS — R06.02 SHORTNESS OF BREATH: Primary | ICD-10-CM

## 2024-11-05 DIAGNOSIS — Z87.09 HISTORY OF COPD: ICD-10-CM

## 2024-11-05 LAB
ALBUMIN SERPL-MCNC: 4.6 G/DL (ref 3.5–5.2)
ALBUMIN/GLOB SERPL: 1.7 G/DL
ALP SERPL-CCNC: 125 U/L (ref 39–117)
ALT SERPL W P-5'-P-CCNC: 8 U/L (ref 1–33)
ANION GAP SERPL CALCULATED.3IONS-SCNC: 13 MMOL/L (ref 5–15)
AST SERPL-CCNC: 22 U/L (ref 1–32)
B PARAPERT DNA SPEC QL NAA+PROBE: NOT DETECTED
B PERT DNA SPEC QL NAA+PROBE: NOT DETECTED
BASOPHILS # BLD AUTO: 0.05 10*3/MM3 (ref 0–0.2)
BASOPHILS NFR BLD AUTO: 0.9 % (ref 0–1.5)
BILIRUB SERPL-MCNC: 0.3 MG/DL (ref 0–1.2)
BUN SERPL-MCNC: 10 MG/DL (ref 8–23)
BUN/CREAT SERPL: 19.2 (ref 7–25)
C PNEUM DNA NPH QL NAA+NON-PROBE: NOT DETECTED
CALCIUM SPEC-SCNC: 9.5 MG/DL (ref 8.6–10.5)
CHLORIDE SERPL-SCNC: 103 MMOL/L (ref 98–107)
CO2 SERPL-SCNC: 26 MMOL/L (ref 22–29)
CREAT SERPL-MCNC: 0.52 MG/DL (ref 0.57–1)
D DIMER PPP FEU-MCNC: 1.16 MCGFEU/ML (ref 0–0.64)
DEPRECATED RDW RBC AUTO: 42.5 FL (ref 37–54)
EGFRCR SERPLBLD CKD-EPI 2021: 103.9 ML/MIN/1.73
EOSINOPHIL # BLD AUTO: 0.13 10*3/MM3 (ref 0–0.4)
EOSINOPHIL NFR BLD AUTO: 2.2 % (ref 0.3–6.2)
ERYTHROCYTE [DISTWIDTH] IN BLOOD BY AUTOMATED COUNT: 12.5 % (ref 12.3–15.4)
FLUAV SUBTYP SPEC NAA+PROBE: NOT DETECTED
FLUBV RNA ISLT QL NAA+PROBE: NOT DETECTED
GLOBULIN UR ELPH-MCNC: 2.7 GM/DL
GLUCOSE SERPL-MCNC: 94 MG/DL (ref 65–99)
HADV DNA SPEC NAA+PROBE: NOT DETECTED
HCOV 229E RNA SPEC QL NAA+PROBE: NOT DETECTED
HCOV HKU1 RNA SPEC QL NAA+PROBE: NOT DETECTED
HCOV NL63 RNA SPEC QL NAA+PROBE: NOT DETECTED
HCOV OC43 RNA SPEC QL NAA+PROBE: NOT DETECTED
HCT VFR BLD AUTO: 42.8 % (ref 34–46.6)
HGB BLD-MCNC: 14.2 G/DL (ref 12–15.9)
HMPV RNA NPH QL NAA+NON-PROBE: NOT DETECTED
HOLD SPECIMEN: NORMAL
HPIV1 RNA ISLT QL NAA+PROBE: NOT DETECTED
HPIV2 RNA SPEC QL NAA+PROBE: NOT DETECTED
HPIV3 RNA NPH QL NAA+PROBE: NOT DETECTED
HPIV4 P GENE NPH QL NAA+PROBE: NOT DETECTED
IMM GRANULOCYTES # BLD AUTO: 0.01 10*3/MM3 (ref 0–0.05)
IMM GRANULOCYTES NFR BLD AUTO: 0.2 % (ref 0–0.5)
LIPASE SERPL-CCNC: 71 U/L (ref 13–60)
LYMPHOCYTES # BLD AUTO: 2.12 10*3/MM3 (ref 0.7–3.1)
LYMPHOCYTES NFR BLD AUTO: 36.1 % (ref 19.6–45.3)
M PNEUMO IGG SER IA-ACNC: NOT DETECTED
MCH RBC QN AUTO: 30.8 PG (ref 26.6–33)
MCHC RBC AUTO-ENTMCNC: 33.2 G/DL (ref 31.5–35.7)
MCV RBC AUTO: 92.8 FL (ref 79–97)
MONOCYTES # BLD AUTO: 0.3 10*3/MM3 (ref 0.1–0.9)
MONOCYTES NFR BLD AUTO: 5.1 % (ref 5–12)
NEUTROPHILS NFR BLD AUTO: 3.27 10*3/MM3 (ref 1.7–7)
NEUTROPHILS NFR BLD AUTO: 55.5 % (ref 42.7–76)
NRBC BLD AUTO-RTO: 0 /100 WBC (ref 0–0.2)
NT-PROBNP SERPL-MCNC: 390.7 PG/ML (ref 0–900)
PLATELET # BLD AUTO: 273 10*3/MM3 (ref 140–450)
PMV BLD AUTO: 10.1 FL (ref 6–12)
POTASSIUM SERPL-SCNC: 3.6 MMOL/L (ref 3.5–5.2)
PROT SERPL-MCNC: 7.3 G/DL (ref 6–8.5)
RBC # BLD AUTO: 4.61 10*6/MM3 (ref 3.77–5.28)
RHINOVIRUS RNA SPEC NAA+PROBE: NOT DETECTED
RSV RNA NPH QL NAA+NON-PROBE: NOT DETECTED
SARS-COV-2 RNA NPH QL NAA+NON-PROBE: NOT DETECTED
SODIUM SERPL-SCNC: 142 MMOL/L (ref 136–145)
TROPONIN T SERPL HS-MCNC: 11 NG/L
WBC NRBC COR # BLD AUTO: 5.88 10*3/MM3 (ref 3.4–10.8)
WHOLE BLOOD HOLD COAG: NORMAL
WHOLE BLOOD HOLD SPECIMEN: NORMAL

## 2024-11-05 PROCEDURE — 83690 ASSAY OF LIPASE: CPT | Performed by: EMERGENCY MEDICINE

## 2024-11-05 PROCEDURE — 36415 COLL VENOUS BLD VENIPUNCTURE: CPT

## 2024-11-05 PROCEDURE — 84484 ASSAY OF TROPONIN QUANT: CPT | Performed by: EMERGENCY MEDICINE

## 2024-11-05 PROCEDURE — 71275 CT ANGIOGRAPHY CHEST: CPT

## 2024-11-05 PROCEDURE — 0202U NFCT DS 22 TRGT SARS-COV-2: CPT | Performed by: EMERGENCY MEDICINE

## 2024-11-05 PROCEDURE — 85025 COMPLETE CBC W/AUTO DIFF WBC: CPT | Performed by: EMERGENCY MEDICINE

## 2024-11-05 PROCEDURE — 25510000001 IOPAMIDOL PER 1 ML: Performed by: EMERGENCY MEDICINE

## 2024-11-05 PROCEDURE — 93005 ELECTROCARDIOGRAM TRACING: CPT | Performed by: EMERGENCY MEDICINE

## 2024-11-05 PROCEDURE — 83880 ASSAY OF NATRIURETIC PEPTIDE: CPT | Performed by: EMERGENCY MEDICINE

## 2024-11-05 PROCEDURE — 71045 X-RAY EXAM CHEST 1 VIEW: CPT

## 2024-11-05 PROCEDURE — 85379 FIBRIN DEGRADATION QUANT: CPT | Performed by: EMERGENCY MEDICINE

## 2024-11-05 PROCEDURE — 93005 ELECTROCARDIOGRAM TRACING: CPT

## 2024-11-05 PROCEDURE — 80053 COMPREHEN METABOLIC PANEL: CPT | Performed by: EMERGENCY MEDICINE

## 2024-11-05 PROCEDURE — 99285 EMERGENCY DEPT VISIT HI MDM: CPT

## 2024-11-05 RX ORDER — DOXYCYCLINE 100 MG/1
100 CAPSULE ORAL ONCE
Status: COMPLETED | OUTPATIENT
Start: 2024-11-05 | End: 2024-11-05

## 2024-11-05 RX ORDER — IOPAMIDOL 755 MG/ML
73 INJECTION, SOLUTION INTRAVASCULAR
Status: COMPLETED | OUTPATIENT
Start: 2024-11-05 | End: 2024-11-05

## 2024-11-05 RX ORDER — SODIUM CHLORIDE 0.9 % (FLUSH) 0.9 %
10 SYRINGE (ML) INJECTION AS NEEDED
Status: DISCONTINUED | OUTPATIENT
Start: 2024-11-05 | End: 2024-11-06 | Stop reason: HOSPADM

## 2024-11-05 RX ORDER — DOXYCYCLINE 100 MG/1
100 CAPSULE ORAL 2 TIMES DAILY
Qty: 14 CAPSULE | Refills: 0 | Status: SHIPPED | OUTPATIENT
Start: 2024-11-05

## 2024-11-05 RX ADMIN — DOXYCYCLINE 100 MG: 100 CAPSULE ORAL at 22:51

## 2024-11-05 RX ADMIN — IOPAMIDOL 73 ML: 755 INJECTION, SOLUTION INTRAVENOUS at 21:30

## 2024-11-06 LAB
QT INTERVAL: 404 MS
QTC INTERVAL: 460 MS
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
